# Patient Record
Sex: MALE | Race: WHITE | NOT HISPANIC OR LATINO | Employment: FULL TIME | URBAN - METROPOLITAN AREA
[De-identification: names, ages, dates, MRNs, and addresses within clinical notes are randomized per-mention and may not be internally consistent; named-entity substitution may affect disease eponyms.]

---

## 2017-04-27 ENCOUNTER — HISTORICAL (OUTPATIENT)
Dept: LAB | Facility: HOSPITAL | Age: 62
End: 2017-04-27

## 2017-04-27 LAB
ABS NEUT (OLG): 2.84 X10(3)/MCL (ref 2.1–9.2)
ALBUMIN SERPL-MCNC: 3.7 GM/DL (ref 3.4–5)
ALBUMIN/GLOB SERPL: 1.2 {RATIO}
ALP SERPL-CCNC: 59 UNIT/L (ref 50–136)
ALT SERPL-CCNC: 31 UNIT/L (ref 12–78)
AST SERPL-CCNC: 14 UNIT/L (ref 15–37)
BASOPHILS # BLD AUTO: 0.1 X10(3)/MCL (ref 0–0.2)
BASOPHILS NFR BLD AUTO: 1 %
BILIRUB SERPL-MCNC: 0.8 MG/DL (ref 0.2–1)
BILIRUBIN DIRECT+TOT PNL SERPL-MCNC: 0.2 MG/DL (ref 0–0.2)
BILIRUBIN DIRECT+TOT PNL SERPL-MCNC: 0.6 MG/DL (ref 0–0.8)
BUN SERPL-MCNC: 28 MG/DL (ref 7–18)
CALCIUM SERPL-MCNC: 9.4 MG/DL (ref 8.5–10.1)
CHLORIDE SERPL-SCNC: 107 MMOL/L (ref 98–107)
CHOLEST SERPL-MCNC: 158 MG/DL (ref 0–200)
CHOLEST/HDLC SERPL: 3.1 {RATIO} (ref 0–5)
CO2 SERPL-SCNC: 27 MMOL/L (ref 21–32)
CREAT SERPL-MCNC: 0.9 MG/DL (ref 0.7–1.3)
CREAT UR-MCNC: 137 MG/DL
EOSINOPHIL # BLD AUTO: 0.1 X10(3)/MCL (ref 0–0.9)
EOSINOPHIL NFR BLD AUTO: 2 %
ERYTHROCYTE [DISTWIDTH] IN BLOOD BY AUTOMATED COUNT: 13.2 % (ref 11.5–17)
EST. AVERAGE GLUCOSE BLD GHB EST-MCNC: 134 MG/DL
GLOBULIN SER-MCNC: 3 GM/DL (ref 2.4–3.5)
GLUCOSE SERPL-MCNC: 112 MG/DL (ref 74–106)
HBA1C MFR BLD: 6.3 % (ref 4.2–6.3)
HCT VFR BLD AUTO: 45.1 % (ref 42–52)
HDLC SERPL-MCNC: 51 MG/DL (ref 35–60)
HGB BLD-MCNC: 14.6 GM/DL (ref 14–18)
LDLC SERPL CALC-MCNC: 91 MG/DL (ref 0–129)
LYMPHOCYTES # BLD AUTO: 2.4 X10(3)/MCL (ref 0.6–4.6)
LYMPHOCYTES NFR BLD AUTO: 40 %
MCH RBC QN AUTO: 28.9 PG (ref 27–31)
MCHC RBC AUTO-ENTMCNC: 32.4 GM/DL (ref 33–36)
MCV RBC AUTO: 89.1 FL (ref 80–94)
MICROALBUMIN UR-MCNC: 0.5 MG/DL
MICROALBUMIN/CREAT RATIO PNL UR: 3.6 MG/GM CR (ref 0–30)
MONOCYTES # BLD AUTO: 0.6 X10(3)/MCL (ref 0.1–1.3)
MONOCYTES NFR BLD AUTO: 9 %
NEUTROPHILS # BLD AUTO: 2.84 X10(3)/MCL (ref 1.4–7.9)
NEUTROPHILS NFR BLD AUTO: 47 %
PLATELET # BLD AUTO: 280 X10(3)/MCL (ref 130–400)
PMV BLD AUTO: 9.4 FL (ref 9.4–12.4)
POTASSIUM SERPL-SCNC: 5 MMOL/L (ref 3.5–5.1)
PROT SERPL-MCNC: 6.7 GM/DL (ref 6.4–8.2)
RBC # BLD AUTO: 5.06 X10(6)/MCL (ref 4.7–6.1)
SODIUM SERPL-SCNC: 141 MMOL/L (ref 136–145)
TRIGL SERPL-MCNC: 79 MG/DL (ref 30–150)
VLDLC SERPL CALC-MCNC: 16 MG/DL
WBC # SPEC AUTO: 6.1 X10(3)/MCL (ref 4.5–11.5)

## 2017-05-02 ENCOUNTER — HISTORICAL (OUTPATIENT)
Dept: PREADMISSION TESTING | Facility: HOSPITAL | Age: 62
End: 2017-05-02

## 2017-05-12 ENCOUNTER — HISTORICAL (OUTPATIENT)
Dept: SURGERY | Facility: HOSPITAL | Age: 62
End: 2017-05-12

## 2017-10-26 ENCOUNTER — HISTORICAL (OUTPATIENT)
Dept: LAB | Facility: HOSPITAL | Age: 62
End: 2017-10-26

## 2017-10-26 LAB
ABS NEUT (OLG): 3.49 X10(3)/MCL (ref 2.1–9.2)
ALBUMIN SERPL-MCNC: 3.7 GM/DL (ref 3.4–5)
ALBUMIN/GLOB SERPL: 1.2 {RATIO}
ALP SERPL-CCNC: 75 UNIT/L (ref 50–136)
ALT SERPL-CCNC: 44 UNIT/L (ref 12–78)
AST SERPL-CCNC: 14 UNIT/L (ref 15–37)
BASOPHILS # BLD AUTO: 0.1 X10(3)/MCL (ref 0–0.2)
BASOPHILS NFR BLD AUTO: 1 %
BILIRUB SERPL-MCNC: 0.8 MG/DL (ref 0.2–1)
BILIRUBIN DIRECT+TOT PNL SERPL-MCNC: 0.2 MG/DL (ref 0–0.2)
BILIRUBIN DIRECT+TOT PNL SERPL-MCNC: 0.6 MG/DL (ref 0–0.8)
BUN SERPL-MCNC: 26 MG/DL (ref 7–18)
CALCIUM SERPL-MCNC: 9.5 MG/DL (ref 8.5–10.1)
CHLORIDE SERPL-SCNC: 105 MMOL/L (ref 98–107)
CHOLEST SERPL-MCNC: 181 MG/DL (ref 0–200)
CHOLEST/HDLC SERPL: 3.7 {RATIO} (ref 0–5)
CO2 SERPL-SCNC: 29 MMOL/L (ref 21–32)
CREAT SERPL-MCNC: 0.92 MG/DL (ref 0.7–1.3)
EOSINOPHIL # BLD AUTO: 0.3 X10(3)/MCL (ref 0–0.9)
EOSINOPHIL NFR BLD AUTO: 4 %
ERYTHROCYTE [DISTWIDTH] IN BLOOD BY AUTOMATED COUNT: 12.6 % (ref 11.5–17)
EST. AVERAGE GLUCOSE BLD GHB EST-MCNC: 157 MG/DL
GLOBULIN SER-MCNC: 3.2 GM/DL (ref 2.4–3.5)
GLUCOSE SERPL-MCNC: 128 MG/DL (ref 74–106)
HBA1C MFR BLD: 7.1 % (ref 4.2–6.3)
HCT VFR BLD AUTO: 43.9 % (ref 42–52)
HDLC SERPL-MCNC: 49 MG/DL (ref 35–60)
HGB BLD-MCNC: 14.4 GM/DL (ref 14–18)
LDLC SERPL CALC-MCNC: 116 MG/DL (ref 0–129)
LYMPHOCYTES # BLD AUTO: 2.9 X10(3)/MCL (ref 0.6–4.6)
LYMPHOCYTES NFR BLD AUTO: 38 %
MCH RBC QN AUTO: 29.1 PG (ref 27–31)
MCHC RBC AUTO-ENTMCNC: 32.8 GM/DL (ref 33–36)
MCV RBC AUTO: 88.9 FL (ref 80–94)
MONOCYTES # BLD AUTO: 0.8 X10(3)/MCL (ref 0.1–1.3)
MONOCYTES NFR BLD AUTO: 11 %
NEUTROPHILS # BLD AUTO: 3.49 X10(3)/MCL (ref 1.4–7.9)
NEUTROPHILS NFR BLD AUTO: 46 %
PLATELET # BLD AUTO: 315 X10(3)/MCL (ref 130–400)
PMV BLD AUTO: 9 FL (ref 9.4–12.4)
POTASSIUM SERPL-SCNC: 4.7 MMOL/L (ref 3.5–5.1)
PROT SERPL-MCNC: 6.9 GM/DL (ref 6.4–8.2)
RBC # BLD AUTO: 4.94 X10(6)/MCL (ref 4.7–6.1)
SODIUM SERPL-SCNC: 139 MMOL/L (ref 136–145)
TRIGL SERPL-MCNC: 80 MG/DL (ref 30–150)
VLDLC SERPL CALC-MCNC: 16 MG/DL
WBC # SPEC AUTO: 7.6 X10(3)/MCL (ref 4.5–11.5)

## 2018-09-08 ENCOUNTER — HISTORICAL (OUTPATIENT)
Dept: ADMINISTRATIVE | Facility: HOSPITAL | Age: 63
End: 2018-09-08

## 2018-12-17 ENCOUNTER — HISTORICAL (OUTPATIENT)
Dept: LAB | Facility: HOSPITAL | Age: 63
End: 2018-12-17

## 2018-12-17 LAB
ABS NEUT (OLG): 4.29 X10(3)/MCL (ref 2.1–9.2)
ALBUMIN SERPL-MCNC: 3.8 GM/DL (ref 3.4–5)
ALBUMIN/GLOB SERPL: 1.2 {RATIO}
ALP SERPL-CCNC: 68 UNIT/L (ref 50–136)
ALT SERPL-CCNC: 44 UNIT/L (ref 12–78)
APPEARANCE, UA: CLEAR
AST SERPL-CCNC: 17 UNIT/L (ref 15–37)
BACTERIA SPEC CULT: NORMAL /HPF
BASOPHILS # BLD AUTO: 0 X10(3)/MCL (ref 0–0.2)
BASOPHILS NFR BLD AUTO: 1 %
BILIRUB SERPL-MCNC: 0.9 MG/DL (ref 0.2–1)
BILIRUB UR QL STRIP: NEGATIVE
BILIRUBIN DIRECT+TOT PNL SERPL-MCNC: 0.2 MG/DL (ref 0–0.2)
BILIRUBIN DIRECT+TOT PNL SERPL-MCNC: 0.7 MG/DL (ref 0–0.8)
BUN SERPL-MCNC: 23 MG/DL (ref 7–18)
CALCIUM SERPL-MCNC: 9.3 MG/DL (ref 8.5–10.1)
CHLORIDE SERPL-SCNC: 107 MMOL/L (ref 98–107)
CHOLEST SERPL-MCNC: 186 MG/DL (ref 0–200)
CHOLEST/HDLC SERPL: 3.5 {RATIO} (ref 0–5)
CO2 SERPL-SCNC: 31 MMOL/L (ref 21–32)
COLOR UR: YELLOW
CREAT SERPL-MCNC: 1 MG/DL (ref 0.7–1.3)
CREAT UR-MCNC: 219 MG/DL
EOSINOPHIL # BLD AUTO: 0.3 X10(3)/MCL (ref 0–0.9)
EOSINOPHIL NFR BLD AUTO: 4 %
ERYTHROCYTE [DISTWIDTH] IN BLOOD BY AUTOMATED COUNT: 12.7 % (ref 11.5–17)
EST. AVERAGE GLUCOSE BLD GHB EST-MCNC: 171 MG/DL
GLOBULIN SER-MCNC: 3.2 GM/DL (ref 2.4–3.5)
GLUCOSE (UA): NEGATIVE
GLUCOSE SERPL-MCNC: 139 MG/DL (ref 74–106)
HBA1C MFR BLD: 7.6 % (ref 4.2–6.3)
HCT VFR BLD AUTO: 48.8 % (ref 42–52)
HDLC SERPL-MCNC: 53 MG/DL (ref 35–60)
HGB BLD-MCNC: 15.3 GM/DL (ref 14–18)
HGB UR QL STRIP: NEGATIVE
KETONES UR QL STRIP: NEGATIVE
LDLC SERPL CALC-MCNC: 108 MG/DL (ref 0–129)
LEUKOCYTE ESTERASE UR QL STRIP: NEGATIVE
LYMPHOCYTES # BLD AUTO: 2.7 X10(3)/MCL (ref 0.6–4.6)
LYMPHOCYTES NFR BLD AUTO: 34 %
MCH RBC QN AUTO: 29 PG (ref 27–31)
MCHC RBC AUTO-ENTMCNC: 31.4 GM/DL (ref 33–36)
MCV RBC AUTO: 92.4 FL (ref 80–94)
MICROALBUMIN UR-MCNC: 0.8 MG/DL
MICROALBUMIN/CREAT RATIO PNL UR: 3.7 MG/GM CR (ref 0–30)
MONOCYTES # BLD AUTO: 0.8 X10(3)/MCL (ref 0.1–1.3)
MONOCYTES NFR BLD AUTO: 9 %
NEUTROPHILS # BLD AUTO: 4.29 X10(3)/MCL (ref 2.1–9.2)
NEUTROPHILS NFR BLD AUTO: 53 %
NITRITE UR QL STRIP: NEGATIVE
PH UR STRIP: 5 [PH] (ref 5–9)
PLATELET # BLD AUTO: 287 X10(3)/MCL (ref 130–400)
PMV BLD AUTO: 9.5 FL (ref 9.4–12.4)
POTASSIUM SERPL-SCNC: 4.7 MMOL/L (ref 3.5–5.1)
PROT SERPL-MCNC: 7 GM/DL (ref 6.4–8.2)
PROT UR QL STRIP: NEGATIVE
PSA SERPL-MCNC: 5.67 NG/ML (ref 0–4)
RBC # BLD AUTO: 5.28 X10(6)/MCL (ref 4.7–6.1)
RBC #/AREA URNS HPF: NORMAL /[HPF]
SODIUM SERPL-SCNC: 146 MMOL/L (ref 136–145)
SP GR UR STRIP: 1.02 (ref 1–1.03)
SQUAMOUS EPITHELIAL, UA: NORMAL
TRIGL SERPL-MCNC: 125 MG/DL (ref 30–150)
TSH SERPL-ACNC: 0.95 MIU/L (ref 0.36–3.74)
UROBILINOGEN UR STRIP-ACNC: 0.2
VLDLC SERPL CALC-MCNC: 25 MG/DL
WBC # SPEC AUTO: 8.2 X10(3)/MCL (ref 4.5–11.5)
WBC #/AREA URNS HPF: NORMAL /HPF

## 2019-01-06 ENCOUNTER — HISTORICAL (OUTPATIENT)
Dept: ADMINISTRATIVE | Facility: HOSPITAL | Age: 64
End: 2019-01-06

## 2019-04-16 ENCOUNTER — HISTORICAL (OUTPATIENT)
Dept: LAB | Facility: HOSPITAL | Age: 64
End: 2019-04-16

## 2019-04-16 LAB
ABS NEUT (OLG): 4.25 X10(3)/MCL (ref 2.1–9.2)
ALBUMIN SERPL-MCNC: 3.8 GM/DL (ref 3.4–5)
ALBUMIN/GLOB SERPL: 1.2 {RATIO}
ALP SERPL-CCNC: 70 UNIT/L (ref 50–136)
ALT SERPL-CCNC: 32 UNIT/L (ref 12–78)
APPEARANCE, UA: CLEAR
AST SERPL-CCNC: 18 UNIT/L (ref 15–37)
BACTERIA SPEC CULT: ABNORMAL /HPF
BASOPHILS # BLD AUTO: 0 X10(3)/MCL (ref 0–0.2)
BASOPHILS NFR BLD AUTO: 0 %
BILIRUB SERPL-MCNC: 0.8 MG/DL (ref 0.2–1)
BILIRUB UR QL STRIP: ABNORMAL
BILIRUBIN DIRECT+TOT PNL SERPL-MCNC: 0.2 MG/DL (ref 0–0.2)
BILIRUBIN DIRECT+TOT PNL SERPL-MCNC: 0.6 MG/DL (ref 0–0.8)
BUN SERPL-MCNC: 24 MG/DL (ref 7–18)
CALCIUM SERPL-MCNC: 9.5 MG/DL (ref 8.5–10.1)
CHLORIDE SERPL-SCNC: 105 MMOL/L (ref 98–107)
CHOLEST SERPL-MCNC: 186 MG/DL (ref 0–200)
CHOLEST/HDLC SERPL: 3.6 {RATIO} (ref 0–5)
CO2 SERPL-SCNC: 29 MMOL/L (ref 21–32)
COLOR UR: ABNORMAL
CREAT SERPL-MCNC: 1.03 MG/DL (ref 0.7–1.3)
CREAT UR-MCNC: 388 MG/DL
EOSINOPHIL # BLD AUTO: 0.2 X10(3)/MCL (ref 0–0.9)
EOSINOPHIL NFR BLD AUTO: 2 %
ERYTHROCYTE [DISTWIDTH] IN BLOOD BY AUTOMATED COUNT: 12.9 % (ref 11.5–17)
EST. AVERAGE GLUCOSE BLD GHB EST-MCNC: 154 MG/DL
GLOBULIN SER-MCNC: 3.2 GM/DL (ref 2.4–3.5)
GLUCOSE (UA): NEGATIVE
GLUCOSE SERPL-MCNC: 129 MG/DL (ref 74–106)
HBA1C MFR BLD: 7 % (ref 4.2–6.3)
HCT VFR BLD AUTO: 45.5 % (ref 42–52)
HDLC SERPL-MCNC: 51 MG/DL (ref 35–60)
HGB BLD-MCNC: 14.6 GM/DL (ref 14–18)
HGB UR QL STRIP: NEGATIVE
KETONES UR QL STRIP: ABNORMAL
LDLC SERPL CALC-MCNC: 109 MG/DL (ref 0–129)
LEUKOCYTE ESTERASE UR QL STRIP: NEGATIVE
LYMPHOCYTES # BLD AUTO: 2.2 X10(3)/MCL (ref 0.6–4.6)
LYMPHOCYTES NFR BLD AUTO: 31 %
MCH RBC QN AUTO: 28.9 PG (ref 27–31)
MCHC RBC AUTO-ENTMCNC: 32.1 GM/DL (ref 33–36)
MCV RBC AUTO: 89.9 FL (ref 80–94)
MICROALBUMIN UR-MCNC: 1.8 MG/DL
MICROALBUMIN/CREAT RATIO PNL UR: 4.6 MG/GM CR (ref 0–30)
MONOCYTES # BLD AUTO: 0.6 X10(3)/MCL (ref 0.1–1.3)
MONOCYTES NFR BLD AUTO: 8 %
NEUTROPHILS # BLD AUTO: 4.25 X10(3)/MCL (ref 2.1–9.2)
NEUTROPHILS NFR BLD AUTO: 59 %
NITRITE UR QL STRIP: NEGATIVE
PH UR STRIP: 5 [PH] (ref 5–9)
PLATELET # BLD AUTO: 291 X10(3)/MCL (ref 130–400)
PMV BLD AUTO: 9.6 FL (ref 9.4–12.4)
POTASSIUM SERPL-SCNC: 4.5 MMOL/L (ref 3.5–5.1)
PROT SERPL-MCNC: 7 GM/DL (ref 6.4–8.2)
PROT UR QL STRIP: NEGATIVE
PSA SERPL-MCNC: 5.34 NG/ML (ref 0–4)
RBC # BLD AUTO: 5.06 X10(6)/MCL (ref 4.7–6.1)
RBC #/AREA URNS HPF: ABNORMAL /[HPF]
SODIUM SERPL-SCNC: 140 MMOL/L (ref 136–145)
SP GR UR STRIP: 1.02 (ref 1–1.03)
SQUAMOUS EPITHELIAL, UA: ABNORMAL
TRIGL SERPL-MCNC: 129 MG/DL (ref 30–150)
TSH SERPL-ACNC: 0.94 MIU/L (ref 0.36–3.74)
UROBILINOGEN UR STRIP-ACNC: 1
VLDLC SERPL CALC-MCNC: 26 MG/DL
WBC # SPEC AUTO: 7.2 X10(3)/MCL (ref 4.5–11.5)
WBC #/AREA URNS HPF: ABNORMAL /HPF

## 2019-09-03 ENCOUNTER — HISTORICAL (OUTPATIENT)
Dept: LAB | Facility: HOSPITAL | Age: 64
End: 2019-09-03

## 2019-09-03 LAB
ALBUMIN SERPL-MCNC: 3.8 GM/DL (ref 3.4–5)
ALBUMIN/GLOB SERPL: 1.3 {RATIO}
ALP SERPL-CCNC: 60 UNIT/L (ref 45–117)
ALT SERPL-CCNC: 26 UNIT/L (ref 16–61)
AST SERPL-CCNC: 16 UNIT/L (ref 15–37)
BILIRUB SERPL-MCNC: 0.9 MG/DL (ref 0.2–1)
BILIRUBIN DIRECT+TOT PNL SERPL-MCNC: 0.18 MG/DL (ref 0–0.2)
BILIRUBIN DIRECT+TOT PNL SERPL-MCNC: 0.72 MG/DL (ref 0–1)
BUN SERPL-MCNC: 27 MG/DL (ref 7–18)
CALCIUM SERPL-MCNC: 9.5 MG/DL (ref 8.5–10.1)
CHLORIDE SERPL-SCNC: 109 MMOL/L (ref 98–107)
CO2 SERPL-SCNC: 30 MMOL/L (ref 21–32)
CREAT SERPL-MCNC: 1.02 MG/DL (ref 0.7–1.3)
EST. AVERAGE GLUCOSE BLD GHB EST-MCNC: 154 MG/DL
GLOBULIN SER-MCNC: 3 GM/DL (ref 2–4)
GLUCOSE SERPL-MCNC: 141 MG/DL (ref 74–106)
HBA1C MFR BLD: 7 % (ref 4.2–6.3)
POTASSIUM SERPL-SCNC: 4.7 MMOL/L (ref 3.5–5.1)
PROT SERPL-MCNC: 6.9 GM/DL (ref 6.4–8.2)
PSA SERPL-MCNC: 6.22 NG/ML (ref 0–4)
SODIUM SERPL-SCNC: 143 MMOL/L (ref 136–145)

## 2019-12-03 ENCOUNTER — HISTORICAL (OUTPATIENT)
Dept: LAB | Facility: HOSPITAL | Age: 64
End: 2019-12-03

## 2019-12-03 ENCOUNTER — HISTORICAL (OUTPATIENT)
Dept: RADIOLOGY | Facility: HOSPITAL | Age: 64
End: 2019-12-03

## 2019-12-03 LAB
ALBUMIN SERPL-MCNC: 3.7 GM/DL (ref 3.4–5)
ALBUMIN/GLOB SERPL: 1.2 {RATIO}
ALP SERPL-CCNC: 70 UNIT/L (ref 50–136)
ALT SERPL-CCNC: 37 UNIT/L (ref 12–78)
AST SERPL-CCNC: 16 UNIT/L (ref 15–37)
BILIRUB SERPL-MCNC: 0.8 MG/DL (ref 0.2–1)
BILIRUBIN DIRECT+TOT PNL SERPL-MCNC: 0.1 MG/DL (ref 0–0.2)
BILIRUBIN DIRECT+TOT PNL SERPL-MCNC: 0.7 MG/DL (ref 0–0.8)
BUN SERPL-MCNC: 21 MG/DL (ref 7–18)
CALCIUM SERPL-MCNC: 9.3 MG/DL (ref 8.5–10.1)
CHLORIDE SERPL-SCNC: 106 MMOL/L (ref 98–107)
CO2 SERPL-SCNC: 25 MMOL/L (ref 21–32)
CREAT SERPL-MCNC: 0.94 MG/DL (ref 0.7–1.3)
EST. AVERAGE GLUCOSE BLD GHB EST-MCNC: 157 MG/DL
GLOBULIN SER-MCNC: 3.2 GM/DL (ref 2.4–3.5)
GLUCOSE SERPL-MCNC: 154 MG/DL (ref 74–106)
HBA1C MFR BLD: 7.1 % (ref 4.2–6.3)
POTASSIUM SERPL-SCNC: 4.4 MMOL/L (ref 3.5–5.1)
PROT SERPL-MCNC: 6.9 GM/DL (ref 6.4–8.2)
SODIUM SERPL-SCNC: 139 MMOL/L (ref 136–145)

## 2020-02-11 ENCOUNTER — OFFICE VISIT (OUTPATIENT)
Dept: UROLOGY | Facility: CLINIC | Age: 65
End: 2020-02-11
Payer: COMMERCIAL

## 2020-02-11 ENCOUNTER — TELEPHONE (OUTPATIENT)
Dept: UROLOGY | Facility: CLINIC | Age: 65
End: 2020-02-11

## 2020-02-11 VITALS
WEIGHT: 220.44 LBS | DIASTOLIC BLOOD PRESSURE: 85 MMHG | HEIGHT: 70 IN | BODY MASS INDEX: 31.56 KG/M2 | SYSTOLIC BLOOD PRESSURE: 136 MMHG | RESPIRATION RATE: 15 BRPM | HEART RATE: 64 BPM

## 2020-02-11 DIAGNOSIS — C61 PROSTATE CANCER: Primary | ICD-10-CM

## 2020-02-11 PROCEDURE — 99999 PR PBB SHADOW E&M-NEW PATIENT-LVL III: ICD-10-PCS | Mod: PBBFAC,,, | Performed by: UROLOGY

## 2020-02-11 PROCEDURE — 3008F PR BODY MASS INDEX (BMI) DOCUMENTED: ICD-10-PCS | Mod: CPTII,S$GLB,, | Performed by: UROLOGY

## 2020-02-11 PROCEDURE — 99204 OFFICE O/P NEW MOD 45 MIN: CPT | Mod: S$GLB,,, | Performed by: UROLOGY

## 2020-02-11 PROCEDURE — 99999 PR PBB SHADOW E&M-NEW PATIENT-LVL III: CPT | Mod: PBBFAC,,, | Performed by: UROLOGY

## 2020-02-11 PROCEDURE — 3008F BODY MASS INDEX DOCD: CPT | Mod: CPTII,S$GLB,, | Performed by: UROLOGY

## 2020-02-11 PROCEDURE — 99204 PR OFFICE/OUTPT VISIT, NEW, LEVL IV, 45-59 MIN: ICD-10-PCS | Mod: S$GLB,,, | Performed by: UROLOGY

## 2020-02-11 RX ORDER — ATORVASTATIN CALCIUM 20 MG/1
TABLET, FILM COATED ORAL
COMMUNITY
Start: 2018-07-10 | End: 2023-04-14 | Stop reason: SDUPTHER

## 2020-02-11 RX ORDER — ASPIRIN 81 MG/1
81 TABLET ORAL DAILY
COMMUNITY

## 2020-02-11 RX ORDER — CARVEDILOL 6.25 MG/1
TABLET ORAL
COMMUNITY
Start: 2018-07-23 | End: 2023-04-24 | Stop reason: SDUPTHER

## 2020-02-11 RX ORDER — LISINOPRIL 20 MG/1
TABLET ORAL
COMMUNITY
Start: 2018-07-10 | End: 2023-04-24 | Stop reason: SDUPTHER

## 2020-02-11 RX ORDER — MELOXICAM 7.5 MG/1
7.5 TABLET ORAL DAILY
COMMUNITY
Start: 2020-01-13 | End: 2020-07-16

## 2020-02-11 RX ORDER — METFORMIN HYDROCHLORIDE 500 MG/1
500 TABLET ORAL 2 TIMES DAILY
COMMUNITY
Start: 2018-07-23 | End: 2023-04-24 | Stop reason: SDUPTHER

## 2020-02-11 NOTE — LETTER
February 11, 2020        Solo Hernandez III, MD  [address]  [facility] [address]             [facility]  [address] [address]  Phone: [phone]   Patient: Zohaib Moon   MR Number: [id number]   YOB: 1955   Date of Visit: 2/11/2020       Dear Dr. Hernandez:    Thank you for referring Zohaib Moon to me for evaluation. Attached you will find relevant portions of my assessment and plan of care.    If you have questions, please do not hesitate to call me. I look forward to following Zohaib Moon along with you.    Sincerely,      Ti Maier MD            CC  No Recipients    Enclosure

## 2020-02-11 NOTE — PROGRESS NOTES
Clinic Note  2/11/2020      Subjective:         Chief Complaint:   HPI  Zohaib Moon is a 65 y.o. male recently prostate cancer on January 28th. Was told he had intermediate risk tumor.  Consult from Dr. Solo Hernandez.  Type 2 diabetes. Negative family history. Semi-retired from restaurant business, . 4 grandkids.  Continent and potent    T1C  PSA 6.2  MRI 12/9/2019- 60 ccs, PI-RADS 4 1.1 cm anterior lesion. Negative extra prostatic extension, seminal vesicle.  Biopsy-1/21/2019- Jose 6, left anterior target 4/7 28%, right apex 1/1 6%, left apex 1/1 6%, left middle 2/2 50%, left lateral apex 1/1 7%, left lateral mid 1/1 67%, left lateral base 1/1 8%. Overall 11/20 positive cores.  MARILYN score- 3 low risk  NCCN-low risk      No results found for: PSA, PSADIAG, PSATOTAL, PSAFREE, PSAFREEPCT   No past medical history on file.  No family history on file.  Social History     Socioeconomic History    Marital status:      Spouse name: Not on file    Number of children: Not on file    Years of education: Not on file    Highest education level: Not on file   Occupational History    Not on file   Social Needs    Financial resource strain: Not on file    Food insecurity:     Worry: Not on file     Inability: Not on file    Transportation needs:     Medical: Not on file     Non-medical: Not on file   Tobacco Use    Smoking status: Not on file   Substance and Sexual Activity    Alcohol use: Not on file    Drug use: Not on file    Sexual activity: Not on file   Lifestyle    Physical activity:     Days per week: Not on file     Minutes per session: Not on file    Stress: Not on file   Relationships    Social connections:     Talks on phone: Not on file     Gets together: Not on file     Attends Jainism service: Not on file     Active member of club or organization: Not on file     Attends meetings of clubs or organizations: Not on file     Relationship status: Not on file   Other Topics  Concern    Not on file   Social History Narrative    Not on file     No past surgical history on file.  There is no problem list on file for this patient.    Review of Systems   Constitutional: Negative for appetite change, chills, fatigue, fever and unexpected weight change.   HENT: Negative for nosebleeds.    Respiratory: Negative for shortness of breath and wheezing.    Cardiovascular: Negative for chest pain, palpitations and leg swelling.   Gastrointestinal: Negative for abdominal distention, abdominal pain, constipation, diarrhea, nausea and vomiting.   Genitourinary: Positive for nocturia. Negative for dysuria and hematuria.        2x/noc   Musculoskeletal: Negative for arthralgias and back pain.   Skin: Negative for pallor.   Neurological: Negative for dizziness, seizures and syncope.   Hematological: Negative for adenopathy.   Psychiatric/Behavioral: Negative for dysphoric mood.         Objective:      There were no vitals taken for this visit.  There is no height or weight on file to calculate BMI.  Physical Exam   Constitutional: He is oriented to person, place, and time. He appears well-developed and well-nourished. No distress.   HENT:   Head: Atraumatic.   Neck: No tracheal deviation present.   Cardiovascular: Normal rate.    Pulmonary/Chest: Effort normal. No respiratory distress. He has no wheezes.   Abdominal: Soft. Bowel sounds are normal. He exhibits no distension and no mass. There is no tenderness. There is no rebound and no guarding.   Neurological: He is alert and oriented to person, place, and time.   Skin: Skin is warm and dry. He is not diaphoretic.     Psychiatric: He has a normal mood and affect. His behavior is normal. Judgment and thought content normal.         Assessment and Plan:           Problem List Items Addressed This Visit     None          Follow up:   Today's visit was spent almost entirely on counseling. We reviewed his diagnosis, stage, grade, risk group, and prognosis.  We discussed [place] (NCCN) and MARILYN risk stratification  We discussed the concept of low risk, intermediate risk, and high risk disease. We also reviewed the NCCN treatment nomogram.We discussed the different treatment options including active surveillance (as well as the surveillance protocol), prostate brachytherapy, IMRT, IGRT, cryotherapy, HIFU and both open and robotic prostatectomy.We also discussed the advantages, disadvantages, risks and benefits, as well as complications of each option. Regarding radiation therapy we discussed treatment planning, the different techniques, short and long term complications. These included radiation cystitis, radiation proctitis, and impotence. We discussed success, failure, and salvage therapeutic options.Also discussed the use of SpaceOAR for brachytherapy and EBRT and fiducials for EBRT.   We discussed surgical therapy in depth including preoperative preparation, surgical technique (including bladder neck and nerve-sparing techniques), postoperative recuperation and recovery, and short and long term complications including UTI, bleeding, blood clots,catheter dislodgement, etc. We discussed the risks of reoperation, incontinence, impotence, and recurrence. We discussed preop and postop Kegels, post op penile rehab, and treatment options for incontinence and impotence. We discussed rates of cancer free survival and recurrence, as well as salvage therapeutic options. We discussed the possible  indications for adjuvant radiation therapy.   I answered questions and addressed concerns. Also discussed the Prolaris test to get genetic information about this tumors potential future behavior.   My nurse will instruct the patient on Kegel exercises today and give them the Kegel exercise instruction sheet.   The patient will meet with our  Enma today to find a date for surgery and begin preparation for surgery. Will also receive our handout on NPO guidelines and preop  hydration. Patient will also receive information and education on preoperative skin preparation and postop wound care.   I spent over 45 minutes with the patient. Over 50% of the visit was spent in counseling.   .Letter to Dr. Solo Hernandez.    Ti Maier

## 2020-02-17 ENCOUNTER — PATIENT MESSAGE (OUTPATIENT)
Dept: SURGERY | Facility: HOSPITAL | Age: 65
End: 2020-02-17

## 2020-02-23 ENCOUNTER — PATIENT MESSAGE (OUTPATIENT)
Dept: SURGERY | Facility: HOSPITAL | Age: 65
End: 2020-02-23

## 2020-03-06 NOTE — ANESTHESIA PAT ROS NOTE
03/06/2020  Zohaib Moon is a 65 y.o., male.      Pre-op Assessment         Review of Systems         Anesthesia Assessment: Preoperative EQUATION    Planned Procedure: Procedure(s) (LRB):  ROBOTIC PROSTATECTOMY (N/A)  Requested Anesthesia Type:General  Surgeon: Ti Maier MD  Service: Urology  Known or anticipated Date of Surgery:3/23/2020    Surgeon notes: reviewed    Previous anesthesia records:Not available    Last PCP note: 3-6 months ago , outside [facility] Dr Yaritza Levy 12/3/19  Subspecialty notes: Cardiology: General, Urology    Other important co-morbidities: per Epic DM2, HLD, HTN, Obesity and prostate ca, DDD, hearing loss      Tests already available:  No recent tests.             Instructions given. (See in Nurse's note)    Optimization:  Anesthesia Preop Clinic Assessment  Indicated.    Medical Opinion Indicated.           Plan:    Testing:  A1C, CMP, EKG, Hematology Profile and T&S   Pre-anesthesia  visit       Visit focus: concerns in complex and/or prolonged anesthesia, position other than supine     Consultation:IM Perioperative Hospitalist     Patient  has previously scheduled Medical Appointment: 3/18/2020 Urology pre-op    Navigation: Tests Scheduled.              Consults scheduled.             Results will be tracked by Preop Clinic.      3/10/2020  Received OS PCP records from Dr. Levy. (scanned to media)     3/16/2020  Received OS card records (scanned to media)

## 2020-03-09 ENCOUNTER — TELEPHONE (OUTPATIENT)
Dept: PREADMISSION TESTING | Facility: HOSPITAL | Age: 65
End: 2020-03-09

## 2020-03-09 NOTE — TELEPHONE ENCOUNTER
----- Message from Eufemia Alonso RN sent at 3/6/2020 12:34 PM CST -----  Surgery date: 3/23/2020  PreOp appt date: 3/18/2020 (please note patient coming in from [place])    Please call patient to schedule the following appointments:    Labs  EKG  POC  Ming/NP      Thanks!    Eufemia Alonso

## 2020-03-09 NOTE — TELEPHONE ENCOUNTER
Hello This patient is coming in from out of town. Needs an OPOC there is none before the patient Surgery.

## 2020-03-10 ENCOUNTER — PATIENT MESSAGE (OUTPATIENT)
Dept: SURGERY | Facility: HOSPITAL | Age: 65
End: 2020-03-10

## 2020-03-16 ENCOUNTER — PATIENT MESSAGE (OUTPATIENT)
Dept: SURGERY | Facility: HOSPITAL | Age: 65
End: 2020-03-16

## 2020-03-17 NOTE — PRE-PROCEDURE INSTRUCTIONS
Preop medication instructions given and reviewed; instructed to hold vitamins, herbal supplements and NSAIDS one week prior to surgery;  Patient verbalized understanding.

## 2020-03-18 ENCOUNTER — INITIAL CONSULT (OUTPATIENT)
Dept: INTERNAL MEDICINE | Facility: CLINIC | Age: 65
End: 2020-03-18
Payer: COMMERCIAL

## 2020-03-18 ENCOUNTER — OFFICE VISIT (OUTPATIENT)
Dept: UROLOGY | Facility: CLINIC | Age: 65
End: 2020-03-18
Payer: COMMERCIAL

## 2020-03-18 ENCOUNTER — ANESTHESIA EVENT (OUTPATIENT)
Dept: SURGERY | Facility: HOSPITAL | Age: 65
End: 2020-03-18
Payer: COMMERCIAL

## 2020-03-18 ENCOUNTER — HOSPITAL ENCOUNTER (OUTPATIENT)
Dept: CARDIOLOGY | Facility: CLINIC | Age: 65
Discharge: HOME | End: 2020-03-18
Attending: ANESTHESIOLOGY
Payer: COMMERCIAL

## 2020-03-18 VITALS
OXYGEN SATURATION: 100 % | HEART RATE: 58 BPM | HEIGHT: 70 IN | WEIGHT: 218.5 LBS | BODY MASS INDEX: 31.28 KG/M2 | TEMPERATURE: 99 F | SYSTOLIC BLOOD PRESSURE: 148 MMHG | DIASTOLIC BLOOD PRESSURE: 74 MMHG

## 2020-03-18 DIAGNOSIS — Z01.818 PREOP EXAMINATION: Primary | ICD-10-CM

## 2020-03-18 DIAGNOSIS — E78.00 HYPERCHOLESTEROLEMIA: ICD-10-CM

## 2020-03-18 DIAGNOSIS — R06.83 SNORING: ICD-10-CM

## 2020-03-18 DIAGNOSIS — E66.9 CLASS 1 OBESITY WITH BODY MASS INDEX (BMI) OF 31.0 TO 31.9 IN ADULT, UNSPECIFIED OBESITY TYPE, UNSPECIFIED WHETHER SERIOUS COMORBIDITY PRESENT: ICD-10-CM

## 2020-03-18 DIAGNOSIS — Z98.890 HISTORY OF CORONARY ANGIOGRAM: ICD-10-CM

## 2020-03-18 DIAGNOSIS — C61 PROSTATE CANCER: ICD-10-CM

## 2020-03-18 DIAGNOSIS — H91.91 HEARING LOSS OF RIGHT EAR, UNSPECIFIED HEARING LOSS TYPE: ICD-10-CM

## 2020-03-18 DIAGNOSIS — E11.9 TYPE 2 DIABETES MELLITUS WITHOUT COMPLICATION, WITHOUT LONG-TERM CURRENT USE OF INSULIN: ICD-10-CM

## 2020-03-18 DIAGNOSIS — R17 TOTAL BILIRUBIN, ELEVATED: ICD-10-CM

## 2020-03-18 DIAGNOSIS — R00.1 SINUS BRADYCARDIA: ICD-10-CM

## 2020-03-18 DIAGNOSIS — C61 PROSTATE CANCER: Primary | ICD-10-CM

## 2020-03-18 DIAGNOSIS — Z01.818 PREOP TESTING: ICD-10-CM

## 2020-03-18 DIAGNOSIS — I10 ESSENTIAL HYPERTENSION: ICD-10-CM

## 2020-03-18 DIAGNOSIS — Z82.49 FAMILY HISTORY OF HEART DISEASE: ICD-10-CM

## 2020-03-18 PROBLEM — H91.90 HEARING LOSS: Status: ACTIVE | Noted: 2020-03-18

## 2020-03-18 PROBLEM — E66.811 CLASS 1 OBESITY WITH BODY MASS INDEX (BMI) OF 31.0 TO 31.9 IN ADULT: Status: ACTIVE | Noted: 2020-03-18

## 2020-03-18 PROCEDURE — 99999 PR PBB SHADOW E&M-EST. PATIENT-LVL I: CPT | Mod: PBBFAC,,,

## 2020-03-18 PROCEDURE — 93010 EKG 12-LEAD: ICD-10-PCS | Mod: S$GLB,,, | Performed by: INTERNAL MEDICINE

## 2020-03-18 PROCEDURE — 99244 OFF/OP CNSLTJ NEW/EST MOD 40: CPT | Mod: S$GLB,,, | Performed by: HOSPITALIST

## 2020-03-18 PROCEDURE — 93010 ELECTROCARDIOGRAM REPORT: CPT | Mod: S$GLB,,, | Performed by: INTERNAL MEDICINE

## 2020-03-18 PROCEDURE — 87086 URINE CULTURE/COLONY COUNT: CPT

## 2020-03-18 PROCEDURE — 99244 PR OFFICE CONSULTATION,LEVEL IV: ICD-10-PCS | Mod: S$GLB,,, | Performed by: HOSPITALIST

## 2020-03-18 PROCEDURE — 99999 PR PBB SHADOW E&M-EST. PATIENT-LVL III: CPT | Mod: PBBFAC,,, | Performed by: HOSPITALIST

## 2020-03-18 PROCEDURE — 99999 PR PBB SHADOW E&M-EST. PATIENT-LVL I: ICD-10-PCS | Mod: PBBFAC,,,

## 2020-03-18 PROCEDURE — 99499 NO LOS: ICD-10-PCS | Mod: S$GLB,,, | Performed by: UROLOGY

## 2020-03-18 PROCEDURE — 93005 ELECTROCARDIOGRAM TRACING: CPT | Mod: S$GLB,,, | Performed by: ANESTHESIOLOGY

## 2020-03-18 PROCEDURE — 99499 UNLISTED E&M SERVICE: CPT | Mod: S$GLB,,, | Performed by: UROLOGY

## 2020-03-18 PROCEDURE — 99999 PR PBB SHADOW E&M-EST. PATIENT-LVL III: ICD-10-PCS | Mod: PBBFAC,,, | Performed by: HOSPITALIST

## 2020-03-18 PROCEDURE — 93005 EKG 12-LEAD: ICD-10-PCS | Mod: S$GLB,,, | Performed by: ANESTHESIOLOGY

## 2020-03-18 RX ORDER — MULTIVITAMIN
1 TABLET ORAL NIGHTLY
COMMUNITY

## 2020-03-18 RX ORDER — PREGABALIN 25 MG/1
150 CAPSULE ORAL
Status: CANCELLED | OUTPATIENT
Start: 2020-03-18 | End: 2020-03-19

## 2020-03-18 RX ORDER — HEPARIN SODIUM 5000 [USP'U]/ML
5000 INJECTION, SOLUTION INTRAVENOUS; SUBCUTANEOUS
Status: CANCELLED | OUTPATIENT
Start: 2020-03-18 | End: 2020-03-19

## 2020-03-18 RX ORDER — ACETAMINOPHEN 500 MG
1000 TABLET ORAL
Status: CANCELLED | OUTPATIENT
Start: 2020-03-18

## 2020-03-18 RX ORDER — SODIUM CHLORIDE 9 MG/ML
INJECTION, SOLUTION INTRAVENOUS CONTINUOUS
Status: CANCELLED | OUTPATIENT
Start: 2020-03-18

## 2020-03-18 RX ORDER — KETOROLAC TROMETHAMINE 30 MG/ML
15 INJECTION, SOLUTION INTRAMUSCULAR; INTRAVENOUS
Status: CANCELLED | OUTPATIENT
Start: 2020-03-18 | End: 2020-03-19

## 2020-03-18 RX ORDER — LIDOCAINE HYDROCHLORIDE 10 MG/ML
1 INJECTION, SOLUTION EPIDURAL; INFILTRATION; INTRACAUDAL; PERINEURAL ONCE
Status: CANCELLED | OUTPATIENT
Start: 2020-03-18 | End: 2020-03-18

## 2020-03-18 NOTE — ASSESSMENT & PLAN NOTE
Carvedilol   Lisinopril    Recent BP readings in the record-140/80  Hypertension-  Blood pressure is acceptable . I suggest continuation of  Carvedilol  - during the entire perioperative period. I suggest holding  Lisinopril- on the morning of the surgery and can continue that  post operatively under blood pressure, electrolyte and renal function monitoring as long as they are acceptable.I suggest addressing pain control as uncontrolled pain can increased blood pressure     Suggested tighter BP control , given Diabetes   Suggested Home BP monitoring     Suggested checking the accuracy of  home BP machine with that of the PCP    Suggested salt reduction    BP- Goals discusesd     Goal BP     Automated-120 to 125/<80  Manual -125 to 130/<80

## 2020-03-18 NOTE — ASSESSMENT & PLAN NOTE
Metformin     Type 2 Diabetes Mellitus  On treatment with oral agents  Not on  Insulin    Hemoglobin A1c- 6.7 0 per him- Dec 2019   Capillary glucose check-none  Suggested checking Glucose     No known DM complications   No lower extremity claudication

## 2020-03-18 NOTE — ANESTHESIA PREPROCEDURE EVALUATION
03/18/2020  Zohaib Moon is a 65 y.o., male.    Anesthesia Evaluation         Review of Systems  Anesthesia Hx:  No problems with previous Anesthesia   Social:  Non-Smoker    Cardiovascular:   Exercise tolerance: good Hypertension Denies CAD.     Denies Angina.  Functional Capacity Can you climb two flights of stairs? ==> Yes    Pulmonary:   Denies Asthma.  Denies Recent URI.  Denies Sleep Apnea.    Renal/:  Renal/ Normal     Hepatic/GI:   Denies PUD. Denies Hiatal Hernia.  Denies GERD. Denies Liver Disease.  Denies Hepatitis.    Neurological:   Denies CVA. Denies Seizures.    Endocrine:   Denies Diabetes. Denies Hypothyroidism.        Physical Exam  General:  Well nourished    Airway/Jaw/Neck:  Airway Findings: Mouth Opening: Normal Tongue: Normal  General Airway Assessment: Adult  Mallampati: I  TM Distance: Normal, at least 6 cm  Jaw/Neck Findings:  Neck ROM: Normal ROM  Neck Findings:     Eyes/Ears/Nose:  EYES/EARS/NOSE FINDINGS: Normal   Dental:  Dental Findings: In tact        Mental Status:  Mental Status Findings:  Alert and Oriented         Anesthesia Plan  Type of Anesthesia, risks & benefits discussed:  Anesthesia Type:  general  Patient's Preference: Proceed with anesthesia understanding that the risks are very small but could be serious or life threatening.  Intra-op Monitoring Plan: standard ASA monitors  Intra-op Monitoring Plan Comments:   Post Op Pain Control Plan:   Post Op Pain Control Plan Comments:   Induction:   IV  Beta Blocker:  Patient is not currently on a Beta-Blocker (No further documentation required).       Informed Consent: Patient understands risks and agrees with Anesthesia plan.  Questions answered. Anesthesia consent signed with patient.  ASA Score: 2     Day of Surgery Review of History & Physical: I have interviewed and examined the patient. I have reviewed the  patient's H&P dated:            Ready For Surgery From Anesthesia Perspective.

## 2020-03-18 NOTE — ASSESSMENT & PLAN NOTE
Elevated total bilirubin   No suggestion of  hepatic decompensation - suggested follow up   No easy bruising , bleeding

## 2020-03-18 NOTE — LETTER
March 18, 2020      Ti Maier MD  [address]  [place] [address]           [facility]  [address] [facility] [facility] [address]  Phone: [phone]          Patient: Zohaib Moon   MR Number: [id number]   YOB: 1955   Date of Visit: 3/18/2020       Dear Dr. Magnus Anna:    Thank you for referring Zohaib Moon to me for evaluation. Attached you will find relevant portions of my assessment and plan of care.    If you have questions, please do not hesitate to call me. I look forward to following Zohaib Moon along with you.    Sincerely,    Lucila Becerra MD    Enclosure  CC:  MD Yaritza Sanon MD    If you would like to receive this communication electronically, please contact [email or web address] or [phone] to request more information on [employer] access.    For providers and/or their staff who would like to refer a patient to [facility], please contact us through our one-stop-shop provider referral line, [facility], at [phone].    If you feel you have received this communication in error or would no longer like to receive these types of communications, please e-mail [email or web address]

## 2020-03-18 NOTE — PROGRESS NOTES
[facility] - Pre Op Consult  Progress Note    Patient Name: Zohaib Moon  MRN: [id number]  Date of Evaluation- 03/19/2020  PCP- Yaritza Levy MD    Future cases for Zohaib Moon [[id number]]     Case ID Status Date Time Nakul Procedure Provider Location    [id number] [facility] 3/23/2020 11:30  XI ROBOTIC PROSTATECTOMY Ti Maier MD [[id number]] [facility] OR 2ND FLR          HPI:  History of present illness- I had the pleasure of meeting this pleasant 65 y.o. gentleman in the pre op clinic prior to his elective Urological surgery. The patient is new to me .     I have obtained the history by speaking to the patient and by reviewing the electronic health records.    Events leading up to surgery / History of presenting illness -    Prostate cancer    On PSA monitoring   No pain from this .No problem urinating      Relevant health conditions of significance for the perioperative period/ History of presenting illness -    Subjectively describes health as good      He stays active   Semi retired   Works as a  ( Insurance work )    Lives with wife Shilpa , son Dc who is 31 Years who has autism    Health conditions of significance for the perioperative period     HTN     DM      Not known to have heart disease ,  Lung disease       Subjective/ Objective:       Chief complaint-Preoperative evaluation, Perioperative Medical management, complication reduction plan     Active cardiac conditions- none    Revised cardiac risk index predictors- none    Functional capacity -Examples of physical activity, does walking , rides a stationary bike for 25 minutes, 3 times a week,weights,    house work, can take 1 flight of stairs and cutting the grass with a mower\----- He can undertake all the above activities without  chest pain,chest tightness, Shortness of breath ,dizziness,lightheadedness making his exercise tolerance more,   than 4 Mets.       Review of Systems   Constitutional: Negative for chills and fever.         "  Weight gain from reduced activity   HENT:        RACHELG score 4 / 8        HTN  Age over 50 years  Neck size over 40 CM  Male gender       Eyes:        No unusual vision changes   Respiratory:        No cough , phlegm    No Hemoptysis   Cardiovascular:        As noted   Gastrointestinal:        Bowels- Regular   No overt GI blood losses  Had urinary bleeding after prostate biopsy ( in Feb 2020 ) lasted for 1 week - Was mild   Endocrine:        Prednisone use > 20 mg daily for 3 weeks- none    Genitourinary: Negative for dysuria.        No urinary hesitancy    Musculoskeletal:        No longer has knee pains   Was taking Meloxicam for about 6 weeks- holding 1 week pre op  Not known to have Rheumatoid arthritis   Skin: Negative for rash.   Neurological: Negative for syncope.        No unilateral weakness   Hematological:        Current use of Anticoagulants  None   Aspirin use for preventive reasons    Psychiatric/Behavioral:        No Depression  Supportive family        No vascular stenting     No past medical history pertinent negatives.        No anesthesia, bleeding, cardiac problems,PONV with previous surgeries/procedures.  Medications and Allergies reviewed in epic.   FH- No anesthesia,bleeding / venous thrombosis , problem in family     Physical Exam  Blood pressure (!) 148/74, pulse (!) 58, temperature 98.5 °F (36.9 °C), height 5' 10" (1.778 m), weight 99.1 kg (218 lb 8 oz), SpO2 100 %.      Physical Exam  Constitutional- Vitals - Body mass index is 31.35 kg/m².,   Vitals:    03/18/20 1122   BP: (!) 148/74   Pulse: (!) 58   Temp: 98.5 °F (36.9 °C)     General appearance-Conscious,Coherent  Eyes- No conjunctival icterus,pupils  round  and reactive to light   ENT-Oral cavity- moist  , Hearing grossly normal   Neck- No thyromegaly ,Trachea -central, No jugular venous distension,   No Carotid Bruit   Cardiovascular -Heart Sounds- Normal  and  no murmur   , No gallop rhythm   Respiratory - Normal Respiratory " Effort, Normal breath sounds,  no wheeze  and  no forced expiratory wheeze    Peripheral pitting pedal edema-- none , no calf pain   Gastrointestinal -Soft abdomen, No palpable masses, Non Tender,Liver,Spleen not palpable. No-- free fluid and shifting dullness  Musculoskeletal- No finger Clubbing. Strength grossly normal   Lymphatic-No Palpable cervical, axillary,Inguinal lymphadenopathy   Psychiatric - normal effect,Orientation  Rt Dorsalis pedis pulses-palpable    Lt Dorsalis pedis pulses- palpable   Rt Posterior tibial pulses -palpable   Left posterior tibial pulses -palpable   Miscellaneous -  no breast lumps and  no renal bruit   Pupils equal , reactive to light   Tried to examine optic fundus     Investigations  Lab and Imaging have been reviewed in epic.      Review of old records- Was done and information gathered regards to events leading to surgery and health conditions of significance in the perioperative period.        Preoperative cardiac risk assessment-  The patient does not have any active cardiac conditions . Revised cardiac risk index predictors-0 ---.Functional capacity is more than 4 Mets. He will be undergoing a Urological procedure that carries a intermediate risk     Risk of a major Cardiac event ( Defined as death, myocardial infarction, or cardiac arrest at 30 days after noncardiac surgery), based on RCRI score     -3.9%       No further cardiac work up is indicated prior to proceeding with the surgery          American Society of Anesthesiologists Physical status classification ( ASA ) class: 3     Postoperative pulmonary complication risk assessment:      ARISCAT ( Canet) risk index- risk class -  Low, if duration of surgery is under 3 hours, intermediate, if duration of surgery is over 3 hours      Asa Respiratory failure index- percentage risk of respiratory failure: 0.5 %     Assessment/Plan:     Hypertension  Carvedilol   Lisinopril    Recent BP readings in the  record-140/80  Hypertension-  Blood pressure is acceptable . I suggest continuation of  Carvedilol  - during the entire perioperative period. I suggest holding  Lisinopril- on the morning of the surgery and can continue that  post operatively under blood pressure, electrolyte and renal function monitoring as long as they are acceptable.I suggest addressing pain control as uncontrolled pain can increased blood pressure     Suggested tighter BP control , given Diabetes   Suggested Home BP monitoring     Suggested checking the accuracy of  home BP machine with that of the PCP    Suggested salt reduction    BP- Goals discusesd     Goal BP     Automated-120 to 125/<80  Manual -125 to 130/<80      Type 2 diabetes mellitus  Metformin     Type 2 Diabetes Mellitus  On treatment with oral agents  Not on  Insulin    Hemoglobin A1c- 6.7 0 per him- Dec 2019   Capillary glucose check-none  Suggested checking Glucose     No known DM complications   No lower extremity claudication       Hearing loss  Not wearing aids   Able to hold a conversation  As per him, very mild        Hypercholesterolemia  Atorvastatin    HLD-I  suggest continuation of statin during the entire perioperative period.    History of coronary angiogram  Late 90's/ early 2000's   Had chest pain while working as  ( stress ful job ) - Upper chest, un sure of relation to activity, no radiation   Had Cardiology evaluation   To his knowledge angiogram showed , no blockages   No chest pain since     Taking ASA 81 mh po daily since    Stress test 2015   Normal perfusion     Class 1 obesity with body mass index (BMI) of 31.0 to 31.9 in adult  Has HTN,, DM, HLD   Not known to have acid reflux, fatty liver   Reported snoring , but no apnea     Weight in pounds for a BMI of 25 is low 170's    Prostate cancer  For surgery     Snoring    Possible sleep apnea- I suggest a sleep study and suggest caution with usage of medication that can cause respiratory  suppression in the perioperative period  potential ramifications of untreated sleep apnea, which could include daytime sleepiness, hypertension, heart disease and stroke were discussed    Avoidance of  supine sleep, weight gain , alcoholic beverages , care with , sedative , CNS depressant use indicated  since all of these can worsen ELADIO     Suggested sleep evaluation     Wife has sleep apnea and is on CPAP    Family history of heart disease  No personal history of heart disease   No suggestions of symptomatic Coronary artery disease    Sinus bradycardia  Asymptomatic   Likely from Beta blocker use   Suggest markus pp Cardiac monitoring   Not known to have thyroid problems   No overt hypothyroid symptoms     Total bilirubin, elevated  Elevated total bilirubin   No suggestion of  hepatic decompensation - suggested follow up   No easy bruising , bleeding         Preventive perioperative care    Thromboembolic prophylaxis:  His risk factors for thrombosis include cancer  surgical procedure and age.I suggest  thromboembolic prophylaxis ( mechanical/pharmacological, weighing the risk benefits of pharmacological agent use considering [facility] procedural bleeding )  during the perioperative period.I suggested being active in the post operative period.      Postoperative pulmonary complication prophylaxis-Risk factors for post operative pulmonary complications include ASA class >2 and proximity of the surgical site to the lungs- I suggest incentive spirometry use, early ambulation, end tidal carbon dioxide monitoring and pain control so as to avoid diaphragmatic splinting  , oral care , head side of bed elevation      Renal complication prophylaxis-Risk factors for renal complications include diabetes mellitus and hypertension . I suggest keeping him well hydrated  in the perioperative period.     Surgical site Infection Prophylaxis-I  suggest appropriate antibiotic for Prophylaxis against Surgical site infections       In view of  urological procedure the patient  is at risk of postoperative urinary retention.  I suggest avoidance / minimizing the of  Benzodiazepines,Anticholinergic medication,antihistamines ( Benadryl) , if possible in the perioperative period. I suggest using the minimum possible use of opioids for the minimum period of time in the perioperative period. Benadryl avoidance suggested      This visit was focused on Preoperative evaluation, Perioperative Medical management, complication reduction plans. I suggest that the patient follows up with primary care or relevant sub specialists for ongoing health care.    I appreciate the opportunity to be involved in this patients care. Please feel free to contact me if there were any questions about this consultation.    Patient is optimized     Patient  was instructed to call and update me about any changes to health,  medication, office visits ,testing out side of the [facility] , hospitalizations between now and surgery     Lucila Becerra MD  Perioperative Medicine  [facility]   Pager [phone]    He is due for an eye exam   -  3/18- 14 06     EKG from 3/18 personally reviewed showed anterior T wave changes   Compared EKG with July 2013 - changes are not new   Called  And spoke to him   --  3/18- 14 46     Hb, HCT,PLT- N  Elevated BUN- suggested hydration   Elevated Bicarbonate - points towards sleep apnea     Elevated total bilirubin   No suggestion of  hepatic decompensation - suggested follow up     EKG report     Sinus bradycardia with 1st degree A-V block  Nonspecific ST-t wave abnormality  Abnormal ECG  No previous ECGs available    I had educated that uncontrolled DM can cause post op complications,risk of infection, wound healing problem,increased length of stay in hospital and its associated complications.I suggest exercise as much as possible and follow diabetic diet    Re examined pupils   Equal / mildly Bigger Rt pupil   Re  examined discs- Left disc no papilledema  Unable to see Rt disc   Suggested follow up   -  3/19- 13 05     EKG report   Sinus bradycardia with 1st degree A-V block  Nonspecific ST-t wave abnormality  Abnormal ECG  No previous ECGs available    Chart review suggests that surgery may have been postponed , due to the Corona virus situation     Will obtain Cardiology records from Dr Omar Rodriguez - Requested office to obtain them  ( Office note, EKG)  -  5/1- 19 35     Surgery scheduled for 5/6  Called to follow up   Unable to speak   Left a message   -  5/5- 18 23     Bilirubin 1.2 on 1/17/2020   Cardiology records   Stress test from 2013 -     Called to follow up , spoke to him to address any concerns with the up coming surgery or any questions on Medication instructions -  Doing well ,No changes to Medication, Health -  Not monitoring glucose   Gained 5 pounds due to reduced activity during the pandemic     COVID screening     No fever -  No cough -  No SOB-  No sore throat -  No loss of taste or smell -  No muscle aches -  No nausea, vomiting , diarrhea-    No itching , pale stool , dark urine   Suggested reducing alcohol use    Off Meloxicam for 4 week- feels fine

## 2020-03-18 NOTE — ASSESSMENT & PLAN NOTE
Late 90's/ early 2000's   Had chest pain while working as  ( stress ful job ) - Upper chest, un sure of relation to activity, no radiation   Had Cardiology evaluation   To his knowledge angiogram showed , no blockages   No chest pain since     Taking ASA 81 mh po daily since    Stress test 2015   Normal perfusion

## 2020-03-18 NOTE — OUTPATIENT SUBJECTIVE & OBJECTIVE
Outpatient Subjective & Objective     Chief complaint-Preoperative evaluation, Perioperative Medical management, complication reduction plan     Active cardiac conditions- none    Revised cardiac risk index predictors- none    Functional capacity -Examples of physical activity, does walking , rides a stationary bike for 25 minutes, 3 times a week,weights,    house work, can take 1 flight of stairs and cutting the grass with a mower\----- He can undertake all the above activities without  chest pain,chest tightness, Shortness of breath ,dizziness,lightheadedness making his exercise tolerance more,   than 4 Mets.       Review of Systems   Constitutional: Negative for chills and fever.          Weight gain from reduced activity   HENT:        STOPBANG score 4 / 8        HTN  Age over 50 years  Neck size over 40 CM  Male gender       Eyes:        No unusual vision changes   Respiratory:        No cough , phlegm    No Hemoptysis   Cardiovascular:        As noted   Gastrointestinal:        Bowels- Regular   No overt GI blood losses  Had urinary bleeding after prostate biopsy ( in Feb 2020 ) lasted for 1 week - Was mild   Endocrine:        Prednisone use > 20 mg daily for 3 weeks- none    Genitourinary: Negative for dysuria.        No urinary hesitancy    Musculoskeletal:        No longer has knee pains   Was taking Meloxicam for about 6 weeks- holding 1 week pre op  Not known to have Rheumatoid arthritis   Skin: Negative for rash.   Neurological: Negative for syncope.        No unilateral weakness   Hematological:        Current use of Anticoagulants  None   Aspirin use for preventive reasons    Psychiatric/Behavioral:        No Depression  Supportive family        No vascular stenting     No past medical history pertinent negatives.        No anesthesia, bleeding, cardiac problems,PONV with previous surgeries/procedures.  Medications and Allergies reviewed in epic.   FH- No anesthesia,bleeding / venous thrombosis ,  "problem in family     Physical Exam  Blood pressure (!) 148/74, pulse (!) 58, temperature 98.5 °F (36.9 °C), height 5' 10" (1.778 m), weight 99.1 kg (218 lb 8 oz), SpO2 100 %.      Physical Exam  Constitutional- Vitals - Body mass index is 31.35 kg/m².,   Vitals:    03/18/20 1122   BP: (!) 148/74   Pulse: (!) 58   Temp: 98.5 °F (36.9 °C)     General appearance-Conscious,Coherent  Eyes- No conjunctival icterus,pupils  round  and reactive to light   ENT-Oral cavity- moist  , Hearing grossly normal   Neck- No thyromegaly ,Trachea -central, No jugular venous distension,   No Carotid Bruit   Cardiovascular -Heart Sounds- Normal  and  no murmur   , No gallop rhythm   Respiratory - Normal Respiratory Effort, Normal breath sounds,  no wheeze  and  no forced expiratory wheeze    Peripheral pitting pedal edema-- none , no calf pain   Gastrointestinal -Soft abdomen, No palpable masses, Non Tender,Liver,Spleen not palpable. No-- free fluid and shifting dullness  Musculoskeletal- No finger Clubbing. Strength grossly normal   Lymphatic-No Palpable cervical, axillary,Inguinal lymphadenopathy   Psychiatric - normal effect,Orientation  Rt Dorsalis pedis pulses-palpable    Lt Dorsalis pedis pulses- palpable   Rt Posterior tibial pulses -palpable   Left posterior tibial pulses -palpable   Miscellaneous -  no breast lumps and  no renal bruit   Pupils equal , reactive to light   Tried to examine optic fundus     Investigations  Lab and Imaging have been reviewed in epic.      Review of old records- Was done and information gathered regards to events leading to surgery and health conditions of significance in the perioperative period.    Outpatient Subjective & Objective   "

## 2020-03-18 NOTE — ASSESSMENT & PLAN NOTE
Has HTN,, DM, HLD   Not known to have acid reflux, fatty liver   Reported snoring , but no apnea     Weight in pounds for a BMI of 25 is low 170's

## 2020-03-18 NOTE — HPI
History of present illness- I had the pleasure of meeting this pleasant 65 y.o. gentleman in the pre op clinic prior to his elective Urological surgery. The patient is new to me .     I have obtained the history by speaking to the patient and by reviewing the electronic health records.    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

## 2020-03-18 NOTE — PROGRESS NOTES
Urology ([facility]) H&P for upcoming procedure  Staff:  Jakob Maier MD    Is this patient in a research study?  No    CC: prostate adencarcinoma    HPI:  Zohaib Moon is a 65 y.o. male with [id number] [place] 3+3=6 prostate adenocarcinoma.      The patient initially presented with no symptoms.   He is continent and potent.     MRI 12/9/2019- 60 ccs, PI-RADS 4 1.1 cm anterior lesion. Negative extra prostatic extension, seminal vesicle.    Target fusion biopsy 1/21/2019 revealed the following:  Jose 6, left anterior target 4/7 28%  right apex 1/1 6%, left apex 1/1 6%,   left middle 2/2 50%,   left lateral apex 1/1 7%, left lateral mid 1/1 67%, left lateral base 1/1 8%.   Overall 11/20 positive cores.    PSA: 6.2  Volume: 60grams  Voiding complaints: absent  ED: absent  Incontinence: absent    MARILYN  3 - low risk    ROS:  Neg except per HPI, specifically no bone pain, no unintentional weight loss, no anorexia, no night sweats    Past Medical History:   Diagnosis Date    Diabetes mellitus     Diabetes mellitus, type 2     Elevated PSA     Hyperlipidemia     Hypertension        Past Surgical History:   Procedure Laterality Date    APPENDECTOMY      LIPOMA RESECTION  2018    TRUS Bx  01/15/2020    VASECTOMY         Social History     Socioeconomic History    Marital status:      Spouse name: Shilpa    Number of children: 2    Years of education: 16    Highest education level: Bachelor's degree (e.g., BA, AB, BS)   Occupational History    Not on file   Social Needs    Financial resource strain: Patient refused    Food insecurity:     Worry: Patient refused     Inability: Patient refused    Transportation needs:     Medical: Patient refused     Non-medical: Patient refused   Tobacco Use    Smoking status: Never Smoker    Smokeless tobacco: Never Used   Substance and Sexual Activity    Alcohol use: Yes     Alcohol/week: 8.0 standard drinks     Types: 8 Shots of liquor per week     Comment: 2-6  "drinks a week    Drug use: Never    Sexual activity: Yes     Partners: Female   Lifestyle    Physical activity:     Days per week: 2 days     Minutes per session: 20 min    Stress: Only a little   Relationships    Social connections:     Talks on phone: Patient refused     Gets together: Patient refused     Attends Baptism service: Patient refused     Active member of club or organization: Patient refused     Attends meetings of clubs or organizations: Patient refused     Relationship status: Patient refused   Other Topics Concern    Not on file   Social History Narrative    Not on file       Family History   Problem Relation Age of Onset    Heart disease Father     Kidney disease Father     Diabetes Father     Heart disease Mother        Review of patient's allergies indicates:  No Known Allergies    Current Outpatient Medications on File Prior to Visit   Medication Sig Dispense Refill    aspirin (ECOTRIN) 81 MG EC tablet Take 81 mg by mouth once daily.      atorvastatin (LIPITOR) 20 MG tablet   See Instructions, TAKE 1 TABLET BY MOUTH EVERY DAY, # 90 tab(s), 3 Refill(s), Pharmacy: [facility]/pharmacy #[id number]      carvediloL (COREG) 6.25 MG tablet   See Instructions, TAKE 1 TABLET BY MOUTH TWICE A DAY, # 180 tab(s), 3 Refill(s), Pharmacy: [facility]/pharmacy #[id number]      lisinopril (PRINIVIL,ZESTRIL) 20 MG tablet   See Instructions, TAKE 1 TABLET BY MOUTH EVERY DAY, # 90 tab(s), 3 Refill(s), Pharmacy: [facility]/pharmacy #[id number]      meloxicam (MOBIC) 7.5 MG tablet Take 7.5 mg by mouth once daily.       metFORMIN (GLUCOPHAGE) 500 MG tablet   See Instructions, TAKE 1 TABLET BY MOUTH TWICE A DAY, # 180 tab(s), 3 Refill(s), Pharmacy: [facility]/pharmacy #[id number]       No current facility-administered medications on file prior to visit.        Anticoagulation:  Yes ASA 81mg    Physical Exam:  weight 218 lb/99 kg  Height 5'10"    There is no height or weight on file to calculate BMI.    AAOx4, NAD, WDWN  NC/AT, EOMI, PER, sclerae " anicteric, speech normal, tongue midline  Nl effort, CTAB  RRR  Soft, non-tender, non-distended   Scars: None.   Prostate Deferred       Labs:    Urine dipstick today shows trace leukocytes and trace of blood.      No results found for: WBC, HGB, HCT, MCV, PLT    Pending results for A1c, CMP and CBC. Drawn today 3/18/20    BMP  No results found for: NA, K, CL, CO2, BUN, CREATININE, CALCIUM, ANIONGAP, ESTGFRAFRICA, EGFRNONAA    No results found for: PSA, PSADIAG, PSATOTAL, PSAFREE, PSAFREEPCT    Imaging:    mpMRI prostate (3/12/2020) 60 ccs, PI-RADS 4 1.1 cm anterior lesion. Negative extra prostatic extension, seminal vesicle.      Assessment: Zohaib Moon is a 65 y.o. male with [id number] [place] 3+3=6 prostate adenocarcinoma.      Plan:   1. To OR on 3/23/837862 for RALP with BPLND  2. Consents signed   3. I have explained the risk, benefits, and alternatives of the procedure in detail. The patient voices understanding and all questions have been answered. The patient agrees to proceed as planned.   4. Cleared by Dr. Becerra for surgery without any additional work-up 3/18/2020. Patient is optimized.   5. Will follow up pre-op labs, namely: A1c, CMP, CBC.   6. Will send urine for culture since he is having trace blood and leukocytes,      Millie Mercado MD

## 2020-03-18 NOTE — ASSESSMENT & PLAN NOTE
Possible sleep apnea- I suggest a sleep study and suggest caution with usage of medication that can cause respiratory suppression in the perioperative period  potential ramifications of untreated sleep apnea, which could include daytime sleepiness, hypertension, heart disease and stroke were discussed    Avoidance of  supine sleep, weight gain , alcoholic beverages , care with , sedative , CNS depressant use indicated  since all of these can worsen ELADIO     Suggested sleep evaluation     Wife has sleep apnea and is on CPAP

## 2020-03-18 NOTE — ASSESSMENT & PLAN NOTE
Asymptomatic   Likely from Beta blocker use   Suggest markus pp Cardiac monitoring   Not known to have thyroid problems   No overt hypothyroid symptoms

## 2020-03-19 LAB — BACTERIA UR CULT: NO GROWTH

## 2020-04-21 ENCOUNTER — PATIENT MESSAGE (OUTPATIENT)
Dept: UROLOGY | Facility: CLINIC | Age: 65
End: 2020-04-21

## 2020-05-05 ENCOUNTER — TELEPHONE (OUTPATIENT)
Dept: UROLOGY | Facility: CLINIC | Age: 65
End: 2020-05-05

## 2020-05-05 DIAGNOSIS — C61 PROSTATE CANCER: Primary | ICD-10-CM

## 2020-05-06 ENCOUNTER — ANESTHESIA (OUTPATIENT)
Dept: SURGERY | Facility: HOSPITAL | Age: 65
End: 2020-05-06
Payer: COMMERCIAL

## 2020-05-06 ENCOUNTER — LAB VISIT (OUTPATIENT)
Dept: INTERNAL MEDICINE | Facility: CLINIC | Age: 65
End: 2020-05-06
Payer: COMMERCIAL

## 2020-05-06 ENCOUNTER — HOSPITAL ENCOUNTER (OUTPATIENT)
Facility: HOSPITAL | Age: 65
Discharge: HOME | End: 2020-05-07
Attending: UROLOGY | Admitting: UROLOGY
Payer: COMMERCIAL

## 2020-05-06 DIAGNOSIS — Z01.818 PREOP TESTING: ICD-10-CM

## 2020-05-06 DIAGNOSIS — C61 PROSTATE CANCER: ICD-10-CM

## 2020-05-06 DIAGNOSIS — C61 PROSTATE CANCER: Primary | ICD-10-CM

## 2020-05-06 LAB
ABO + RH BLD: NORMAL
BLD GP AB SCN CELLS X3 SERPL QL: NORMAL
POCT GLUCOSE: 125 MG/DL (ref 70–110)
POCT GLUCOSE: 188 MG/DL (ref 70–110)
POCT GLUCOSE: 205 MG/DL (ref 70–110)
SARS-COV-2 RNA RESP QL NAA+PROBE: NOT DETECTED

## 2020-05-06 PROCEDURE — U0002 COVID-19 LAB TEST NON-CDC: HCPCS

## 2020-05-06 PROCEDURE — 86901 BLOOD TYPING SEROLOGIC RH(D): CPT

## 2020-05-06 PROCEDURE — 37000008 HC ANESTHESIA 1ST 15 MINUTES: Performed by: UROLOGY

## 2020-05-06 PROCEDURE — D9220A PRA ANESTHESIA: ICD-10-PCS | Mod: CRNA,,, | Performed by: NURSE ANESTHETIST, CERTIFIED REGISTERED

## 2020-05-06 PROCEDURE — 25000003 PHARM REV CODE 250: Performed by: NURSE ANESTHETIST, CERTIFIED REGISTERED

## 2020-05-06 PROCEDURE — 71000033 HC RECOVERY, INTIAL HOUR: Performed by: UROLOGY

## 2020-05-06 PROCEDURE — 88305 TISSUE EXAM BY PATHOLOGIST: ICD-10-PCS | Mod: 26,,, | Performed by: PATHOLOGY

## 2020-05-06 PROCEDURE — 64461 PVB THORACIC SINGLE INJ SITE: CPT | Performed by: STUDENT IN AN ORGANIZED HEALTH CARE EDUCATION/TRAINING PROGRAM

## 2020-05-06 PROCEDURE — 88309 TISSUE EXAM BY PATHOLOGIST: CPT | Performed by: PATHOLOGY

## 2020-05-06 PROCEDURE — C1729 CATH, DRAINAGE: HCPCS | Performed by: UROLOGY

## 2020-05-06 PROCEDURE — 88305 TISSUE EXAM BY PATHOLOGIST: CPT | Mod: 26,,, | Performed by: PATHOLOGY

## 2020-05-06 PROCEDURE — 63600175 PHARM REV CODE 636 W HCPCS: Performed by: NURSE ANESTHETIST, CERTIFIED REGISTERED

## 2020-05-06 PROCEDURE — 38571 PR LAP,PELVIC LYMPHADENECTOMY: ICD-10-PCS | Mod: 51,,, | Performed by: UROLOGY

## 2020-05-06 PROCEDURE — 86355 B CELLS TOTAL COUNT: CPT | Performed by: PATHOLOGY

## 2020-05-06 PROCEDURE — 88307 TISSUE EXAM BY PATHOLOGIST: CPT | Performed by: PATHOLOGY

## 2020-05-06 PROCEDURE — 76942 ECHO GUIDE FOR BIOPSY: CPT | Performed by: STUDENT IN AN ORGANIZED HEALTH CARE EDUCATION/TRAINING PROGRAM

## 2020-05-06 PROCEDURE — 27200750 HC INSULATED NEEDLE/ STIMUPLEX: Performed by: STUDENT IN AN ORGANIZED HEALTH CARE EDUCATION/TRAINING PROGRAM

## 2020-05-06 PROCEDURE — D9220A PRA ANESTHESIA: Mod: CRNA,,, | Performed by: NURSE ANESTHETIST, CERTIFIED REGISTERED

## 2020-05-06 PROCEDURE — 94799 UNLISTED PULMONARY SVC/PX: CPT

## 2020-05-06 PROCEDURE — 63600175 PHARM REV CODE 636 W HCPCS: Performed by: STUDENT IN AN ORGANIZED HEALTH CARE EDUCATION/TRAINING PROGRAM

## 2020-05-06 PROCEDURE — 63600175 PHARM REV CODE 636 W HCPCS: Performed by: ANESTHESIOLOGY

## 2020-05-06 PROCEDURE — 82962 GLUCOSE BLOOD TEST: CPT | Performed by: UROLOGY

## 2020-05-06 PROCEDURE — 37000009 HC ANESTHESIA EA ADD 15 MINS: Performed by: UROLOGY

## 2020-05-06 PROCEDURE — 38571 LAPAROSCOPY LYMPHADENECTOMY: CPT | Mod: 51,,, | Performed by: UROLOGY

## 2020-05-06 PROCEDURE — D9220A PRA ANESTHESIA: ICD-10-PCS | Mod: ANES,,, | Performed by: ANESTHESIOLOGY

## 2020-05-06 PROCEDURE — 88309 PR  SURG PATH,LEVEL VI: ICD-10-PCS | Mod: 26,,, | Performed by: PATHOLOGY

## 2020-05-06 PROCEDURE — 71000015 HC POSTOP RECOV 1ST HR: Performed by: UROLOGY

## 2020-05-06 PROCEDURE — 55866 LAPS SURG PRST8ECT RPBIC RAD: CPT | Mod: ,,, | Performed by: UROLOGY

## 2020-05-06 PROCEDURE — D9220A PRA ANESTHESIA: Mod: ANES,,, | Performed by: ANESTHESIOLOGY

## 2020-05-06 PROCEDURE — S0020 INJECTION, BUPIVICAINE HYDRO: HCPCS | Performed by: STUDENT IN AN ORGANIZED HEALTH CARE EDUCATION/TRAINING PROGRAM

## 2020-05-06 PROCEDURE — 64461 PVB THORACIC SINGLE INJ SITE: CPT | Mod: 50,59,, | Performed by: ANESTHESIOLOGY

## 2020-05-06 PROCEDURE — 94761 N-INVAS EAR/PLS OXIMETRY MLT: CPT

## 2020-05-06 PROCEDURE — 25000003 PHARM REV CODE 250: Performed by: STUDENT IN AN ORGANIZED HEALTH CARE EDUCATION/TRAINING PROGRAM

## 2020-05-06 PROCEDURE — 71000016 HC POSTOP RECOV ADDL HR: Performed by: UROLOGY

## 2020-05-06 PROCEDURE — 99900035 HC TECH TIME PER 15 MIN (STAT)

## 2020-05-06 PROCEDURE — 11000001 HC ACUTE MED/SURG PRIVATE ROOM

## 2020-05-06 PROCEDURE — 88309 TISSUE EXAM BY PATHOLOGIST: CPT | Mod: 26,,, | Performed by: PATHOLOGY

## 2020-05-06 PROCEDURE — 36000713 HC OR TIME LEV V EA ADD 15 MIN: Performed by: UROLOGY

## 2020-05-06 PROCEDURE — 27201423 OPTIME MED/SURG SUP & DEVICES STERILE SUPPLY: Performed by: UROLOGY

## 2020-05-06 PROCEDURE — 55866 PR LAP,PROSTATECTOMY,RADICAL,W/NERVE SPARE,INCL ROBOTIC: ICD-10-PCS | Mod: ,,, | Performed by: UROLOGY

## 2020-05-06 PROCEDURE — S0028 INJECTION, FAMOTIDINE, 20 MG: HCPCS | Performed by: NURSE ANESTHETIST, CERTIFIED REGISTERED

## 2020-05-06 PROCEDURE — 64461: ICD-10-PCS | Mod: 50,59,, | Performed by: ANESTHESIOLOGY

## 2020-05-06 PROCEDURE — 36000712 HC OR TIME LEV V 1ST 15 MIN: Performed by: UROLOGY

## 2020-05-06 RX ORDER — METHOCARBAMOL 500 MG/1
1000 TABLET, FILM COATED ORAL EVERY 6 HOURS PRN
Status: DISCONTINUED | OUTPATIENT
Start: 2020-05-06 | End: 2020-05-07 | Stop reason: HOSPADM

## 2020-05-06 RX ORDER — POLYETHYLENE GLYCOL 3350 17 G/17G
17 POWDER, FOR SOLUTION ORAL DAILY
Status: DISCONTINUED | OUTPATIENT
Start: 2020-05-07 | End: 2020-05-07 | Stop reason: HOSPADM

## 2020-05-06 RX ORDER — SODIUM CHLORIDE 0.9 % (FLUSH) 0.9 %
10 SYRINGE (ML) INJECTION
Status: DISCONTINUED | OUTPATIENT
Start: 2020-05-06 | End: 2020-05-07 | Stop reason: HOSPADM

## 2020-05-06 RX ORDER — ONDANSETRON 2 MG/ML
INJECTION INTRAMUSCULAR; INTRAVENOUS
Status: DISCONTINUED | OUTPATIENT
Start: 2020-05-06 | End: 2020-05-06

## 2020-05-06 RX ORDER — EPHEDRINE SULFATE 50 MG/ML
INJECTION, SOLUTION INTRAVENOUS
Status: DISCONTINUED | OUTPATIENT
Start: 2020-05-06 | End: 2020-05-06

## 2020-05-06 RX ORDER — CEFAZOLIN SODIUM 1 G/3ML
2 INJECTION, POWDER, FOR SOLUTION INTRAMUSCULAR; INTRAVENOUS
Status: COMPLETED | OUTPATIENT
Start: 2020-05-06 | End: 2020-05-06

## 2020-05-06 RX ORDER — PREGABALIN 150 MG/1
150 CAPSULE ORAL
Status: COMPLETED | OUTPATIENT
Start: 2020-05-06 | End: 2020-05-06

## 2020-05-06 RX ORDER — ACETAMINOPHEN 500 MG
1000 TABLET ORAL EVERY 6 HOURS
Status: DISCONTINUED | OUTPATIENT
Start: 2020-05-06 | End: 2020-05-07 | Stop reason: HOSPADM

## 2020-05-06 RX ORDER — LIDOCAINE HCL/PF 100 MG/5ML
SYRINGE (ML) INTRAVENOUS
Status: DISCONTINUED | OUTPATIENT
Start: 2020-05-06 | End: 2020-05-06

## 2020-05-06 RX ORDER — PHENYLEPHRINE HYDROCHLORIDE 10 MG/ML
INJECTION INTRAVENOUS
Status: DISCONTINUED | OUTPATIENT
Start: 2020-05-06 | End: 2020-05-06

## 2020-05-06 RX ORDER — SUCCINYLCHOLINE CHLORIDE 20 MG/ML
INJECTION INTRAMUSCULAR; INTRAVENOUS
Status: DISCONTINUED | OUTPATIENT
Start: 2020-05-06 | End: 2020-05-06

## 2020-05-06 RX ORDER — SODIUM CHLORIDE 9 MG/ML
INJECTION, SOLUTION INTRAVENOUS CONTINUOUS
Status: DISCONTINUED | OUTPATIENT
Start: 2020-05-06 | End: 2020-05-06

## 2020-05-06 RX ORDER — ONDANSETRON 2 MG/ML
4 INJECTION INTRAMUSCULAR; INTRAVENOUS ONCE AS NEEDED
Status: DISCONTINUED | OUTPATIENT
Start: 2020-05-06 | End: 2020-05-06 | Stop reason: HOSPADM

## 2020-05-06 RX ORDER — ONDANSETRON 8 MG/1
8 TABLET, ORALLY DISINTEGRATING ORAL EVERY 6 HOURS PRN
Status: DISCONTINUED | OUTPATIENT
Start: 2020-05-06 | End: 2020-05-07 | Stop reason: HOSPADM

## 2020-05-06 RX ORDER — GLUCAGON 1 MG
1 KIT INJECTION
Status: DISCONTINUED | OUTPATIENT
Start: 2020-05-06 | End: 2020-05-07 | Stop reason: HOSPADM

## 2020-05-06 RX ORDER — PROPOFOL 10 MG/ML
INJECTION, EMULSION INTRAVENOUS
Status: DISCONTINUED | OUTPATIENT
Start: 2020-05-06 | End: 2020-05-06

## 2020-05-06 RX ORDER — IBUPROFEN 200 MG
24 TABLET ORAL
Status: DISCONTINUED | OUTPATIENT
Start: 2020-05-06 | End: 2020-05-07 | Stop reason: HOSPADM

## 2020-05-06 RX ORDER — BUPIVACAINE HYDROCHLORIDE 7.5 MG/ML
INJECTION, SOLUTION EPIDURAL; RETROBULBAR
Status: DISCONTINUED | OUTPATIENT
Start: 2020-05-06 | End: 2020-05-06

## 2020-05-06 RX ORDER — CARVEDILOL 6.25 MG/1
6.25 TABLET ORAL 2 TIMES DAILY
Status: DISCONTINUED | OUTPATIENT
Start: 2020-05-07 | End: 2020-05-07 | Stop reason: HOSPADM

## 2020-05-06 RX ORDER — FENTANYL CITRATE 50 UG/ML
25 INJECTION, SOLUTION INTRAMUSCULAR; INTRAVENOUS EVERY 5 MIN PRN
Status: DISCONTINUED | OUTPATIENT
Start: 2020-05-06 | End: 2020-05-06

## 2020-05-06 RX ORDER — ROCURONIUM BROMIDE 10 MG/ML
INJECTION, SOLUTION INTRAVENOUS
Status: DISCONTINUED | OUTPATIENT
Start: 2020-05-06 | End: 2020-05-06

## 2020-05-06 RX ORDER — FENTANYL CITRATE 50 UG/ML
25 INJECTION, SOLUTION INTRAMUSCULAR; INTRAVENOUS EVERY 5 MIN PRN
Status: COMPLETED | OUTPATIENT
Start: 2020-05-06 | End: 2020-05-06

## 2020-05-06 RX ORDER — KETOROLAC TROMETHAMINE 15 MG/ML
15 INJECTION, SOLUTION INTRAMUSCULAR; INTRAVENOUS EVERY 8 HOURS
Status: DISCONTINUED | OUTPATIENT
Start: 2020-05-06 | End: 2020-05-07 | Stop reason: HOSPADM

## 2020-05-06 RX ORDER — KETAMINE HCL IN 0.9 % NACL 50 MG/5 ML
SYRINGE (ML) INTRAVENOUS
Status: DISCONTINUED | OUTPATIENT
Start: 2020-05-06 | End: 2020-05-06

## 2020-05-06 RX ORDER — KETOROLAC TROMETHAMINE 30 MG/ML
15 INJECTION, SOLUTION INTRAMUSCULAR; INTRAVENOUS
Status: COMPLETED | OUTPATIENT
Start: 2020-05-06 | End: 2020-05-06

## 2020-05-06 RX ORDER — INSULIN ASPART 100 [IU]/ML
1-10 INJECTION, SOLUTION INTRAVENOUS; SUBCUTANEOUS
Status: DISCONTINUED | OUTPATIENT
Start: 2020-05-06 | End: 2020-05-07 | Stop reason: HOSPADM

## 2020-05-06 RX ORDER — LIDOCAINE HYDROCHLORIDE 10 MG/ML
1 INJECTION, SOLUTION EPIDURAL; INFILTRATION; INTRACAUDAL; PERINEURAL ONCE
Status: COMPLETED | OUTPATIENT
Start: 2020-05-06 | End: 2020-05-06

## 2020-05-06 RX ORDER — OXYCODONE HYDROCHLORIDE 5 MG/1
5 TABLET ORAL EVERY 4 HOURS PRN
Status: DISCONTINUED | OUTPATIENT
Start: 2020-05-06 | End: 2020-05-07 | Stop reason: HOSPADM

## 2020-05-06 RX ORDER — PREGABALIN 150 MG/1
150 CAPSULE ORAL NIGHTLY
Status: DISCONTINUED | OUTPATIENT
Start: 2020-05-06 | End: 2020-05-07 | Stop reason: HOSPADM

## 2020-05-06 RX ORDER — ATORVASTATIN CALCIUM 20 MG/1
20 TABLET, FILM COATED ORAL DAILY
Status: DISCONTINUED | OUTPATIENT
Start: 2020-05-07 | End: 2020-05-07 | Stop reason: HOSPADM

## 2020-05-06 RX ORDER — FENTANYL CITRATE 50 UG/ML
INJECTION, SOLUTION INTRAMUSCULAR; INTRAVENOUS
Status: DISCONTINUED | OUTPATIENT
Start: 2020-05-06 | End: 2020-05-06

## 2020-05-06 RX ORDER — HYDROMORPHONE HYDROCHLORIDE 1 MG/ML
0.2 INJECTION, SOLUTION INTRAMUSCULAR; INTRAVENOUS; SUBCUTANEOUS EVERY 5 MIN PRN
Status: DISCONTINUED | OUTPATIENT
Start: 2020-05-06 | End: 2020-05-06 | Stop reason: HOSPADM

## 2020-05-06 RX ORDER — SODIUM CHLORIDE 9 MG/ML
INJECTION, SOLUTION INTRAVENOUS CONTINUOUS
Status: DISCONTINUED | OUTPATIENT
Start: 2020-05-06 | End: 2020-05-07 | Stop reason: HOSPADM

## 2020-05-06 RX ORDER — LISINOPRIL 20 MG/1
20 TABLET ORAL DAILY
Status: DISCONTINUED | OUTPATIENT
Start: 2020-05-07 | End: 2020-05-07 | Stop reason: HOSPADM

## 2020-05-06 RX ORDER — IBUPROFEN 200 MG
16 TABLET ORAL
Status: DISCONTINUED | OUTPATIENT
Start: 2020-05-06 | End: 2020-05-07 | Stop reason: HOSPADM

## 2020-05-06 RX ORDER — MIDAZOLAM HYDROCHLORIDE 1 MG/ML
0.5 INJECTION INTRAMUSCULAR; INTRAVENOUS
Status: DISCONTINUED | OUTPATIENT
Start: 2020-05-06 | End: 2020-05-06

## 2020-05-06 RX ORDER — ASPIRIN 81 MG/1
81 TABLET ORAL DAILY
Status: DISCONTINUED | OUTPATIENT
Start: 2020-05-07 | End: 2020-05-07 | Stop reason: HOSPADM

## 2020-05-06 RX ORDER — HEPARIN SODIUM 5000 [USP'U]/ML
5000 INJECTION, SOLUTION INTRAVENOUS; SUBCUTANEOUS EVERY 8 HOURS
Status: DISCONTINUED | OUTPATIENT
Start: 2020-05-06 | End: 2020-05-07 | Stop reason: HOSPADM

## 2020-05-06 RX ORDER — DEXAMETHASONE SODIUM PHOSPHATE 4 MG/ML
INJECTION, SOLUTION INTRA-ARTICULAR; INTRALESIONAL; INTRAMUSCULAR; INTRAVENOUS; SOFT TISSUE
Status: DISCONTINUED | OUTPATIENT
Start: 2020-05-06 | End: 2020-05-06

## 2020-05-06 RX ORDER — ACETAMINOPHEN 500 MG
1000 TABLET ORAL
Status: COMPLETED | OUTPATIENT
Start: 2020-05-06 | End: 2020-05-06

## 2020-05-06 RX ORDER — PANTOPRAZOLE SODIUM 40 MG/1
40 TABLET, DELAYED RELEASE ORAL DAILY
Status: DISCONTINUED | OUTPATIENT
Start: 2020-05-07 | End: 2020-05-07 | Stop reason: HOSPADM

## 2020-05-06 RX ORDER — HEPARIN SODIUM 5000 [USP'U]/ML
5000 INJECTION, SOLUTION INTRAVENOUS; SUBCUTANEOUS
Status: COMPLETED | OUTPATIENT
Start: 2020-05-06 | End: 2020-05-06

## 2020-05-06 RX ORDER — GLYCOPYRROLATE 0.2 MG/ML
INJECTION INTRAMUSCULAR; INTRAVENOUS
Status: DISCONTINUED | OUTPATIENT
Start: 2020-05-06 | End: 2020-05-06

## 2020-05-06 RX ORDER — FAMOTIDINE 10 MG/ML
INJECTION INTRAVENOUS
Status: DISCONTINUED | OUTPATIENT
Start: 2020-05-06 | End: 2020-05-06

## 2020-05-06 RX ADMIN — PHENYLEPHRINE HYDROCHLORIDE 100 MCG: 10 INJECTION INTRAVENOUS at 02:05

## 2020-05-06 RX ADMIN — FENTANYL CITRATE 25 MCG: 50 INJECTION INTRAMUSCULAR; INTRAVENOUS at 05:05

## 2020-05-06 RX ADMIN — ACETAMINOPHEN 1000 MG: 500 TABLET ORAL at 11:05

## 2020-05-06 RX ADMIN — ONDANSETRON 4 MG: 2 INJECTION, SOLUTION INTRAMUSCULAR; INTRAVENOUS at 04:05

## 2020-05-06 RX ADMIN — LIDOCAINE HYDROCHLORIDE 2 MG: 10 INJECTION, SOLUTION EPIDURAL; INFILTRATION; INTRACAUDAL; PERINEURAL at 12:05

## 2020-05-06 RX ADMIN — GLYCOPYRROLATE 0.2 MG: 0.2 INJECTION, SOLUTION INTRAMUSCULAR; INTRAVENOUS at 01:05

## 2020-05-06 RX ADMIN — BUPIVACAINE HYDROCHLORIDE 30 ML: 7.5 INJECTION, SOLUTION EPIDURAL; RETROBULBAR at 12:05

## 2020-05-06 RX ADMIN — PHENYLEPHRINE HYDROCHLORIDE 100 MCG: 10 INJECTION INTRAVENOUS at 01:05

## 2020-05-06 RX ADMIN — LIDOCAINE HYDROCHLORIDE 100 MG: 20 INJECTION, SOLUTION INTRAVENOUS at 01:05

## 2020-05-06 RX ADMIN — FENTANYL CITRATE 50 MCG: 50 INJECTION INTRAMUSCULAR; INTRAVENOUS at 12:05

## 2020-05-06 RX ADMIN — OXYCODONE HYDROCHLORIDE 5 MG: 5 TABLET ORAL at 05:05

## 2020-05-06 RX ADMIN — HEPARIN SODIUM 5000 UNITS: 5000 INJECTION, SOLUTION INTRAVENOUS; SUBCUTANEOUS at 12:05

## 2020-05-06 RX ADMIN — ROCURONIUM BROMIDE 5 MG: 10 INJECTION, SOLUTION INTRAVENOUS at 03:05

## 2020-05-06 RX ADMIN — KETOROLAC TROMETHAMINE 15 MG: 15 INJECTION, SOLUTION INTRAMUSCULAR; INTRAVENOUS at 10:05

## 2020-05-06 RX ADMIN — DEXAMETHASONE SODIUM PHOSPHATE 4 MG: 4 INJECTION, SOLUTION INTRAMUSCULAR; INTRAVENOUS at 01:05

## 2020-05-06 RX ADMIN — EPHEDRINE SULFATE 5 MG: 50 INJECTION INTRAVENOUS at 02:05

## 2020-05-06 RX ADMIN — PHENYLEPHRINE HYDROCHLORIDE 100 MCG: 10 INJECTION INTRAVENOUS at 04:05

## 2020-05-06 RX ADMIN — PREGABALIN 150 MG: 150 CAPSULE ORAL at 08:05

## 2020-05-06 RX ADMIN — HEPARIN SODIUM 5000 UNITS: 5000 INJECTION INTRAVENOUS; SUBCUTANEOUS at 09:05

## 2020-05-06 RX ADMIN — ROCURONIUM BROMIDE 10 MG: 10 INJECTION, SOLUTION INTRAVENOUS at 02:05

## 2020-05-06 RX ADMIN — SODIUM CHLORIDE, SODIUM GLUCONATE, SODIUM ACETATE, POTASSIUM CHLORIDE, MAGNESIUM CHLORIDE, SODIUM PHOSPHATE, DIBASIC, AND POTASSIUM PHOSPHATE: .53; .5; .37; .037; .03; .012; .00082 INJECTION, SOLUTION INTRAVENOUS at 03:05

## 2020-05-06 RX ADMIN — Medication 20 MG: at 01:05

## 2020-05-06 RX ADMIN — METHOCARBAMOL TABLETS 1000 MG: 500 TABLET, COATED ORAL at 05:05

## 2020-05-06 RX ADMIN — PREGABALIN 150 MG: 150 CAPSULE ORAL at 11:05

## 2020-05-06 RX ADMIN — INSULIN ASPART 2 UNITS: 100 INJECTION, SOLUTION INTRAVENOUS; SUBCUTANEOUS at 11:05

## 2020-05-06 RX ADMIN — FENTANYL CITRATE 25 MCG: 50 INJECTION, SOLUTION INTRAMUSCULAR; INTRAVENOUS at 03:05

## 2020-05-06 RX ADMIN — SUGAMMADEX 200 MG: 100 INJECTION, SOLUTION INTRAVENOUS at 04:05

## 2020-05-06 RX ADMIN — SUCCINYLCHOLINE CHLORIDE 100 MG: 20 INJECTION, SOLUTION INTRAMUSCULAR; INTRAVENOUS at 01:05

## 2020-05-06 RX ADMIN — SODIUM CHLORIDE: 0.9 INJECTION, SOLUTION INTRAVENOUS at 08:05

## 2020-05-06 RX ADMIN — KETOROLAC TROMETHAMINE 15 MG: 30 INJECTION, SOLUTION INTRAMUSCULAR at 12:05

## 2020-05-06 RX ADMIN — SODIUM CHLORIDE, SODIUM GLUCONATE, SODIUM ACETATE, POTASSIUM CHLORIDE, MAGNESIUM CHLORIDE, SODIUM PHOSPHATE, DIBASIC, AND POTASSIUM PHOSPHATE: .53; .5; .37; .037; .03; .012; .00082 INJECTION, SOLUTION INTRAVENOUS at 01:05

## 2020-05-06 RX ADMIN — ACETAMINOPHEN 1000 MG: 500 TABLET ORAL at 05:05

## 2020-05-06 RX ADMIN — FAMOTIDINE 20 MG: 10 INJECTION, SOLUTION INTRAVENOUS at 01:05

## 2020-05-06 RX ADMIN — SODIUM CHLORIDE: 0.9 INJECTION, SOLUTION INTRAVENOUS at 12:05

## 2020-05-06 RX ADMIN — MIDAZOLAM HYDROCHLORIDE 2 MG: 1 INJECTION, SOLUTION INTRAMUSCULAR; INTRAVENOUS at 12:05

## 2020-05-06 RX ADMIN — CEFAZOLIN 2 G: 330 INJECTION, POWDER, FOR SOLUTION INTRAMUSCULAR; INTRAVENOUS at 01:05

## 2020-05-06 RX ADMIN — ROCURONIUM BROMIDE 20 MG: 10 INJECTION, SOLUTION INTRAVENOUS at 01:05

## 2020-05-06 RX ADMIN — PROPOFOL 150 MG: 10 INJECTION, EMULSION INTRAVENOUS at 01:05

## 2020-05-06 RX ADMIN — Medication 10 MG: at 03:05

## 2020-05-06 NOTE — ANESTHESIA PROCEDURE NOTES
Erector Spinae Plane Single Injection Block bilateral    Patient location during procedure: pre-op   Block not for primary anesthetic.  Reason for block: at surgeon's request and post-op pain management   Post-op Pain Location: pelvic pain  Start time: 5/6/2020 12:25 PM  Timeout: 5/6/2020 12:25 PM   End time: 5/6/2020 12:40 PM    Staffing  Authorizing Provider: Dami Pettit MD  Performing Provider: Adeola Armando MD    Staffing  Other anesthesia staff: Naomy Blanco MD  Preanesthetic Checklist  Completed: patient identified, site marked, surgical consent, pre-op evaluation, timeout performed, IV checked, risks and benefits discussed and monitors and equipment checked  Peripheral Block  Patient position: sitting  Prep: ChloraPrep  Patient monitoring: heart rate, cardiac monitor, continuous pulse ox, continuous capnometry and frequent blood pressure checks  Block type: erector spinae plane (Erector Spinae Plane Block)  Laterality: bilateral  Injection technique: single shot  Location: T9-10  Needle  Needle type: Stimuplex   Needle gauge: 21 G  Needle length: 4 in  Needle localization: anatomical landmarks and ultrasound guidance   -ultrasound image captured on disc.  Assessment  Injection assessment: negative aspiration, negative parasthesia and local visualized surrounding nerve  Paresthesia pain: none  Heart rate change: no  Slow fractionated injection: yes  Additional Notes  Patient tolerated well.  [facility]

## 2020-05-06 NOTE — TRANSFER OF CARE
"Anesthesia Transfer of Care Note    Patient: Zohaib Moon    Procedure(s) Performed: Procedure(s) (LRB):  XI ROBOTIC PROSTATECTOMY (N/A)  XI ROBOTIC LYMPHADENECTOMY, PELVIC    Patient location: PACU    Anesthesia Type: general    Transport from OR: Transported from OR on 6-10 L/min O2 by face mask with adequate spontaneous ventilation    Post pain: adequate analgesia    Post assessment: no apparent anesthetic complications and tolerated procedure well    Post vital signs: stable    Level of consciousness: responds to stimulation and sedated    Nausea/Vomiting: no nausea/vomiting    Complications: none    Transfer of care protocol was followed      Last vitals:   Visit Vitals  /74 (BP Location: Left arm, Patient Position: Lying)   Pulse 69   Temp 36.7 °C (98 °F) (Oral)   Resp 14   Ht 5' 10" (1.778 m)   Wt 98.9 kg (218 lb)   SpO2 100%   BMI 31.28 kg/m²     "

## 2020-05-06 NOTE — OP NOTE
5/6/2020      Procedure:   1) laparoscopic radical prostatectomy with robotic assistance  2) laparoscopic bilateral pelvic lymph node dissection    Preop diagnosis: Prostate Cancer  Postop diagnosis: Prostate Cancer, Stage T1C    Surgeon: Sammy Maier MD (present for the entire procedure)  Assistant: PAULETTE Walter MD   EBL: 300 ccs    Wound Class: 2    Specimens removed: prostate, seminal vesicles, lymph nodes, bladder neck biopsy    Drain: Medrano      PROCEDURE NOTE:  Preoperatively the H&P were updated. Also confirmed that patient had had their hibiclens shower and preop hydration. During this COVID pandemic it was felt further delay could compromise his cancer care. After general endotracheal anesthesia, the patient was carefully positioned, padded, prepped and draped.  Positioning and padding was checked by the surgeon and the circulating nurse.  IV antibiotics were administered within 1 hour prior to making initial skin incision.  An OG tube was placed.  A Medrano catheter was placed.  A timeout was called. The patient's identity was confirmed with 2 identifiers.  The correct procedure, allergies, blood products were verified by the entire operative team.                                                                      A Veress needle was placed at the supraumbilical position and the abdomen insufflated to 15 mmHg.  A 12 mm trocar was placed at this site and a 0 degree camera was introduced. The abdominal cavity was carefully inspected. There was no evidence of trauma to the peritoneal contents, nor any evidence of injury to the retroperitoneum.  All accessory trocars were then placed under direct vision.                                                                             The peritoneum posterior to the bladder was incised, the right vas deferens identified and divided.  The right seminal vesicle was gently dissected away from surrounding tissue with care being taken not to cause stretch  or thermal  injury to the lateral pedicle. A similar procedure was performed  on  the left side.  Both seminal vesicles were retracted anteriorly. Denonvilliers fascia was incised and this plane of dissection carried down to the level of the apex of the prostate.  A posterior/lateral release was performed bilaterally.                                                                                                                                            An anterior bladder drop was performed.  After dropping the bladder, a right sided pelvic lymph node dissection was completed and this was sent as permanent pathologic specimen #1.  The endopelvic fascia on the right  was gently dissected posteriorly, thus beginning the lateral release.  A left sided pelvic lymph node dissection was performed and this was sent  as permanent pathologic specimen #2.  Again, the endopelvic fascia was   gently dissected posteriorly, completing the lateral release.  A V stitch was used in a figure of eight fashion to control the dorsal venous complex. Excellent hemostasis was noted at this juncture. The plane between the bladder neck and prostate base was developed and the urethra was divided, a bladder neck sparing approach was utilized.  Excellent preservation of the internal sphincter was achieved circumferentially.     The bladder neck biopsy was sent as permanent section specimen #3.      After dividing the posterior bladder neck, the seminal vesicles and vas ampulla were revisualized and retracted anteriorly. The lateral pedicle on the right was controlled with bipolar cautery.      Cold scissors were used to athermically dissect the neurovascular bundle away  from the capsule of the prostate down to the level of the urethra, thus preserving the right neurovascular bundle.      A similar procedure was performed on the left side.    The urethra at the apex was divided preserving maximal urethral length.The rectourethralis was divided. The  specimen was placed in the right colic gutter. The pelvis was  irrigated and carefully inspected.  There was no evidence of rectal trauma and there was excellent hemostasis.  A vesicourethral anastomosis was then performed with a running suture of 3-0 Monocryl.  After completing the anastomosis, a fresh Medrano catheter was advanced into the bladder and the balloon  inflated.  The bladder was irrigated with 120  ccs twice. Once prior to tying the anastomotic suture and once after, there was noted to be no extravasation at the anastomosis. The specimen was placed in an endocatch bag.     Throughout the procedure the first assistant assisted with positioning, trocar placement, docking. She also provided assistance with exposure, visualization, traction/countertraction, suction and irrigation.     A drain .was not placed, specimen was removed from the field and port sites were closed.  Needle and sponge counts were correctThe patient was transferred to the Recovery Room in stable condition.

## 2020-05-06 NOTE — H&P
Urology ([facility]) H&P for upcoming procedure  Staff:  Jakob Maier MD    Is this patient in a research study?  No    CC: prostate adencarcinoma    HPI:  Zohaib Moon is a 65 y.o. male with [id number] [place] 3+3=6 prostate adenocarcinoma.      The patient initially presented with no symptoms.   He is continent and potent.     MRI 12/9/2019- 60 ccs, PI-RADS 4 1.1 cm anterior lesion. Negative extra prostatic extension, seminal vesicle.    Target fusion biopsy 1/21/2019 revealed the following:  Jose 6, left anterior target 4/7 28%  right apex 1/1 6%, left apex 1/1 6%,   left middle 2/2 50%,   left lateral apex 1/1 7%, left lateral mid 1/1 67%, left lateral base 1/1 8%.   Overall 11/20 positive cores.    PSA: 6.2  Volume: 60grams  Voiding complaints: absent  ED: absent  Incontinence: absent    MARILYN  3 - low risk    ROS:  Neg except per HPI, specifically no bone pain, no unintentional weight loss, no anorexia, no night sweats    Past Medical History:   Diagnosis Date    Diabetes mellitus     Diabetes mellitus, type 2     Elevated PSA     Hyperlipidemia     Hypertension        Past Surgical History:   Procedure Laterality Date    APPENDECTOMY      LIPOMA RESECTION  2018    TRUS Bx  01/15/2020    VASECTOMY         Social History     Socioeconomic History    Marital status:      Spouse name: Shilpa    Number of children: 2    Years of education: 16    Highest education level: Bachelor's degree (e.g., BA, AB, BS)   Occupational History    Not on file   Social Needs    Financial resource strain: Patient refused    Food insecurity:     Worry: Patient refused     Inability: Patient refused    Transportation needs:     Medical: Patient refused     Non-medical: Patient refused   Tobacco Use    Smoking status: Never Smoker    Smokeless tobacco: Never Used   Substance and Sexual Activity    Alcohol use: Yes     Alcohol/week: 8.0 standard drinks     Types: 8 Shots of liquor per week     Comment: 3-4  "drinks a week    Drug use: Never    Sexual activity: Yes     Partners: Female   Lifestyle    Physical activity:     Days per week: 2 days     Minutes per session: 20 min    Stress: Only a little   Relationships    Social connections:     Talks on phone: Patient refused     Gets together: Patient refused     Attends Mosque service: Patient refused     Active member of club or organization: Patient refused     Attends meetings of clubs or organizations: Patient refused     Relationship status: Patient refused   Other Topics Concern    Not on file   Social History Narrative    Not on file       Family History   Problem Relation Age of Onset    Heart disease Father     Kidney disease Father     Diabetes Father     Heart disease Mother        Review of patient's allergies indicates:  No Known Allergies    No current facility-administered medications on file prior to encounter.      Current Outpatient Medications on File Prior to Encounter   Medication Sig Dispense Refill    aspirin (ECOTRIN) 81 MG EC tablet Take 81 mg by mouth once daily.      atorvastatin (LIPITOR) 20 MG tablet   See Instructions, TAKE 1 TABLET BY MOUTH EVERY DAY, # 90 tab(s), 3 Refill(s), Pharmacy: [facility]acy #[id number]      carvediloL (COREG) 6.25 MG tablet   See Instructions, TAKE 1 TABLET BY MOUTH TWICE A DAY, # 180 tab(s), 3 Refill(s), Pharmacy: [facility]/pharmacy #[id number]      lisinopril (PRINIVIL,ZESTRIL) 20 MG tablet   See Instructions, TAKE 1 TABLET BY MOUTH EVERY DAY, # 90 tab(s), 3 Refill(s), Pharmacy: [facility]/pharmacy #[id number]      meloxicam (MOBIC) 7.5 MG tablet Take 7.5 mg by mouth once daily.       metFORMIN (GLUCOPHAGE) 500 MG tablet   See Instructions, TAKE 1 TABLET BY MOUTH TWICE A DAY, # 180 tab(s), 3 Refill(s), Pharmacy: [facility]/pharmacy #[id number]         Anticoagulation:  Yes ASA 81mg    Physical Exam:  weight 218 lb/99 kg  Height 5'10"    There is no height or weight on file to calculate BMI.    AAOx4, NAD, WDWN  NC/AT, EOMI, PER, sclerae " anicteric, speech normal, tongue midline  Nl effort, CTAB  RRR  Soft, non-tender, non-distended   Scars: None.   Prostate Deferred       Labs:    Urine dipstick today shows trace leukocytes and trace of blood.      No results found for: WBC, HGB, HCT, MCV, PLT    Pending results for A1c, CMP and CBC. Drawn today 3/18/20    BMP  Lab Results   Component Value Date     03/18/2020    K 5.1 03/18/2020     03/18/2020    CO2 30 (H) 03/18/2020    BUN 27 (H) 03/18/2020    CREATININE 1.0 03/18/2020    CALCIUM 10.2 03/18/2020    ANIONGAP 7 (L) 03/18/2020    ESTGFRAFRICA >60.0 03/18/2020    EGFRNONAA >60.0 03/18/2020       No results found for: PSA, PSADIAG, PSATOTAL, PSAFREE, PSAFREEPCT    Imaging:    mpMRI prostate (3/12/2020) 60 ccs, PI-RADS 4 1.1 cm anterior lesion. Negative extra prostatic extension, seminal vesicle.      Assessment: Zohaib Moon is a 65 y.o. male with [id number] [place] 3+3=6 prostate adenocarcinoma.      Plan:   1. To OR today for RALP with BPLND  2. Consents signed   3. I have explained the risk, benefits, and alternatives of the procedure in detail. The patient voices understanding and all questions have been answered. The patient agrees to proceed as planned.   4. Cleared by Dr. Becerra for surgery without any additional work-up 3/18/2020. Patient is optimized.       Hunter Salvador MD

## 2020-05-07 VITALS
RESPIRATION RATE: 18 BRPM | HEART RATE: 80 BPM | TEMPERATURE: 98 F | WEIGHT: 218 LBS | BODY MASS INDEX: 31.21 KG/M2 | HEIGHT: 70 IN | OXYGEN SATURATION: 95 % | SYSTOLIC BLOOD PRESSURE: 121 MMHG | DIASTOLIC BLOOD PRESSURE: 79 MMHG

## 2020-05-07 PROCEDURE — 94761 N-INVAS EAR/PLS OXIMETRY MLT: CPT

## 2020-05-07 PROCEDURE — 63600175 PHARM REV CODE 636 W HCPCS: Performed by: STUDENT IN AN ORGANIZED HEALTH CARE EDUCATION/TRAINING PROGRAM

## 2020-05-07 PROCEDURE — 25000003 PHARM REV CODE 250: Performed by: STUDENT IN AN ORGANIZED HEALTH CARE EDUCATION/TRAINING PROGRAM

## 2020-05-07 RX ORDER — OXYCODONE HYDROCHLORIDE 5 MG/1
5 TABLET ORAL EVERY 4 HOURS PRN
Qty: 10 TABLET | Refills: 0 | Status: SHIPPED | OUTPATIENT
Start: 2020-05-07 | End: 2020-05-13 | Stop reason: ALTCHOICE

## 2020-05-07 RX ORDER — SULFAMETHOXAZOLE AND TRIMETHOPRIM 400; 80 MG/1; MG/1
1 TABLET ORAL DAILY
Qty: 7 TABLET | Refills: 0 | Status: SHIPPED | OUTPATIENT
Start: 2020-05-07 | End: 2020-05-14

## 2020-05-07 RX ORDER — POLYETHYLENE GLYCOL 3350 17 G/17G
17 POWDER, FOR SOLUTION ORAL DAILY
Qty: 30 PACKET | Refills: 0 | Status: SHIPPED | OUTPATIENT
Start: 2020-05-07 | End: 2020-07-16

## 2020-05-07 RX ORDER — OXYBUTYNIN CHLORIDE 5 MG/1
5 TABLET ORAL 3 TIMES DAILY PRN
Qty: 21 TABLET | Refills: 0 | Status: SHIPPED | OUTPATIENT
Start: 2020-05-07 | End: 2020-05-13 | Stop reason: ALTCHOICE

## 2020-05-07 RX ADMIN — ASPIRIN 81 MG: 81 TABLET, COATED ORAL at 09:05

## 2020-05-07 RX ADMIN — METHOCARBAMOL TABLETS 1000 MG: 500 TABLET, COATED ORAL at 11:05

## 2020-05-07 RX ADMIN — METHOCARBAMOL TABLETS 1000 MG: 500 TABLET, COATED ORAL at 04:05

## 2020-05-07 RX ADMIN — LISINOPRIL 20 MG: 20 TABLET ORAL at 09:05

## 2020-05-07 RX ADMIN — PANTOPRAZOLE SODIUM 40 MG: 40 TABLET, DELAYED RELEASE ORAL at 09:05

## 2020-05-07 RX ADMIN — OXYCODONE HYDROCHLORIDE 5 MG: 5 TABLET ORAL at 01:05

## 2020-05-07 RX ADMIN — ATORVASTATIN CALCIUM 20 MG: 20 TABLET, FILM COATED ORAL at 09:05

## 2020-05-07 RX ADMIN — ACETAMINOPHEN 1000 MG: 500 TABLET ORAL at 05:05

## 2020-05-07 RX ADMIN — HEPARIN SODIUM 5000 UNITS: 5000 INJECTION INTRAVENOUS; SUBCUTANEOUS at 05:05

## 2020-05-07 RX ADMIN — ACETAMINOPHEN 1000 MG: 500 TABLET ORAL at 12:05

## 2020-05-07 RX ADMIN — POLYETHYLENE GLYCOL 3350 17 G: 17 POWDER, FOR SOLUTION ORAL at 09:05

## 2020-05-07 RX ADMIN — SODIUM CHLORIDE: 0.9 INJECTION, SOLUTION INTRAVENOUS at 12:05

## 2020-05-07 RX ADMIN — CARVEDILOL 6.25 MG: 6.25 TABLET, FILM COATED ORAL at 09:05

## 2020-05-07 NOTE — ASSESSMENT & PLAN NOTE
-IVF Hep lock  -PO pain meds  -Regular diet  -Home with Medrano today  -DVT ppx: SCDs, TEDs, subQ heparin q8  -Possible ANGELITO Cr  Home after drain dispo

## 2020-05-07 NOTE — ANESTHESIA POSTPROCEDURE EVALUATION
Anesthesia Post Evaluation    Patient: Zohaib Moon    Procedure(s) Performed: Procedure(s) (LRB):  XI ROBOTIC PROSTATECTOMY (N/A)  XI ROBOTIC LYMPHADENECTOMY, PELVIC    Final Anesthesia Type: general    Patient location during evaluation: PACU  Patient participation: Yes- Able to Participate  Level of consciousness: awake and alert  Post-procedure vital signs: reviewed and stable  Pain management: adequate  Airway patency: patent    PONV status at discharge: No PONV  Anesthetic complications: no      Cardiovascular status: blood pressure returned to baseline  Respiratory status: unassisted  Hydration status: euvolemic  Follow-up not needed.          Vitals Value Taken Time   /54 5/7/2020  6:55 AM   Temp 36.7 °C (98 °F) 5/7/2020  5:49 AM   Pulse 82 5/7/2020  6:55 AM   Resp 14 5/7/2020  6:55 AM   SpO2 96 % 5/7/2020  6:55 AM   Vitals shown include unvalidated device data.      Event Time     Out of Recovery 17:00:00          Pain/Sosa Score: Pain Rating Prior to Med Admin: 4 (5/7/2020  5:12 AM)  Sosa Score: 10 (5/6/2020  6:00 PM)

## 2020-05-07 NOTE — NURSING
Pt d/c home per MD order. Patient teaching done on ANGELITO drain.Patient discharged home with ANGELITO drain and rubin catheter in place. Written instructions and supplies given to patient to take home. VSS. Pain and discomfort well controlled. Pt left via wheelchair escorted by staff.

## 2020-05-07 NOTE — NURSING
Pt was able to teach back to me rubin catheter care. Pt given written instructions and supplies to take home.

## 2020-05-07 NOTE — PROGRESS NOTES
[facility]  Urology  Progress Note    Patient Name: Zohaib Moon  MRN: [id number]  Admission Date: 5/6/2020  Hospital Length of Stay: 1 days  Code Status: Full Code   Attending Provider: Ti Maier MD   Primary Care Physician: Yaritza Levy MD    Subjective:     HPI:  No notes on file    Interval History:   No acute events overnight  Tolerated diet  Ambulated  Pain well controlled  ANGELITO output slowing    Review of Systems  Objective:     Temp:  [96.3 °F (35.7 °C)-98.2 °F (36.8 °C)] 98 °F (36.7 °C)  Pulse:  [61-90] 80  Resp:  [11-20] 18  SpO2:  [94 %-100 %] 95 %  BP: (111-144)/(54-93) 130/80     Body mass index is 31.28 kg/m².           Drains     Drain                 Closed/Suction Drain 05/06/20 1636 Left Abdomen less than 1 day         Urethral Catheter 05/06/20 1327 Latex 18 Fr. less than 1 day                Physical Exam   Nursing note and vitals reviewed.  Constitutional: He is oriented to person, place, and time. He appears well-developed and well-nourished. No distress.   Cardiovascular: Normal rate.    Pulmonary/Chest: Effort normal. No accessory muscle usage. No respiratory distress.   Abdominal: Soft. He exhibits no distension. There is no tenderness.   Port site incisions CDI  ANGELITO with SS output   Genitourinary:   Genitourinary Comments: Medrano with clear yellow urine   Musculoskeletal: He exhibits no edema.   Neurological: He is alert and oriented to person, place, and time.       Significant Labs:    BMP:  No results for input(s): NA, K, CL, CO2, BUN, CREATININE, LABGLOM, GLUCOSE, CALCIUM in the last 168 hours.    CBC:   No results for input(s): WBC, HGB, HCT, PLT in the last 168 hours.    Urine Culture: No results for input(s): LABURIN in the last 168 hours.  Urine Studies: No results for input(s): COLORU, APPEARANCEUA, PHUR, SPECGRAV, PROTEINUA, GLUCUA, KETONESU, BILIRUBINUA, OCCULTUA, NITRITE, UROBILINOGEN, LEUKOCYTESUR, RBCUA, WBCUA, BACTERIA, SQUAMEPITHEL, HYALINECASTS  in the last 168 hours.    Invalid input(s): VIJAYA  All pertinent labs results from the past 24 hours have been reviewed.    Significant Imaging:  All pertinent imaging results/findings from the past 24 hours have been reviewed.                  Assessment/Plan:     Prostate cancer  -IVF Hep lock  -PO pain meds  -Regular diet  -Home with Medrano today  -DVT ppx: SCDs, TEDs, subQ heparin q8  -Possible ANGELITO Cr  Home after drain dispo          VTE Risk Mitigation (From admission, onward)         Ordered     heparin (porcine) injection 5,000 Units  Every 8 hours      05/06/20 1609     IP VTE HIGH RISK PATIENT  Once      05/06/20 1609     Place sequential compression device  Until discontinued      05/06/20 1609     Place LASHELL hose  Until discontinued      05/06/20 1609                Hunter Salvador MD  Urology  [facility]

## 2020-05-07 NOTE — PROGRESS NOTES
Report called to Bebe NUNES,pt made ready for transport to [facility] via stretcher per transporter, resting quietly,VSS,shelby po water and meds, laps x4/incision x1 with Dermabond dry and intact, rubin draining cloudy pink urine to gravity, wife updated via telephone, sent with black duffel bag and pt belongings bag, stable at present

## 2020-05-07 NOTE — PLAN OF CARE
05/07/20 0921   Post-Acute Status   Post-Acute Authorization Other   Other Status No Post-Acute Service Needs       No SW needs noted upon D/C.  SW in contact with CM and Medical staff. Will continue to follow and offer support as needed.     Nilson Tavarez, JESSEE  [facility]   Ext. [phone]

## 2020-05-07 NOTE — NURSING
Pt arrived to the 9th floor via stretcher @ 1930 and placed into room . Pt orientated to the room, white board updated, call light within reach and instructed on how to use it, bed in lowest position, side rails up x2. IS and hibiclens @ bedside. SCD applied and turned on. Adair hose applied and on. VS taken and stable, IVF continued.  Medrano and ANGELITO drain intact and draining appropriate.

## 2020-05-07 NOTE — SUBJECTIVE & OBJECTIVE
Interval History:   No acute events overnight  Tolerated diet  Ambulated  Pain well controlled  ANGELITO output slowing    Review of Systems  Objective:     Temp:  [96.3 °F (35.7 °C)-98.2 °F (36.8 °C)] 98 °F (36.7 °C)  Pulse:  [61-90] 80  Resp:  [11-20] 18  SpO2:  [94 %-100 %] 95 %  BP: (111-144)/(54-93) 130/80     Body mass index is 31.28 kg/m².           Drains     Drain                 Closed/Suction Drain 05/06/20 1636 Left Abdomen less than 1 day         Urethral Catheter 05/06/20 1327 Latex 18 Fr. less than 1 day                Physical Exam   Nursing note and vitals reviewed.  Constitutional: He is oriented to person, place, and time. He appears well-developed and well-nourished. No distress.   Cardiovascular: Normal rate.    Pulmonary/Chest: Effort normal. No accessory muscle usage. No respiratory distress.   Abdominal: Soft. He exhibits no distension. There is no tenderness.   Port site incisions CDI  ANGELITO with SS output   Genitourinary:   Genitourinary Comments: Medrano with clear yellow urine   Musculoskeletal: He exhibits no edema.   Neurological: He is alert and oriented to person, place, and time.       Significant Labs:    BMP:  No results for input(s): NA, K, CL, CO2, BUN, CREATININE, LABGLOM, GLUCOSE, CALCIUM in the last 168 hours.    CBC:   No results for input(s): WBC, HGB, HCT, PLT in the last 168 hours.    Urine Culture: No results for input(s): LABURIN in the last 168 hours.  Urine Studies: No results for input(s): COLORU, APPEARANCEUA, PHUR, SPECGRAV, PROTEINUA, GLUCUA, KETONESU, BILIRUBINUA, OCCULTUA, NITRITE, UROBILINOGEN, LEUKOCYTESUR, RBCUA, WBCUA, BACTERIA, SQUAMEPITHEL, HYALINECASTS in the last 168 hours.    Invalid input(s): WRIGHTSUR  All pertinent labs results from the past 24 hours have been reviewed.    Significant Imaging:  All pertinent imaging results/findings from the past 24 hours have been reviewed.

## 2020-05-08 ENCOUNTER — TELEPHONE (OUTPATIENT)
Dept: UROLOGY | Facility: HOSPITAL | Age: 65
End: 2020-05-08

## 2020-05-08 ENCOUNTER — NURSE TRIAGE (OUTPATIENT)
Dept: ADMINISTRATIVE | Facility: CLINIC | Age: 65
End: 2020-05-08

## 2020-05-08 RX ORDER — ONDANSETRON 4 MG/1
4 TABLET, FILM COATED ORAL EVERY 6 HOURS PRN
Qty: 30 TABLET | Refills: 1 | Status: SHIPPED | OUTPATIENT
Start: 2020-05-08 | End: 2020-05-13 | Stop reason: ALTCHOICE

## 2020-05-08 NOTE — DISCHARGE SUMMARY
[facility]  Urology  Discharge Summary      Patient Name: Zohaib Moon  MRN: [id number]  Admission Date: 5/6/2020  Hospital Length of Stay: 1 days  Discharge Date and Time: 5/7/2020  1:53 PM  Attending Physician: Ti Maier MD  Discharging Provider: Hunter Salvador MD  Primary Care Physician: Yaritza Levy MD    HPI:      Zohaib Moon is a 65 y.o. male with [id number] Jose 3+3=6 prostate adenocarcinoma.       The patient initially presented with no symptoms.   He is continent and potent.      MRI 12/9/2019- 60 ccs, PI-RADS 4 1.1 cm anterior lesion. Negative extra prostatic extension, seminal vesicle.     Target fusion biopsy 1/21/2019 revealed the following:  Venetie 6, left anterior target 4/7 28%  right apex 1/1 6%, left apex 1/1 6%,   left middle 2/2 50%,   left lateral apex 1/1 7%, left lateral mid 1/1 67%, left lateral base 1/1 8%.   Overall 11/20 positive cores.     PSA: 6.2  Volume: 60grams  Voiding complaints: absent  ED: absent  Incontinence: absent     MARILYN  3 - low risk    Procedure(s) (LRB):  XI ROBOTIC PROSTATECTOMY (N/A)  XI ROBOTIC LYMPHADENECTOMY, PELVIC     Indwelling Lines/Drains at time of discharge:   Lines/Drains/Airways     Drain                 Closed/Suction Drain 05/06/20 1636 Left Abdomen 1 day         Urethral Catheter 05/06/20 1327 Latex 18 Fr. 1 day                Hospital Course     Patient underwent the above described procedures (robotic assisted laparoscopic prostatectomy and pelvic lymphadenectomy) and tolerated the surgery well. He ambulated on POD 0 and tolerated a regular diet. He was stable for discharge on POD 1 ambulating in the halls, tolerating regular diet, with good urine output and pain well controlled on oral pain medications. He was discharged home with oral antibiotics, oral pain medications, and stool softeners. He will follow up with Dr. Maier in 1 week for rubin catheter removal and possibly ANGELITO drain removal.    Consults:  none    Significant Diagnostic Studies: none    Pending Diagnostic Studies:     Procedure Component Value Units Date/Time    Specimen to Pathology, Surgery Urology [[id number]] Collected:  05/06/20 1604    Order Status:  Sent Lab Status:  In process Updated:  05/07/20 1037          Final Active Diagnoses:    Diagnosis Date Noted POA    PRINCIPAL PROBLEM:  Prostate cancer [C61] 02/11/2020 Yes      Problems Resolved During this Admission:       Discharged Condition: good    Disposition: Home or Self Care    Follow Up:  Follow-up Information     Ti Maier MD In 1 week.    Specialty:  Urology  Why:  For Medrano catheter removal  Contact information:  [address]  [facility] [address]  [phone]                 Patient Instructions:      CBC Without Differential   Standing Status: Future Number of Occurrences: 1 Standing Exp. Date: 05/05/21     Comprehensive metabolic panel   Standing Status: Future Number of Occurrences: 1 Standing Exp. Date: 05/05/21     Hemoglobin A1c   Standing Status: Future Number of Occurrences: 1 Standing Exp. Date: 05/05/21     Lifting restrictions     Notify your health care provider if you experience any of the following:  temperature >100.4     Notify your health care provider if you experience any of the following:  persistent nausea and vomiting or diarrhea     Notify your health care provider if you experience any of the following:  severe uncontrolled pain     Notify your health care provider if you experience any of the following:  redness, tenderness, or signs of infection (pain, swelling, redness, odor or green/yellow discharge around incision site)     Notify your health care provider if you experience any of the following:  severe persistent headache     Notify your health care provider if you experience any of the following:  difficulty breathing or increased cough     Notify your health care provider if you experience any of the following:  worsening rash     Notify  your health care provider if you experience any of the following:  persistent dizziness, light-headedness, or visual disturbances     Notify your health care provider if you experience any of the following:  increased confusion or weakness     EKG 12-lead   Standing Status: Future Number of Occurrences: 1 Standing Exp. Date: 03/06/21     Order Specific Question Answer Comments   Diagnosis Hypertension [964876]      Type And Screen Preop   Standing Status: Future Number of Occurrences: 1 Standing Exp. Date: 05/05/21     Activity as tolerated     Weight bearing restrictions (specify):     Medications:  Reconciled Home Medications:      Medication List      START taking these medications    oxybutynin 5 MG Tab  Commonly known as:  DITROPAN  Take 1 tablet (5 mg total) by mouth 3 (three) times daily as needed (bladder spasms).     oxyCODONE 5 MG immediate release tablet  Commonly known as:  ROXICODONE  Take 1 tablet (5 mg total) by mouth every 4 (four) hours as needed for Pain.     polyethylene glycol 17 gram Pwpk  Commonly known as:  GLYCOLAX  Take 17 g by mouth once daily.     sulfamethoxazole-trimethoprim 400-80mg 400-80 mg per tablet  Commonly known as:  BACTRIM,SEPTRA  Take 1 tablet by mouth once daily. for 7 days        CONTINUE taking these medications    aspirin 81 MG EC tablet  Commonly known as:  ECOTRIN  Take 81 mg by mouth once daily.     atorvastatin 20 MG tablet  Commonly known as:  LIPITOR  See Instructions, TAKE 1 TABLET BY MOUTH EVERY DAY, # 90 tab(s), 3 Refill(s), Pharmacy: [facility]/pharmacy #[id number]     carvediloL 6.25 MG tablet  Commonly known as:  COREG  See Instructions, TAKE 1 TABLET BY MOUTH TWICE A DAY, # 180 tab(s), 3 Refill(s), Pharmacy: [facility]/pharmacy #[id number]     lisinopriL 20 MG tablet  Commonly known as:  PRINIVIL,ZESTRIL  See Instructions, TAKE 1 TABLET BY MOUTH EVERY DAY, # 90 tab(s), 3 Refill(s), Pharmacy: [facility]/pharmacy #[id number]     meloxicam 7.5 MG tablet  Commonly known as:  MOBIC  Take 7.5 mg by mouth  once daily.     metFORMIN 500 MG tablet  Commonly known as:  GLUCOPHAGE  See Instructions, TAKE 1 TABLET BY MOUTH TWICE A DAY, # 180 tab(s), 3 Refill(s), Pharmacy: [facility]/pharmacy #[id number]     ONE DAILY MULTIVITAMIN per tablet  Generic drug:  multivitamin  Take 1 tablet by mouth every evening.            Time spent on the discharge of patient: 25 minutes    Hunter Salvador MD  Urology  [facility]

## 2020-05-09 NOTE — TELEPHONE ENCOUNTER
Called by wife that patient has been nauseated today and not eating much. No vomiting. Not passing flatus or having BM's. Afebrile and no chills. Rx for Zofran sent to pharmacy.    LINDY Corbett MD  Urology, PGY-2  Pager: [phone]

## 2020-05-09 NOTE — TELEPHONE ENCOUNTER
Came home yesterday from hospital he had prostatomy   Now has nausea and low grade temp  Not eating and taking oxycodone and advil  Has 99.2    Reason for Disposition   Caller has NON-URGENT question and triager unable to answer question    Additional Information   Negative: Sounds like a life-threatening emergency to the triager   Negative: Chest pain   Negative: Difficulty breathing   Negative: Surgical incision symptoms and questions   Negative: [1] Discomfort (pain, burning or stinging) when passing urine AND [2] male   Negative: [1] Discomfort (pain, burning or stinging) when passing urine AND [2] female   Negative: Constipation   Negative: Calf pain   Negative: Symptoms arising from use of a urinary catheter (Medrano or Coude)   Negative: Dizziness is severe, or persists > 24 hours after surgery   Negative: Pain, redness, swelling, or pus at IV Site   Negative: Cast problems or questions   Negative: Medication question   Negative: [1] Widespread rash AND [2] bright red, sunburn-like   Negative: [1] Drinking very little AND [2] dehydration suspected (e.g., no urine > 12 hours, very dry mouth, very lightheaded)   Negative: [1] Vomiting AND [2] abdomen looks much more swollen than usual   Negative: [1] Vomiting AND [2] persists > 4 hours   Negative: [1] SEVERE headache AND [2] after spinal (epidural) anesthesia   Negative: Patient sounds very sick or weak to the triager   Negative: Sounds like a serious complication to the triager   Negative: Fever > 100.5 F (38.1 C)   Negative: [1] SEVERE post-op pain (e.g., excruciating, pain scale 8-10) AND [2] not controlled with pain medications   Negative: Caller has URGENT question and triager unable to answer question   Negative: [1] Headache AND [2] after spinal (epidural) anesthesia AND [3] not severe   Negative: [1] Daily fever > 99.5 F (37.5 C) AND [2] persists > 3 days   Negative: [1] MILD-MODERATE post-op pain (e.g., pain scale 1-7) AND [2]  "not controlled with pain medications    Answer Assessment - Initial Assessment Questions  1. SYMPTOM: "What's the main symptom you're concerned about?" (e.g., pain, fever, vomiting)   fever  2. ONSET: "When did _____fever___  Start?"  This mateo  3. SURGERY: "What surgery was performed?" prostactectomy      4. DATE of SURGERY: "When was surgery performed?" 5/6/2020       5. ANESTHESIA: " What type of anesthesia did you have?" (e.g., general, spinal, epidural, local)    general  6. PAIN: "Is there any pain?" If so, ask: "How bad is it?"  (Scale 1-10; or mild, moderate, severe)     5  7. FEVER: "Do you have a fever?" If so, ask: "What is your temperature, how was it measured, and when did it start?"    this evening  8. VOMITING: none  9. BLEEDING: "Is there any bleeding?" If so, ask: "How much?" and "Where?"       hiral valenzuela drain and had 2 ounces at 4:35 bloody drainage   10. OTHER SYMPTOMS: "Do you have any other symptoms?" (e.g., drainage from wound, painful urination, constipation)        some nausea    Protocols used: ST POST-OP SYMPTOMS AND [id number]      "

## 2020-05-12 ENCOUNTER — NURSE TRIAGE (OUTPATIENT)
Dept: ADMINISTRATIVE | Facility: CLINIC | Age: 65
End: 2020-05-12

## 2020-05-12 NOTE — TELEPHONE ENCOUNTER
"Patient had prostate removed approximately 1 week ago and is suppose to have an appointment in office on tmw. (nothing noted in chart)Patient C/O Upper right thigh occasional "nerve paint" per patient. Occurring once or twice every couple of hours throughout the day with activity. Slight tingling sensation to thigh, does not extend to leg or feet. Feels more localized near surgery site, ongoing approx 3 days,  Tylenol and rest improves.     Urology on call contacted carol -  Gómez Corbett tylenol and or ibuprofen as needed for pain per MD. MD plans to message clinic to follow up, and patient should hear back in the am. Will notify patient and message provider.       Reason for Disposition   Major surgery in the past two months    Additional Information   Negative: Looks like a broken bone or dislocated joint (e.g., crooked or deformed)   Negative: Sounds like a life-threatening emergency to the triager   Negative: Followed a hip injury   Negative: Followed a knee injury   Negative: Followed an ankle or foot injury   Negative: Back pain radiating (shooting) into leg(s)   Negative: Foot pain is the main symptom   Negative: Ankle pain is the main symptom   Negative: Knee pain is the main symptom   Negative: Leg swelling is the main symptom   Negative: Chest pain   Negative: Difficulty breathing   Negative: Entire foot is cool or blue in comparison to other side   Negative: Unable to walk   Negative: Fever and red area (or area very tender to touch)   Negative: Fever and swollen joint   Negative: Thigh or calf pain in only one leg and present > 1 hour   Negative: Thigh, calf, or ankle swelling in only one leg   Negative: Thigh, calf, or ankle swelling in both legs, but one side is definitely more swollen   Negative: History of prior 'blood clot' in leg or lungs (i.e., deep vein thrombosis, pulmonary embolism)   Negative: History of inherited increased risk of blood clots (e.g., factor 5 Leiden, " antithrombin 3, protein C or protein S deficiency, prothrombin mutation)   Negative: Recent illness requiring prolonged bed rest (immobilization)   Negative: Hip or leg fracture in past 2 months (e.g., or had cast on leg or ankle)    Protocols used: LEG PAIN-A-OH

## 2020-05-13 ENCOUNTER — OFFICE VISIT (OUTPATIENT)
Dept: UROLOGY | Facility: CLINIC | Age: 65
End: 2020-05-13
Payer: COMMERCIAL

## 2020-05-13 DIAGNOSIS — Z90.79 HISTORY OF ROBOT-ASSISTED LAPAROSCOPIC RADICAL PROSTATECTOMY: ICD-10-CM

## 2020-05-13 DIAGNOSIS — C61 PROSTATE CANCER: ICD-10-CM

## 2020-05-13 DIAGNOSIS — Z98.890 POST-OPERATIVE STATE: Primary | ICD-10-CM

## 2020-05-13 DIAGNOSIS — N52.31 ERECTILE DYSFUNCTION FOLLOWING RADICAL PROSTATECTOMY: ICD-10-CM

## 2020-05-13 DIAGNOSIS — Z46.6 ENCOUNTER FOR FOLEY CATHETER REMOVAL: ICD-10-CM

## 2020-05-13 PROCEDURE — 99999 PR PBB SHADOW E&M-EST. PATIENT-LVL III: CPT | Mod: PBBFAC,,, | Performed by: NURSE PRACTITIONER

## 2020-05-13 PROCEDURE — 99024 PR POST-OP FOLLOW-UP VISIT: ICD-10-PCS | Mod: S$GLB,,, | Performed by: NURSE PRACTITIONER

## 2020-05-13 PROCEDURE — 99999 PR PBB SHADOW E&M-EST. PATIENT-LVL III: ICD-10-PCS | Mod: PBBFAC,,, | Performed by: NURSE PRACTITIONER

## 2020-05-13 PROCEDURE — 99024 POSTOP FOLLOW-UP VISIT: CPT | Mod: S$GLB,,, | Performed by: NURSE PRACTITIONER

## 2020-05-13 NOTE — PROGRESS NOTES
Subjective:       Patient ID: Zohaib Moon is a 65 y.o. male.    Chief Complaint: Post-op Evaluation    Zohaib Moon is a 65 y.o. Male with Prostate Cancer; s/p RALP on 05/06/2020 with Dr. Maier.  Switzer 7 (3+4); (-)EPE, SV with (+) margins at right apical and left anterior; pT2, pN0, pMx.    He is here today for one week follow up visit.  -rubin removal  -ANGELITO drain removal.    He is reports minimal drainage from ANGELITO.  Some discomfort from rubin.  Tenderness to incisions but they closed.  Reports some discomfort/occasional numbness to upper right thigh.      No issues with n/v  No constipation.      Abdominal incisions closed and healing well                      Past Medical History:  No date: Cancer  No date: Diabetes mellitus  No date: Diabetes mellitus, type 2  No date: Elevated PSA  No date: Hyperlipidemia  No date: Hypertension    Past Surgical History:  No date: APPENDECTOMY  2018: LIPOMA RESECTION  5/6/2020: ROBOT-ASSISTED LAPAROSCOPIC PELVIC LYMPHADENECTOMY USING DA   AISHA XI      Comment:  Procedure: XI ROBOTIC LYMPHADENECTOMY, PELVIC;  Surgeon:               Ti Maier MD;  Location: [facility] OR [address] [place] [address];                 Service: Urology;;  5/6/2020: ROBOT-ASSISTED LAPAROSCOPIC PROSTATECTOMY USING DA AISHA   XI; N/A      Comment:  Procedure: XI ROBOTIC PROSTATECTOMY;  Surgeon: Ti Maier MD;  Location: [facility] OR [address] [place] [address];  Service:                Urology;  Laterality: N/A;  3hrs gen with regional  01/15/2020: TRUS Bx  No date: VASECTOMY    Review of patient's family history indicates:  Problem: Heart disease      Relation: Father          Age of Onset: (Not Specified)  Problem: Kidney disease      Relation: Father          Age of Onset: (Not Specified)  Problem: Diabetes      Relation: Father          Age of Onset: (Not Specified)  Problem: Heart disease      Relation: Mother          Age of Onset: (Not Specified)      Social History    Socioeconomic History      Marital status:        Spouse name: Shilpa      Number of children: 2      Years of education: 16      Highest education level: Bachelor's degree (e.g., BA, AB, BS)    Occupational History      Not on file    Social Needs      Financial resource strain: Patient refused      Food insecurity:        Worry: Patient refused        Inability: Patient refused      Transportation needs:        Medical: Patient refused        Non-medical: Patient refused    Tobacco Use      Smoking status: Never Smoker      Smokeless tobacco: Never Used    Substance and Sexual Activity      Alcohol use: Yes        Alcohol/week: 8.0 standard drinks        Types: 8 Shots of liquor per week        Comment: 3-4 drinks a week      Drug use: Never      Sexual activity: Yes        Partners: Female    Lifestyle      Physical activity:        Days per week: 2 days        Minutes per session: 20 min      Stress: Only a little    Relationships      Social connections:        Talks on phone: Patient refused        Gets together: Patient refused        Attends Sabianism service: Patient refused        Active member of club or organization: Patient refused        Attends meetings of clubs or organizations: Patient refused        Relationship status: Patient refused    Other Topics      Concerns:        Not on file    Social History Narrative      Not on file      Allergies:  Patient has no known allergies.    Medications:  Current Outpatient Medications:   aspirin (ECOTRIN) 81 MG EC tablet, Take 81 mg by mouth once daily., Disp: , Rfl:   atorvastatin (LIPITOR) 20 MG tablet,  See Instructions, TAKE 1 TABLET BY MOUTH EVERY DAY, # 90 tab(s), 3 Refill(s), Pharmacy: [facility]/pharmacy #[id number], Disp: , Rfl:   carvediloL (COREG) 6.25 MG tablet,  See Instructions, TAKE 1 TABLET BY MOUTH TWICE A DAY, # 180 tab(s), 3 Refill(s), Pharmacy: [facility]/pharmacy #[id number], Disp: , Rfl:   lisinopril (PRINIVIL,ZESTRIL) 20 MG tablet,  See Instructions, TAKE 1 TABLET BY MOUTH EVERY DAY, # 90 tab(s),  3 Refill(s), Pharmacy: [facility]/pharmacy #[id number], Disp: , Rfl:   meloxicam (MOBIC) 7.5 MG tablet, Take 7.5 mg by mouth once daily. , Disp: , Rfl:   metFORMIN (GLUCOPHAGE) 500 MG tablet,  See Instructions, TAKE 1 TABLET BY MOUTH TWICE A DAY, # 180 tab(s), 3 Refill(s), [id number], Disp: , Rfl:   multivitamin (ONE DAILY MULTIVITAMIN) per tablet, Take 1 tablet by mouth every evening., Disp: , Rfl:   ondansetron (ZOFRAN) 4 MG tablet, Take 1 tablet (4 mg total) by mouth every 6 (six) hours as needed for Nausea., Disp: 30 tablet, Rfl: 1  oxybutynin (DITROPAN) 5 MG Tab, Take 1 tablet (5 mg total) by mouth 3 (three) times daily as needed (bladder spasms)., Disp: 21 tablet, Rfl: 0  oxyCODONE (ROXICODONE) 5 MG immediate release tablet, Take 1 tablet (5 mg total) by mouth every 4 (four) hours as needed for Pain., Disp: 10 tablet, Rfl: 0  polyethylene glycol (GLYCOLAX) 17 gram PwPk, Take 17 g by mouth once daily., Disp: 30 packet, Rfl: 0  sulfamethoxazole-trimethoprim 400-80mg (BACTRIM,SEPTRA) 400-80 mg per tablet, Take 1 tablet by mouth once daily. for 7 days, Disp: 7 tablet, Rfl: 0      Review of Systems    Objective:      Physical Exam    Assessment:       1. Post-operative state    2. Prostate cancer    3. History of robot-assisted laparoscopic radical prostatectomy    4. Erectile dysfunction following radical prostatectomy    5. Encounter for Medrano catheter removal        Plan:         RTC one month with PSA and Consult with Dr. Gallo for penile rehab    Patient was informed of the post operative pathology report. Answered all his questions. We discussed his post op expectations. This month his focus is on Kegel exercises as instructed by the nurse. Informed only 4 times at day at 10 reps. No more, no less. This is a muscle that has to exercised but also rest to get stronger.    Also emphasized the importance of his activity restrictions. No heavy lifting. Nothing greater than 15 lbs or greater than a  gallon of milk. He has had a major surgery and his body needs to heal on inside as well as the outside. Daily walks as well as heart healthy low fat diets were encouraged.     We discussed his daily care of his surgical  Wound sites. Discussed avoiding certain movements and stretches that put strain on the sites. He voiced understanding.    We will reevaluate his bladder control at his next visit. We will also discuss penile rehabilitation to help with his recover and return of his erections. Both of these issues vary on response with each patient. But not to worry he is in healing process, and we will handle each issue as they come along. He voices understanding.    This next visit we will get his first PSA. Informed that we will set up a schedule of regular visits and PSA checks. These visits are at one month, then at 4 months, then every 6 months until 5 years after initial surgery.     After 5 years, the visits and PSA checks will be yearly up to 10 years post operatively. Informed these exams are important because we are monitoring for the possibility of reoccurance. The prostate and cancer was removed, but there is a chance that a microscopic cell was left behind. The surgeon can only remove what he can see. Monitoring the PSA as well as ILAN's helps to discover this early if it happens.  Voices understanding. Call us with any questions. We will see him back in 1 month or PRN with any concerns.

## 2020-05-14 ENCOUNTER — PATIENT MESSAGE (OUTPATIENT)
Dept: ADMINISTRATIVE | Facility: OTHER | Age: 65
End: 2020-05-14

## 2020-05-18 ENCOUNTER — PATIENT MESSAGE (OUTPATIENT)
Dept: UROLOGY | Facility: CLINIC | Age: 65
End: 2020-05-18

## 2020-06-23 ENCOUNTER — LAB VISIT (OUTPATIENT)
Dept: LAB | Facility: HOSPITAL | Age: 65
End: 2020-06-23
Attending: NURSE PRACTITIONER
Payer: COMMERCIAL

## 2020-06-23 ENCOUNTER — OFFICE VISIT (OUTPATIENT)
Dept: UROLOGY | Facility: CLINIC | Age: 65
End: 2020-06-23
Payer: COMMERCIAL

## 2020-06-23 VITALS — WEIGHT: 220 LBS | BODY MASS INDEX: 31.57 KG/M2

## 2020-06-23 DIAGNOSIS — Z90.79 HISTORY OF ROBOT-ASSISTED LAPAROSCOPIC RADICAL PROSTATECTOMY: ICD-10-CM

## 2020-06-23 DIAGNOSIS — N52.31 ERECTILE DYSFUNCTION FOLLOWING RADICAL PROSTATECTOMY: ICD-10-CM

## 2020-06-23 DIAGNOSIS — Z46.6 ENCOUNTER FOR FOLEY CATHETER REMOVAL: ICD-10-CM

## 2020-06-23 DIAGNOSIS — Z98.890 POST-OPERATIVE STATE: ICD-10-CM

## 2020-06-23 DIAGNOSIS — C61 PROSTATE CANCER: Primary | ICD-10-CM

## 2020-06-23 DIAGNOSIS — C61 PROSTATE CANCER: ICD-10-CM

## 2020-06-23 LAB — COMPLEXED PSA SERPL-MCNC: 0.03 NG/ML (ref 0–4)

## 2020-06-23 PROCEDURE — 99499 NO LOS: ICD-10-PCS | Mod: S$GLB,,, | Performed by: UROLOGY

## 2020-06-23 PROCEDURE — 99499 UNLISTED E&M SERVICE: CPT | Mod: S$GLB,,, | Performed by: UROLOGY

## 2020-06-23 PROCEDURE — 84153 ASSAY OF PSA TOTAL: CPT

## 2020-06-23 PROCEDURE — 36415 COLL VENOUS BLD VENIPUNCTURE: CPT

## 2020-06-23 PROCEDURE — 99999 PR PBB SHADOW E&M-EST. PATIENT-LVL II: ICD-10-PCS | Mod: PBBFAC,,, | Performed by: UROLOGY

## 2020-06-23 PROCEDURE — 99999 PR PBB SHADOW E&M-EST. PATIENT-LVL II: CPT | Mod: PBBFAC,,, | Performed by: UROLOGY

## 2020-06-23 NOTE — LETTER
June 23, 2020        Solo Hernandez III, MD  [address]  [place] [address]             [facility]  [address] [address]  Phone: [phone]   Patient: Zohaib Moon   MR Number: [id number]   YOB: 1955   Date of Visit: 6/23/2020       Dear Dr. Hernandez:    Thank you for referring Zohaib Moon to me for evaluation. Attached you will find relevant portions of my assessment and plan of care.    If you have questions, please do not hesitate to call me. I look forward to following Zohaib Moon along with you.    Sincerely,      Ti Maier MD            CC  No Recipients    Enclosure

## 2020-06-23 NOTE — PROGRESS NOTES
Clinic Note  6/23/2020      Subjective:         Chief Complaint:   HPI  Zohaib Moon is a 65 y.o. male recently prostate cancer on January 28th. Was told he had intermediate risk tumor.  Consult from Dr. Solo Hernandez.  Type 2 diabetes. Negative family history. Semi-retired from restaurant business, . 4 grandkids.  Continent and potent preop.  Still leaks, 5-7 ppd. Not doing Kegels.  Diabetic but does not check glucose.     T1C  PSA 6.2  MRI 12/9/2019- 60 ccs, PI-RADS 4 1.1 cm anterior lesion. Negative extra prostatic extension, seminal vesicle.  Biopsy-1/21/2019- Jose 6, left anterior target 4/7 28%, right apex 1/1 6%, left apex 1/1 6%, left middle 2/2 50%, left lateral apex 1/1 7%, left lateral mid 1/1 67%, left lateral base 1/1 8%. Overall 11/20 positive cores.  MARILYN score- 3 low risk  NCCN-low risk  robotic assisted laparoscopic prostatectomy 5/6/2020- [place] 3+4 [id number].  PSA pending.    No results found for: PSA, PSADIAG, PSATOTAL, PSAFREE, PSAFREEPCT   Past Medical History:   Diagnosis Date    Cancer     Diabetes mellitus     Diabetes mellitus, type 2     Elevated PSA     Hyperlipidemia     Hypertension      Family History   Problem Relation Age of Onset    Heart disease Father     Kidney disease Father     Diabetes Father     Heart disease Mother      Social History     Socioeconomic History    Marital status:      Spouse name: Shilpa    Number of children: 2    Years of education: 16    Highest education level: Bachelor's degree (e.g., BA, AB, BS)   Occupational History    Not on file   Social Needs    Financial resource strain: Patient refused    Food insecurity     Worry: Patient refused     Inability: Patient refused    Transportation needs     Medical: Patient refused     Non-medical: Patient refused   Tobacco Use    Smoking status: Never Smoker    Smokeless tobacco: Never Used   Substance and Sexual Activity    Alcohol use: Yes     Alcohol/week: 8.0  "standard drinks     Types: 8 Shots of liquor per week     Comment: 3-4 drinks a week    Drug use: Never    Sexual activity: Yes     Partners: Female   Lifestyle    Physical activity     Days per week: 2 days     Minutes per session: 20 min    Stress: Only a little   Relationships    Social connections     Talks on phone: Patient refused     Gets together: Patient refused     Attends [employer]ism service: Patient refused     Active member of club or organization: Patient refused     Attends meetings of clubs or organizations: Patient refused     Relationship status: Patient refused   Other Topics Concern    Not on file   Social History Narrative    Not on file     Past Surgical History:   Procedure Laterality Date    APPENDECTOMY      LIPOMA RESECTION  2018    ROBOT-ASSISTED LAPAROSCOPIC PELVIC LYMPHADENECTOMY USING DA AISHA XI  5/6/2020    Procedure: XI ROBOTIC LYMPHADENECTOMY, PELVIC;  Surgeon: Ti Maier MD;  Location: [facility] OR [address] [place] [address];  Service: Urology;;    ROBOT-ASSISTED LAPAROSCOPIC PROSTATECTOMY USING DA AISHA XI N/A 5/6/2020    Procedure: XI ROBOTIC PROSTATECTOMY;  Surgeon: Ti Maier MD;  Location: [facility] OR [address] [place] [address];  Service: Urology;  Laterality: N/A;  3hrs gen with regional      TRUS Bx  01/15/2020    VASECTOMY       Patient Active Problem List   Diagnosis    Prostate cancer    Hypertension    Type 2 diabetes mellitus    Hypercholesterolemia    Hearing loss    History of coronary angiogram    Class 1 obesity with body mass index (BMI) of 31.0 to 31.9 in adult    Snoring    Family history of heart disease    Sinus bradycardia    Total bilirubin, elevated     Review of Systems      Objective:      Wt 99.8 kg (220 lb)   BMI 31.57 kg/m²   Estimated body mass index is 31.57 kg/m² as calculated from the following:    Height as of 5/6/20: 5' 10" (1.778 m).    Weight as of this encounter: 99.8 kg (220 lb).  Physical Exam      Assessment and Plan:           Problem List Items " Addressed This Visit     None          Follow up:   Mateo Baez appointment  6 months with PSA with Dr. Solo Hernandez  Recommend he restart glucose checks.    Ti Maier

## 2020-07-02 ENCOUNTER — CLINICAL SUPPORT (OUTPATIENT)
Dept: REHABILITATION | Facility: HOSPITAL | Age: 65
End: 2020-07-02
Attending: UROLOGY
Payer: COMMERCIAL

## 2020-07-02 DIAGNOSIS — C61 PROSTATE CANCER: ICD-10-CM

## 2020-07-02 PROCEDURE — 97162 PT EVAL MOD COMPLEX 30 MIN: CPT

## 2020-07-02 PROCEDURE — 97110 THERAPEUTIC EXERCISES: CPT

## 2020-07-02 NOTE — PATIENT INSTRUCTIONS
Home Program: 2020    Kegels in lying or sittin. Sit or lie down comfortably with legs and buttocks relaxed.  2. Squeeze and LIFT the muscles that stop the flow of urine and passage of gas.  Keep legs and buttock muscles quiet.  3. Hold lift for 10 seconds without holding breath.  4. Release and DROP for 10 seconds.  5. Repeat 10 times, 2 sets, 2 times per day.      No Kegels while urinating!      Abdominal sets:   1. In same position, draw in your lower belly as if zipping tight pants.  2. Hold for 5 sec without holding breath.  3. Release for 10 sec.  4. Repeat 10 times, 1 set, twice per day.

## 2020-07-02 NOTE — PROGRESS NOTES
[facility]  Pelvic Health Physical Therapy Initial Evaluation    Date: 7/2/2020   Name: Zohaib Moon  Clinic Number: [id number]  Therapy Diagnosis:   Encounter Diagnosis   Name Primary?    Prostate cancer      Physician: Ti Maier MD    Physician Orders: PT Eval and Treat    Medical Diagnosis from Referral: prostate CA  Evaluation Date: 7/2/2020  Authorization Period Expiration: 12/31/2020  Plan of Care Expiration: 10/2/2020  Visit # / Visits authorized: 1/ 20    Time In: 12:00  Time Out: 12:55  Total Appointment Time (timed & untimed codes): 55 minutes    Precautions: universal    Subjective     Date of onset: 5/6/2020    History of current condition - Zohaib reports: that he is using 3-4 pads per day.  He drips quite often.  He notes nocturia x every hour at night, now every 2 hours.  Wakes to urge.  Having some pain with sitting but this is improving. He is walking for exercise.  Some Kegels.  He is not quite dry at night yet.      Bladder/Bowel History: nocturia x 2 prior to surgery   Frequency of urination:   Daytime: hourly; some JIC'ing           Nighttime: every 2 hours   Difficulty initiating urine stream: No   Urine stream: strong   Complete emptying: Yes   Bladder leakage: Yes   Frequency of incidents: daily   Amount leaked (urine): teaspoon(s)   Urinary Urgency: No   Frequency of bowel movements: 1-2 times a day   Difficulty initiating BM: No   Quality/Shape of BM: formed stools   Does Patient Feel Empty after BM? Yes   Fiber Supplements or Laxative Use? No   Colon leakage: No   Form of protection: brief; pad on the inside  Number of pads required in 24 hours: 3-4 pad changes    Pain:  Location: perineum   Current 0/10, worst 5/10, best 0/10   Description: Aching  Aggravating Factors/Activities that cause symptoms: driving, hard chairs   Easing Factors: rest     Medical History: Zohaib  has a past medical history of Cancer, Diabetes mellitus, Diabetes mellitus,  type 2, Elevated PSA, Hyperlipidemia, and Hypertension.     Surgical History: Zohaib Moon  has a past surgical history that includes TRUS Bx (01/15/2020); Appendectomy; Vasectomy; Lipoma resection (2018); Robot-assisted laparoscopic prostatectomy using da Merlyn Xi (N/A, 5/6/2020); and Robot-assisted laparoscopic pelvic lymphadenectomy using da Merlyn Xi (5/6/2020).    Medications: Zohaib has a current medication list which includes the following prescription(s): aspirin, atorvastatin, carvedilol, lisinopril, meloxicam, metformin, multivitamin, and polyethylene glycol.    Allergies: Review of patient's allergies indicates:  No Known Allergies     Prior Therapy/Previous treatment included: none  Social History:  lives with their spouse    Current exercise: walking   Occupation: Pt works as a  semi-retired  Prior Level of Function: continent  Current Level of Function: leakage during sleep and ADL's.      Types of fluid intake: water and coffee (2 cups); no carbonation; occ OJ; social EtOH  Diet: diabetic   Habitus: well developed, well nourished  Abuse/Neglect: No     Pts goals: to be able to perform ADL's without leakage; to sleep better without leakage    OBJECTIVE     See EMR under MEDIA for written consent provided 7/2/2020  Chaperone: declined    ORTHO SCREEN  Posture in sitting: slouched   Posture in standing: WNL  Pelvic alignment: no sign of deviations noted in supine     ABDOMINAL WALL ASSESSMENT    Abdominal strength: Rectus abdominus: 3-/5     Transverse abdominus: poorly isolated but palpable contraction noted  Scarring: appy scar adhering to tissues but not painful to mobilize; portal scars healing with no signs of infection  Diastasis: 2 finger bulge noted with abdominal contraction       RECTAL PELVIC FLOOR EXAM    EXTERNAL ASSESSMENT  Anus: WNL  Skin condition: WNL   Scarring: none  Sensation: WNL   Pain: none  Voluntary contraction: visible lift  Voluntary relaxation: visible  drop  Involuntary contraction: visible lift  Bearing down: bulge  Anal [place]: intact  Discharge: none       INTERNAL ASSESSMENT  EAS tone: WNL   Impaction: none   Pain: none  Sensation: able to localize pressure appropriately   Muscle Bulk: WFL   Muscle Power: 2+/5  Muscle Endurance: 10 sec      Quality of contraction: slow relaxation   Specificity: WNL  Coordination: tends to hold breath during PFM contraction     TREATMENT     Treatment Time In: 12:45  Treatment Time Out: 12:55  Total Treatment time (time-based codes) separate from Evaluation: 10 minutes      Therapeutic Exercise to develop strength and endurance for 10 minutes including:  Kegels Endurance Holds and abdominal sets    Patient Education provided:   general anatomy/physiology of urinary/ bowel  system, benefits of treatment, risks of treatment and alternative methods of treatment were discussed with the pt. Additionally, bladder irritants and posture/body mechanices were reviewed.     Home Exercises provided:  Written Home Exercises provided: yes.  Exercises were reviewed and Zohaib was able to demonstrate them prior to the end of the session.    Zohaib demonstrated good  understanding of the education provided.     See EMR under Patient Instructions for exercises provided 7/2/2020.    Assessment     Zohaib is a 65 y.o. male referred to outpatient Physical Therapy with a medical diagnosis of prostate CA. Pt presents with poor trunk stability, decreased pelvic muscle strength, decreased endurance of the pelvic muscles and poor coordination of pelvic floor muscles during ADL's leading to urinary or fecal leakage.       Pt prognosis is Excellent.   Pt will benefit from skilled outpatient Physical Therapy to address the deficits stated above and in the chart below, provide pt/family education, and to maximize pt's level of independence.     Plan of care discussed with patient: Yes  Pt's spiritual, cultural and educational needs considered and patient is  agreeable to the plan of care and goals as stated below:     Anticipated Barriers for therapy: none    Medical Necessity is demonstrated by the following:    History  Co-morbidities and personal factors that may impact the plan of care Co-morbidities   Prostate CA s/p RALP    Personal Factors  no deficits     moderate   Examination  Body structures and functions, activity limitations and participation restrictions that may impact the plan of care Body Regions/Systems/Functions:  decreased pelvic muscle strength, decreased endurance of the pelvic muscles and poor coordination of pelvic floor muscles during ADL's leading to urinary or fecal leakage     Activity Limitations:  delaying urge to urinate, sleep uninterrupted by excessive nocturia and incontinence with ADLs    Participation Restrictions:  all ADLs/iADLs uninterrupted by urinary incontinence/urgency/frequency and Sleep restrictions    Activity limitations:   Learning and applying knowledge  no deficits    General Tasks and Commands  no deficits    Communication  no deficits    Mobility  no deficits    Self care  no deficits    Domestic Life  no deficits    Interactions/Relationships  no deficits    Life Areas  no deficits    Community and Social Life  no deficits       moderate   Clinical Presentation evolving clinical presentation with changing clinical characteristics moderate   Decision Making/ Complexity Score: moderate       Goals:  Long Term Goals: 12 weeks   Pt will report improved ability to perform ADLs (ie. dressing, bathing, functional transfers) with little (drops) to no urinary leakage 7/7 days per week.  Pt will report improved ability to perform iADLs (ie. driving, home chores, grocery shopping) with little (drops) to no urinary leakage 7/7 days per week.   Pt will report improved ability to perform advanced iADLs (ie. gardening, yard work, heavy lifting, moving furniture) with little (drops) to no urinary leakage 7/7 days per week.   Pt will  report improved ability to cough/sneeze/laugh/yell with little (drops) to no urinary leakage 7/7 days per week.   Pt will report improved ability to sleep uninterrupted by excessive nocturia 5/7 days per week.   Pt will report a decrease in pad use to 1 pads per day.  Pt/family will be independent with HEP for continued self-management of symptoms.    Plan     Plan of care Certification: 7/2/2020 to 10/2/2020.    Outpatient Physical Therapy 1 times per 2 week(s)  for 12 weeks to include the following interventions: therapeutic exercises, neuromuscular re-education, manual therapy, patient/family education and self care/home management    Sasha Vicente, PT, BCB-PMD

## 2020-07-13 ENCOUNTER — TELEPHONE (OUTPATIENT)
Dept: UROLOGY | Facility: CLINIC | Age: 65
End: 2020-07-13

## 2020-07-16 ENCOUNTER — CLINICAL SUPPORT (OUTPATIENT)
Dept: REHABILITATION | Facility: HOSPITAL | Age: 65
End: 2020-07-16
Attending: FAMILY MEDICINE
Payer: COMMERCIAL

## 2020-07-16 ENCOUNTER — OFFICE VISIT (OUTPATIENT)
Dept: UROLOGY | Facility: CLINIC | Age: 65
End: 2020-07-16
Payer: COMMERCIAL

## 2020-07-16 VITALS
BODY MASS INDEX: 30.96 KG/M2 | SYSTOLIC BLOOD PRESSURE: 147 MMHG | WEIGHT: 216.25 LBS | HEIGHT: 70 IN | HEART RATE: 65 BPM | DIASTOLIC BLOOD PRESSURE: 95 MMHG

## 2020-07-16 DIAGNOSIS — Z98.890 POST-OPERATIVE STATE: ICD-10-CM

## 2020-07-16 DIAGNOSIS — N52.31 ERECTILE DYSFUNCTION FOLLOWING RADICAL PROSTATECTOMY: ICD-10-CM

## 2020-07-16 DIAGNOSIS — Z90.79 HISTORY OF ROBOT-ASSISTED LAPAROSCOPIC RADICAL PROSTATECTOMY: ICD-10-CM

## 2020-07-16 DIAGNOSIS — C61 PROSTATE CANCER: Primary | ICD-10-CM

## 2020-07-16 DIAGNOSIS — C61 PROSTATE CANCER: ICD-10-CM

## 2020-07-16 DIAGNOSIS — Z46.6 ENCOUNTER FOR FOLEY CATHETER REMOVAL: ICD-10-CM

## 2020-07-16 PROCEDURE — 99024 PR POST-OP FOLLOW-UP VISIT: ICD-10-PCS | Mod: S$GLB,,, | Performed by: UROLOGY

## 2020-07-16 PROCEDURE — 99999 PR PBB SHADOW E&M-EST. PATIENT-LVL IV: ICD-10-PCS | Mod: PBBFAC,,, | Performed by: UROLOGY

## 2020-07-16 PROCEDURE — 99024 POSTOP FOLLOW-UP VISIT: CPT | Mod: S$GLB,,, | Performed by: UROLOGY

## 2020-07-16 PROCEDURE — 99999 PR PBB SHADOW E&M-EST. PATIENT-LVL IV: CPT | Mod: PBBFAC,,, | Performed by: UROLOGY

## 2020-07-16 PROCEDURE — 97110 THERAPEUTIC EXERCISES: CPT

## 2020-07-16 RX ORDER — FLASH GLUCOSE SENSOR
KIT MISCELLANEOUS
COMMUNITY
Start: 2020-06-25 | End: 2022-10-05

## 2020-07-16 RX ORDER — TADALAFIL 5 MG/1
TABLET ORAL
Qty: 30 TABLET | Refills: 11 | Status: SHIPPED | OUTPATIENT
Start: 2020-07-16 | End: 2020-10-19

## 2020-07-16 NOTE — PROGRESS NOTES
Pelvic Health Physical Therapy   Treatment Note     Name: Zohaib Moon  Clinic Number: [id number]    Therapy Diagnosis:   Encounter Diagnosis   Name Primary?    Prostate cancer Yes     Physician: Ti Maier MD    Visit Date: 7/16/2020    Physician Orders: PT eval and treat  Medical Diagnosis: prostate CA  Evaluation Date: 7/2/2020  Authorization Period Expiration: 12/31/2020  Plan of Care Certification Period: 10/2/2020  Visit #/Visits authorized: 2/ 30   Cancelled Visits: 0  No Show Visits: 0    Time In: 11:52  Time Out: 12:39  Total Billable Time: 40 minutes    Precautions: Standard    Subjective     Pt reports: Nocturia x 2-3 which is an improvement.  .  He was compliant with home exercise program.  Response to previous treatment: 2 pads per day, decreased nocturia  Functional change: see above    Pain: 0/10    Objective     Zohaib received therapeutic exercises to develop  strength and endurance for 40 minutes including: Kegels Endurance Holds, Kegels: Quick flicks  and abdominal sets  We worked in supine and in standing with external lead wires.  He demonstrated normal baseline resting, low WR rise, and fair holding in 10 sec Kegels.  His derecruitment was complete and timely.  In TA sets his holding ability was good with fair pelvic floor overflow.  In standing Kegels he demonstrated low WR rise and fair holding, good derecruitment.  He required no cues to refrain from breath holding at any point.  Quick flicks were introduced today as well.      Home Exercises Provided and Patient Education Provided     Education provided:   - posture/body mechanices  Discussed progression of plan of care with patient; educated pt in activity modification; reviewed HEP with pt. Pt demonstrated and verbalized understanding of all instruction and was provided with a handout of HEP (see Patient Instructions).    Written Home Exercises Provided: yes.  Exercises were reviewed and Zohaib was able to demonstrate them  prior to the end of the session.  Zohaib demonstrated good  understanding of the education provided.     See EMR under Patient Instructions for exercises provided 7/16/2020.    Assessment     Pt performing well and is slowly demonstrating improved urinary control and less nocturia.      Zohaib is progressing well towards his goals.   Pt prognosis is Excellent.     Pt will continue to benefit from skilled outpatient physical therapy to address the deficits listed in the problem list box on initial evaluation, provide pt/family education and to maximize pt's level of independence in the home and community environment.     Pt's spiritual, cultural and educational needs considered and pt agreeable to plan of care and goals.     Anticipated barriers to physical therapy: none    Goals: Pt will report improved ability to perform ADLs (ie. dressing, bathing, functional transfers) with little (drops) to no urinary leakage 7/7 days per week.  Pt will report improved ability to perform iADLs (ie. driving, home chores, grocery shopping) with little (drops) to no urinary leakage 7/7 days per week.   Pt will report improved ability to perform advanced iADLs (ie. gardening, yard work, heavy lifting, moving furniture) with little (drops) to no urinary leakage 7/7 days per week.   Pt will report improved ability to cough/sneeze/laugh/yell with little (drops) to no urinary leakage 7/7 days per week.   Pt will report improved ability to sleep uninterrupted by excessive nocturia 5/7 days per week.   Pt will report a decrease in pad use to 1 pads per day.  Pt/family will be independent with HEP for continued self-management of symptoms.   ALL PROGRESSING    Plan     Outpatient Physical Therapy 1 times per 2 week(s)  for 12 weeks to include the following interventions: therapeutic exercises, neuromuscular re-education, manual therapy, patient/family education and self care/home management    Sasha Vicente, PT, BCB-PMD

## 2020-07-16 NOTE — PATIENT INSTRUCTIONS
"Home Program: 2020    Kegels in standin. Stand comfortably with legs and buttocks relaxed.  2. Squeeze and LIFT the muscles that stop the flow of urine and passage of gas.  Keep legs and buttock muscles quiet.  3. Hold lift for 10 seconds without holding breath.  4. Release and DROP for 10 seconds.  5. Repeat 10 times, 2 sets, 2 times per day.      No Kegels while urinating!      Abdominal sets:   1. In same position, draw in your lower belly as if zipping tight pants.  2. Hold for 5 sec without holding breath.  3. Release for 10 sec.  4. Repeat 10 times, 1 set, twice per day.      ADD:  "Quick Flicks"- 10 one second contractions in a row with full release in between.  2-3 sets of 10 per day.      "

## 2020-07-16 NOTE — PROGRESS NOTES
Mr. Moon is a 65 y.o. male presenting for a consultation at the request of Dr. Maier. Patient presents with ED.    PRESENTING ILLNESS:    Zohaib Moon is a 65 y.o. male with prostate cancer s/p RALP on 5/6/20 by Dr. Maier.  Since then, he c/o ED.  Prior to surgery, he did not have ED.    He does have RUIZ.  He is wearing 2 pads/day.  No hematuria.  No dysuria.  Good FOS.    No bone pain or weight loss.    REVIEW OF SYSTEMS:    Zohaib Moon denies headache, blurred vision, fever, nausea, vomiting, chills, abdominal pain, chest pain, sore throat, bleeding per rectum, cough, SOB, recent loss of consciousness, recent mental status changes, seizures, dizziness, or upper or lower extremity weakness.    ELSA  1. 1  2. 0  3. 0  4. 0  5. 0      PATIENT HISTORY:    Past Medical History:   Diagnosis Date    Cancer     Diabetes mellitus     Diabetes mellitus, type 2     Elevated PSA     Hyperlipidemia     Hypertension        Family History   Problem Relation Age of Onset    Heart disease Father     Kidney disease Father     Diabetes Father     Heart disease Mother        Past Surgical History:   Procedure Laterality Date    APPENDECTOMY      LIPOMA RESECTION  2018    ROBOT-ASSISTED LAPAROSCOPIC PELVIC LYMPHADENECTOMY USING DA AISHA XI  5/6/2020    Procedure: XI ROBOTIC LYMPHADENECTOMY, PELVIC;  Surgeon: Ti Maier MD;  Location: [facility] OR [address] [place] [address];  Service: Urology;;    ROBOT-ASSISTED LAPAROSCOPIC PROSTATECTOMY USING DA AISHA XI N/A 5/6/2020    Procedure: XI ROBOTIC PROSTATECTOMY;  Surgeon: Ti Maier MD;  Location: [facility] OR [address] [place] [address];  Service: Urology;  Laterality: N/A;  3hrs gen with regional      TRUS Bx  01/15/2020    VASECTOMY         Social History     Socioeconomic History    Marital status:      Spouse name: Shilpa    Number of children: 2    Years of education: 16    Highest education level: Bachelor's degree (e.g., BA, AB, BS)   Occupational History    Not on file   Social Needs     Financial resource strain: Patient refused    Food insecurity     Worry: Patient refused     Inability: Patient refused    Transportation needs     Medical: Patient refused     Non-medical: Patient refused   Tobacco Use    Smoking status: Never Smoker    Smokeless tobacco: Never Used   Substance and Sexual Activity    Alcohol use: Yes     Alcohol/week: 8.0 standard drinks     Types: 8 Shots of liquor per week     Comment: 3-4 drinks a week    Drug use: Never    Sexual activity: Yes     Partners: Female   Lifestyle    Physical activity     Days per week: 2 days     Minutes per session: 20 min    Stress: Only a little   Relationships    Social connections     Talks on phone: Patient refused     Gets together: Patient refused     Attends [employer] service: Patient refused     Active member of club or organization: Patient refused     Attends meetings of clubs or organizations: Patient refused     Relationship status: Patient refused   Other Topics Concern    Not on file   Social History Narrative    Not on file       Review of patient's allergies indicates:  No Known Allergies      Current Outpatient Medications:     aspirin (ECOTRIN) 81 MG EC tablet, Take 81 mg by mouth once daily., Disp: , Rfl:     atorvastatin (LIPITOR) 20 MG tablet,  See Instructions, TAKE 1 TABLET BY MOUTH EVERY DAY, # 90 tab(s), 3 Refill(s), Pharmacy: [facility]/pharmacy #[id number], Disp: , Rfl:     carvediloL (COREG) 6.25 MG tablet,  See Instructions, TAKE 1 TABLET BY MOUTH TWICE A DAY, # 180 tab(s), 3 Refill(s), Pharmacy: [facility]/pharmacy #[id number], Disp: , Rfl:     [employer] COLBY 14 DAY SENSOR Kit, USE AS DIRECTED, Disp: , Rfl:     lisinopril (PRINIVIL,ZESTRIL) 20 MG tablet,  See Instructions, TAKE 1 TABLET BY MOUTH EVERY DAY, # 90 tab(s), 3 Refill(s), Pharmacy: [facility]/pharmacy #[id number], Disp: , Rfl:     metFORMIN (GLUCOPHAGE) 500 MG tablet,  See Instructions, TAKE 1 TABLET BY MOUTH TWICE A DAY, # 180 tab(s), 3 Refill(s), Pharmacy: [facility]/pharmacy  #[id number], Disp: , Rfl:     multivitamin (ONE DAILY MULTIVITAMIN) per tablet, Take 1 tablet by mouth every evening., Disp: , Rfl:       PHYSICAL EXAMINATION:    The patient generally appears in good health, is appropriately interactive, and is in no apparent distress.     Eyes: anicteric sclerae, moist conjunctivae; no lid-lag; PERRLA     HENT: Atraumatic; oropharynx clear with moist mucous membranes and no mucosal ulcerations;normal hard and soft palate. No evidence of lymphadenopathy.    Neck: Trachea midline.  No thyromegaly.    Musculoskeletal: No abnormal gait.    Skin: No lesions.    Mental: Cooperative with normal affect.  Is oriented to time, place, and person.    Neuro: Grossly intact.    Chest: Normal inspiratory effort.   No accessory muscles.  No audible wheezes.  Respirations symmetric on inspiration and expiration.    Heart: Regular rhythm.      Abdomen:  Soft, non-tender. No masses or organomegaly. Bladder is not palpable. No evidence of flank discomfort. No evidence of inguinal hernia.    Genitourinary: The penis is circumcised with no evidence of plaques or induration. The urethral meatus is normal. The testes, epididymides, and cord structures are normal in size and contour bilaterally. The scrotum is normal in size and contour.    Extremities: No clubbing, cyanosis, or edema        LABS:      Lab Results   Component Value Date    PSADIAG 0.03 06/23/2020        IMPRESSION:    Encounter Diagnoses   Name Primary?    Prostate cancer     History of robot-assisted laparoscopic radical prostatectomy     Erectile dysfunction following radical prostatectomy     Encounter for Med[place] catheter removal     Post-operative state        PLAN:    1. Discussed penile rehab today.  The risks, benefits, and the evidence was discussed with him today.  We discussed different options.  He would like to try Cialis.  Side effects discussed.  A new Rx was given.  2. RTC 3 months to re-evaluate.    Copy to: Livier

## 2020-07-16 NOTE — LETTER
July 16, 2020        Ti Maier MD  [address]  [place] [address]             [facility]  [address]  [place] [address]  Phone: [phone]   Patient: Zohaib Moon   MR Number: [id number]   YOB: 1955   Date of Visit: 7/16/2020       Dear Dr. Maier:    Thank you for referring Zohaib Moon to me for evaluation. Attached you will find relevant portions of my assessment and plan of care.    If you have questions, please do not hesitate to call me. I look forward to following Zohaib Moon along with you.    Sincerely,      Larry Gallo MD            CC  No Recipients    Enclosure

## 2020-07-20 ENCOUNTER — HISTORICAL (OUTPATIENT)
Dept: LAB | Facility: HOSPITAL | Age: 65
End: 2020-07-20

## 2020-07-20 LAB
ABS NEUT (OLG): 2.94 X10(3)/MCL (ref 2.1–9.2)
ALBUMIN SERPL-MCNC: 3.7 GM/DL (ref 3.4–4.8)
ALBUMIN/GLOB SERPL: 1.2 RATIO (ref 1.1–2)
ALP SERPL-CCNC: 58 UNIT/L (ref 40–150)
ALT SERPL-CCNC: 25 UNIT/L (ref 0–55)
APPEARANCE, UA: CLEAR
AST SERPL-CCNC: 16 UNIT/L (ref 5–34)
BASOPHILS # BLD AUTO: 0.06 X10(3)/MCL (ref 0–0.2)
BASOPHILS NFR BLD AUTO: 0.9 % (ref 0–0.9)
BILIRUB SERPL-MCNC: 0.8 MG/DL (ref 0.2–1.2)
BILIRUB UR QL STRIP: NEGATIVE
BILIRUBIN DIRECT+TOT PNL SERPL-MCNC: 0.3 MG/DL (ref 0–0.5)
BILIRUBIN DIRECT+TOT PNL SERPL-MCNC: 0.5 MG/DL (ref 0–0.8)
BUN SERPL-MCNC: 23.5 MG/DL (ref 8.4–25.7)
CALCIUM SERPL-MCNC: 10 MG/DL (ref 8.8–10)
CHLORIDE SERPL-SCNC: 105 MMOL/L (ref 98–107)
CHOLEST SERPL-MCNC: 166 MG/DL
CHOLEST/HDLC SERPL: 4 {RATIO} (ref 0–5)
CO2 SERPL-SCNC: 30 MMOL/L (ref 23–31)
COLOR UR: YELLOW
CREAT SERPL-MCNC: 1.07 MG/DL (ref 0.72–1.25)
CREAT UR-MCNC: 378.5 MG/DL (ref 58–161)
EOSINOPHIL # BLD AUTO: 0.22 X10(3)/MCL (ref 0–0.9)
EOSINOPHIL NFR BLD AUTO: 3.4 % (ref 0–6.5)
ERYTHROCYTE [DISTWIDTH] IN BLOOD BY AUTOMATED COUNT: 12.7 % (ref 11.5–17)
EST. AVERAGE GLUCOSE BLD GHB EST-MCNC: 159.9 MG/DL
GLOBULIN SER-MCNC: 3 GM/DL (ref 2.4–3.5)
GLUCOSE (UA): NEGATIVE
GLUCOSE SERPL-MCNC: 169 MG/DL (ref 82–115)
HBA1C MFR BLD: 7.2 %
HCT VFR BLD AUTO: 44.3 % (ref 42–52)
HDLC SERPL-MCNC: 39 MG/DL (ref 40–60)
HGB BLD-MCNC: 14.5 GM/DL (ref 14–18)
HGB UR QL STRIP: NEGATIVE
IMM GRANULOCYTES # BLD AUTO: 0.02 10*3/UL (ref 0–0.02)
IMM GRANULOCYTES NFR BLD AUTO: 0.3 % (ref 0–0.43)
KETONES UR QL STRIP: NEGATIVE
LDLC SERPL CALC-MCNC: 102 MG/DL (ref 50–140)
LEUKOCYTE ESTERASE UR QL STRIP: NEGATIVE
LYMPHOCYTES # BLD AUTO: 2.49 X10(3)/MCL (ref 0.6–4.6)
LYMPHOCYTES NFR BLD AUTO: 39 % (ref 16.2–38.3)
MCH RBC QN AUTO: 28.8 PG (ref 27–31)
MCHC RBC AUTO-ENTMCNC: 32.7 GM/DL (ref 33–36)
MCV RBC AUTO: 88.1 FL (ref 80–94)
MICROALBUMIN UR-MCNC: 12.6 UG/ML
MICROALBUMIN/CREAT RATIO PNL UR: 3.3 MG/GM CR (ref 0–30)
MONOCYTES # BLD AUTO: 0.66 X10(3)/MCL (ref 0.1–1.3)
MONOCYTES NFR BLD AUTO: 10.3 % (ref 4.7–11.3)
NEUTROPHILS # BLD AUTO: 2.94 X10(3)/MCL (ref 2.1–9.2)
NEUTROPHILS NFR BLD AUTO: 46.1 % (ref 49.1–73.4)
NITRITE UR QL STRIP: NEGATIVE
NRBC BLD AUTO-RTO: 0 % (ref 0–0.2)
PH UR STRIP: 5.5 [PH] (ref 5–7)
PLATELET # BLD AUTO: 284 X10(3)/MCL (ref 130–400)
PMV BLD AUTO: 9.1 FL (ref 7.4–10.4)
POTASSIUM SERPL-SCNC: 5 MMOL/L (ref 3.5–5.1)
PROT SERPL-MCNC: 6.7 GM/DL (ref 5.8–7.6)
PROT UR QL STRIP: NEGATIVE
PSA SERPL-MCNC: 0.1 NG/ML
RBC # BLD AUTO: 5.03 X10(6)/MCL (ref 4.7–6.1)
SODIUM SERPL-SCNC: 144 MMOL/L (ref 136–145)
SP GR UR STRIP: >=1.03 (ref 1–1.03)
TRIGL SERPL-MCNC: 125 MG/DL (ref 0–150)
TSH SERPL-ACNC: 0.75 UIU/ML (ref 0.35–4.94)
UROBILINOGEN UR STRIP-ACNC: NEGATIVE
VLDLC SERPL CALC-MCNC: 25 MG/DL
WBC # SPEC AUTO: 6.4 X10(3)/MCL (ref 4.5–11.5)

## 2020-08-13 ENCOUNTER — CLINICAL SUPPORT (OUTPATIENT)
Dept: REHABILITATION | Facility: HOSPITAL | Age: 65
End: 2020-08-13
Attending: UROLOGY
Payer: COMMERCIAL

## 2020-08-13 DIAGNOSIS — C61 PROSTATE CANCER: Primary | ICD-10-CM

## 2020-08-13 PROCEDURE — 97110 THERAPEUTIC EXERCISES: CPT

## 2020-08-13 NOTE — PATIENT INSTRUCTIONS
"Home Program: 2020    Kegels in standin. Stand comfortably with legs and buttocks relaxed.  2. Squeeze and LIFT the muscles that stop the flow of urine and passage of gas.  Keep legs and buttock muscles quiet.  3. Hold lift for 10 seconds without holding breath.  4. Release and DROP for 10 seconds.  5. Repeat 10 times, 2 sets, 2 times per day.      No Kegels while urinating!      Abdominal sets:   1. In same position, draw in your lower belly as if zipping tight pants.  2. Hold for 5 sec without holding breath.  3. Release for 10 sec.  4. Repeat 10 times, 1 set, twice per day.      "Quick Flicks"- 10 one second contractions in a row with full release in between.  2-3 sets of 10 per day.      STARTING TODAY:  "KEGEL WITH A PURPOSE".  Sequence is: squeeze, hold and rise to standing, then let go.  Then squeeze again, lower to sitting while holding, then let go.  Do this 5 times in each direction for total of 10.      "

## 2020-08-13 NOTE — PROGRESS NOTES
Pelvic Health Physical Therapy   Treatment Note     Name: Zohaib Moon  Clinic Number: [id number]    Therapy Diagnosis:   Encounter Diagnosis   Name Primary?    Prostate cancer Yes     Physician: Ti Maier MD    Visit Date: 8/13/2020    Physician Orders: PT eval and treat  Medical Diagnosis: prostate CA  Evaluation Date: 7/2/2020  Authorization Period Expiration: 12/31/2020  Plan of Care Certification Period: 10/2/2020  Visit #/Visits authorized: 3/ 30   Cancelled Visits: 0  No Show Visits: 0    Time In: 10:08  Time Out: 10:48  Total Billable Time: 40 minutes    Precautions: Standard    Subjective     Pt reports: Nocturia x 2 which is an improvement.  1 pad per day; dry at night. Pain has dissipated.  Still leaking with standing from prolonged sitting.    He was compliant with home exercise program.  Response to previous treatment: 1 pad per day, decreased nocturia  Functional change: see above    Pain: 0/10    Objective     Zohaib received therapeutic exercises to develop strength and endurance for 40 minutes including: 10 sec Kegels in supine and standing, and quick flicks.  We worked in supine and in standing with external lead wires.  He demonstrated normal baseline resting, fair/good WR rise, and good holding in 10 sec Kegels.  His derecruitment was complete and timely.   In standing Kegels he demonstrated improved WR rise and holding, good derecruitment.  He required no cues to refrain from breath holding at any point.  Quick flicks were performed today as well with no leakage.  We also reviewed sit<->stand with Kegel.      Home Exercises Provided and Patient Education Provided     Education provided:   - posture/body mechanices  Discussed progression of plan of care with patient; educated pt in activity modification; reviewed HEP with pt. Pt demonstrated and verbalized understanding of all instruction and was provided with a handout of HEP (see Patient Instructions).    Written Home Exercises  Provided: yes.  Exercises were reviewed and Zohaib was able to demonstrate them prior to the end of the session.  Zohaib demonstrated good  understanding of the education provided.     See EMR under Patient Instructions for exercises provided 7/16/2020.    Assessment     Pt performing well with improved urinary control and increased muscle SEMG activity.  Next session may be last.      Zohaib is progressing well towards his goals.     Pt prognosis is Excellent.     Pt will continue to benefit from skilled outpatient physical therapy to address the deficits listed in the problem list box on initial evaluation, provide pt/family education and to maximize pt's level of independence in the home and community environment.     Pt's spiritual, cultural and educational needs considered and pt agreeable to plan of care and goals.     Anticipated barriers to physical therapy: none    Goals: Pt will report improved ability to perform ADLs (ie. dressing, bathing, functional transfers) with little (drops) to no urinary leakage 7/7 days per week.  Pt will report improved ability to perform iADLs (ie. driving, home chores, grocery shopping) with little (drops) to no urinary leakage 7/7 days per week.   Pt will report improved ability to perform advanced iADLs (ie. gardening, yard work, heavy lifting, moving furniture) with little (drops) to no urinary leakage 7/7 days per week.   Pt will report improved ability to cough/sneeze/laugh/yell with little (drops) to no urinary leakage 7/7 days per week.   Pt will report improved ability to sleep uninterrupted by excessive nocturia 5/7 days per week.   Pt will report a decrease in pad use to 1 pads per day.  Pt/family will be independent with [facility] for continued self-management of symptoms.   ALL PROGRESSING    Plan     Outpatient Physical Therapy 1 times per 2 week(s)  for 12 weeks to include the following interventions: therapeutic exercises, neuromuscular re-education, manual therapy,  patient/family education and self care/home management    Sasha Vicente, PT, BCB-PMD

## 2020-10-19 ENCOUNTER — OFFICE VISIT (OUTPATIENT)
Dept: UROLOGY | Facility: CLINIC | Age: 65
End: 2020-10-19
Payer: COMMERCIAL

## 2020-10-19 VITALS
BODY MASS INDEX: 32.2 KG/M2 | DIASTOLIC BLOOD PRESSURE: 77 MMHG | HEART RATE: 55 BPM | SYSTOLIC BLOOD PRESSURE: 129 MMHG | HEIGHT: 69 IN | WEIGHT: 217.38 LBS

## 2020-10-19 DIAGNOSIS — C61 PROSTATE CANCER: ICD-10-CM

## 2020-10-19 DIAGNOSIS — N52.31 ERECTILE DYSFUNCTION FOLLOWING RADICAL PROSTATECTOMY: Primary | ICD-10-CM

## 2020-10-19 DIAGNOSIS — I10 ESSENTIAL HYPERTENSION: ICD-10-CM

## 2020-10-19 DIAGNOSIS — E11.9 TYPE 2 DIABETES MELLITUS WITHOUT COMPLICATION, WITHOUT LONG-TERM CURRENT USE OF INSULIN: ICD-10-CM

## 2020-10-19 DIAGNOSIS — E78.00 HYPERCHOLESTEROLEMIA: ICD-10-CM

## 2020-10-19 PROBLEM — Z98.890 HISTORY OF CORONARY ANGIOGRAM: Status: RESOLVED | Noted: 2020-03-18 | Resolved: 2020-10-19

## 2020-10-19 PROBLEM — Z82.49 FAMILY HISTORY OF HEART DISEASE: Status: RESOLVED | Noted: 2020-03-18 | Resolved: 2020-10-19

## 2020-10-19 PROCEDURE — 3078F DIAST BP <80 MM HG: CPT | Mod: CPTII,S$GLB,, | Performed by: UROLOGY

## 2020-10-19 PROCEDURE — 3008F BODY MASS INDEX DOCD: CPT | Mod: CPTII,S$GLB,, | Performed by: UROLOGY

## 2020-10-19 PROCEDURE — 99214 PR OFFICE/OUTPT VISIT, EST, LEVL IV, 30-39 MIN: ICD-10-PCS | Mod: S$GLB,,, | Performed by: UROLOGY

## 2020-10-19 PROCEDURE — 3078F PR MOST RECENT DIASTOLIC BLOOD PRESSURE < 80 MM HG: ICD-10-PCS | Mod: CPTII,S$GLB,, | Performed by: UROLOGY

## 2020-10-19 PROCEDURE — 3074F PR MOST RECENT SYSTOLIC BLOOD PRESSURE < 130 MM HG: ICD-10-PCS | Mod: CPTII,S$GLB,, | Performed by: UROLOGY

## 2020-10-19 PROCEDURE — 3051F PR MOST RECENT HEMOGLOBIN A1C LEVEL 7.0 - < 8.0%: ICD-10-PCS | Mod: CPTII,S$GLB,, | Performed by: UROLOGY

## 2020-10-19 PROCEDURE — 99999 PR PBB SHADOW E&M-EST. PATIENT-LVL IV: ICD-10-PCS | Mod: PBBFAC,,, | Performed by: UROLOGY

## 2020-10-19 PROCEDURE — 3008F PR BODY MASS INDEX (BMI) DOCUMENTED: ICD-10-PCS | Mod: CPTII,S$GLB,, | Performed by: UROLOGY

## 2020-10-19 PROCEDURE — 3074F SYST BP LT 130 MM HG: CPT | Mod: CPTII,S$GLB,, | Performed by: UROLOGY

## 2020-10-19 PROCEDURE — 99999 PR PBB SHADOW E&M-EST. PATIENT-LVL IV: CPT | Mod: PBBFAC,,, | Performed by: UROLOGY

## 2020-10-19 PROCEDURE — 99214 OFFICE O/P EST MOD 30 MIN: CPT | Mod: S$GLB,,, | Performed by: UROLOGY

## 2020-10-19 PROCEDURE — 3051F HG A1C>EQUAL 7.0%<8.0%: CPT | Mod: CPTII,S$GLB,, | Performed by: UROLOGY

## 2020-10-19 RX ORDER — TADALAFIL 20 MG/1
TABLET ORAL
Qty: 30 TABLET | Refills: 11 | Status: SHIPPED | OUTPATIENT
Start: 2020-10-19 | End: 2022-10-05

## 2020-10-19 NOTE — PROGRESS NOTES
Mr. Moon is a 65 y.o. male with prostate cancer and ED.    PRESENTING ILLNESS:    Zohaib Moon is a 65 y.o. male with prostate cancer s/p RALP on 5/6/20 by Dr. Maier.  Since then, he c/o ED.  Prior to surgery, he did not have ED.  He's used Cialis 5 mg with some engorgement.  No erection.    He does have RUIZ.  He is wearing 0-1 pads/day.  No hematuria.  No dysuria.  Good FOS.    No bone pain or weight loss.    REVIEW OF SYSTEMS:    Zohaib Moon denies headache, blurred vision, fever, nausea, vomiting, chills, abdominal pain, chest pain, sore throat, bleeding per rectum, cough, SOB, recent loss of consciousness, recent mental status changes, seizures, dizziness, or upper or lower extremity weakness.    ELSA  1. 1  2. 0  3. 0  4. 0  5. 0      PATIENT HISTORY:    Past Medical History:   Diagnosis Date    Cancer     Diabetes mellitus     Diabetes mellitus, type 2     Elevated PSA     Family history of heart disease 3/18/2020    History of coronary angiogram 3/18/2020    Hyperlipidemia     Hypertension        Family History   Problem Relation Age of Onset    Heart disease Father     Kidney disease Father     Diabetes Father     Heart disease Mother        Past Surgical History:   Procedure Laterality Date    APPENDECTOMY      LIPOMA RESECTION  2018    ROBOT-ASSISTED LAPAROSCOPIC PELVIC LYMPHADENECTOMY USING DA AISHA XI  5/6/2020    Procedure: XI ROBOTIC LYMPHADENECTOMY, PELVIC;  Surgeon: Ti Maier MD;  Location: [address];  Service: Urology;;    ROBOT-ASSISTED LAPAROSCOPIC PROSTATECTOMY USING DA AISHA XI N/A 5/6/2020    Procedure: XI ROBOTIC PROSTATECTOMY;  Surgeon: Ti Maier MD;  [facility] [facility] [address] [place] [address];  Service: Urology;  Laterality: N/A;  3hrs gen with regional      TRUS Bx  01/15/2020    VASECTOMY         Social History     Socioeconomic History    Marital status:      Spouse name: Shilpa    Number of children: 2    Years of education: 16    Highest education  level: Bachelor's degree (e.g., BA, AB, BS)   Occupational History    Not on file   Social Needs    Financial resource strain: Patient refused    Food insecurity     Worry: Patient refused     Inability: Patient refused    Transportation needs     Medical: Patient refused     Non-medical: Patient refused   Tobacco Use    Smoking status: Never Smoker    Smokeless tobacco: Never Used   Substance and Sexual Activity    Alcohol use: Yes     Alcohol/week: 8.0 standard drinks     Types: 8 Shots of liquor per week     Comment: 3-4 drinks a week    Drug use: Never    Sexual activity: Yes     Partners: Female   Lifestyle    Physical activity     Days per week: 2 days     Minutes per session: 20 min    Stress: Only a little   Relationships    Social connections     Talks on phone: Patient refused     Gets together: Patient refused     Attends [employer] service: Patient refused     Active member of club or organization: Patient refused     Attends meetings of clubs or organizations: Patient refused     Relationship status: Patient refused   Other Topics Concern    Not on file   Social History Narrative    Not on file       Review of patient's allergies indicates:  No Known Allergies      Current Outpatient Medications:     aspirin (ECOTRIN) 81 MG EC tablet, Take 81 mg by mouth once daily., Disp: , Rfl:     atorvastatin (LIPITOR) 20 MG tablet,  See Instructions, TAKE 1 TABLET BY MOUTH EVERY DAY, # 90 tab(s), 3 Refill(s), Pharmacy: [facility]/pharmacy #[id number], Disp: , Rfl:     carvediloL (COREG) 6.25 MG tablet,  See Instructions, TAKE 1 TABLET BY MOUTH TWICE A DAY, # 180 tab(s), 3 Refill(s), Pharmacy: [facility]/pharmacy #[id number], Disp: , Rfl:     FREESTYLE COLBY 14 DAY SENSOR Kit, USE AS DIRECTED, Disp: , Rfl:     lisinopril (PRINIVIL,ZESTRIL) 20 MG tablet,  See Instructions, TAKE 1 TABLET BY MOUTH EVERY DAY, # 90 tab(s), 3 Refill(s), Pharmacy: [facility]/pharmacy #[id number], Disp: , Rfl:     metFORMIN (GLUCOPHAGE) 500 MG tablet,  See  Instructions, TAKE 1 TABLET BY MOUTH TWICE A DAY, # 180 tab(s), 3 Refill(s), Pharmacy: [facility]/pharmacy #[id number], Disp: , Rfl:     multivitamin (ONE DAILY MULTIVITAMIN) per tablet, Take 1 tablet by mouth every evening., Disp: , Rfl:     tadalafiL (CIALIS) 5 MG tablet, Take 1 PO QOD, Disp: 30 tablet, Rfl: 11      PHYSICAL EXAMINATION:    The patient generally appears in good health, is appropriately interactive, and is in no apparent distress.     Eyes: anicteric sclerae, moist conjunctivae; no lid-lag; PERRLA     HENT: Atraumatic; oropharynx clear with moist mucous membranes and no mucosal ulcerations;normal hard and soft palate. No evidence of lymphadenopathy.    Neck: Trachea midline.  No thyromegaly.    Musculoskeletal: No abnormal gait.    Skin: No lesions.    Mental: Cooperative with normal affect.  Is oriented to time, place, and person.    Neuro: Grossly intact.    Chest: Normal inspiratory effort.   No accessory muscles.  No audible wheezes.  Respirations symmetric on inspiration and expiration.    Heart: Regular rhythm.      Abdomen:  Soft, non-tender. No masses or organomegaly. Bladder is not palpable. No evidence of flank discomfort. No evidence of inguinal hernia.    Genitourinary: The penis is circumcised with no evidence of plaques or induration. The urethral meatus is normal. The testes, epididymides, and cord structures are normal in size and contour bilaterally. The scrotum is normal in size and contour.    Extremities: No clubbing, cyanosis, or edema        LABS:      Lab Results   Component Value Date    PSADIAG 0.03 06/23/2020        IMPRESSION:    Encounter Diagnoses   Name Primary?    Erectile dysfunction following radical prostatectomy Yes    Prostate cancer     Type 2 diabetes mellitus without complication, without long-term current use of insulin     Essential hypertension     Hypercholesterolemia    HTN, controlled  Hyperlipidemia, controlled    PLAN:    1. Discussed options for his ED.   He'd like to take Cialis 20 mg. Side effects discussed.  A new Rx was given   2. RTC 3 months    Copy to:

## 2020-12-23 ENCOUNTER — DOCUMENTATION ONLY (OUTPATIENT)
Dept: REHABILITATION | Facility: HOSPITAL | Age: 65
End: 2020-12-23

## 2020-12-23 NOTE — PROGRESS NOTES
12/23/2020    Pt has not returned for therapy sessions since 8/13/2020 and will be discharged from PT services with goal status unknown.

## 2021-01-14 ENCOUNTER — PATIENT MESSAGE (OUTPATIENT)
Dept: UROLOGY | Facility: CLINIC | Age: 66
End: 2021-01-14

## 2021-02-04 ENCOUNTER — PATIENT MESSAGE (OUTPATIENT)
Dept: UROLOGY | Facility: CLINIC | Age: 66
End: 2021-02-04

## 2021-09-28 ENCOUNTER — HISTORICAL (OUTPATIENT)
Dept: LAB | Facility: HOSPITAL | Age: 66
End: 2021-09-28

## 2021-09-28 LAB
ABS NEUT (OLG): 3.1 X10(3)/MCL (ref 2.1–9.2)
ALBUMIN SERPL-MCNC: 3.7 GM/DL (ref 3.4–4.8)
ALBUMIN/GLOB SERPL: 1.2 RATIO (ref 1.1–2)
ALP SERPL-CCNC: 57 UNIT/L (ref 40–150)
ALT SERPL-CCNC: 20 UNIT/L (ref 0–55)
APPEARANCE, UA: CLEAR
AST SERPL-CCNC: 16 UNIT/L (ref 5–34)
BASOPHILS # BLD AUTO: 0.06 X10(3)/MCL (ref 0–0.2)
BASOPHILS NFR BLD AUTO: 0.9 % (ref 0–0.9)
BILIRUB SERPL-MCNC: 1 MG/DL (ref 0.2–1.2)
BILIRUB UR QL STRIP: NEGATIVE
BILIRUBIN DIRECT+TOT PNL SERPL-MCNC: 0.3 MG/DL (ref 0–0.5)
BILIRUBIN DIRECT+TOT PNL SERPL-MCNC: 0.7 MG/DL (ref 0–0.8)
BUN SERPL-MCNC: 26.4 MG/DL (ref 8.4–25.7)
CALCIUM SERPL-MCNC: 9.8 MG/DL (ref 8.8–10)
CHLORIDE SERPL-SCNC: 105 MMOL/L (ref 98–107)
CHOLEST SERPL-MCNC: 172 MG/DL
CHOLEST/HDLC SERPL: 4 {RATIO} (ref 0–5)
CO2 SERPL-SCNC: 27 MMOL/L (ref 23–31)
COLOR UR: YELLOW
CREAT SERPL-MCNC: 1.11 MG/DL (ref 0.72–1.25)
CREAT UR-MCNC: 232.4 MG/DL (ref 58–161)
EOSINOPHIL # BLD AUTO: 0.19 X10(3)/MCL (ref 0–0.9)
EOSINOPHIL NFR BLD AUTO: 2.8 % (ref 0–6.5)
ERYTHROCYTE [DISTWIDTH] IN BLOOD BY AUTOMATED COUNT: 12.6 % (ref 11.5–17)
EST. AVERAGE GLUCOSE BLD GHB EST-MCNC: 154.2 MG/DL
GLOBULIN SER-MCNC: 3.2 GM/DL (ref 2.4–3.5)
GLUCOSE (UA): NEGATIVE
GLUCOSE SERPL-MCNC: 137 MG/DL (ref 82–115)
HBA1C MFR BLD: 7 %
HCT VFR BLD AUTO: 43.4 % (ref 42–52)
HDLC SERPL-MCNC: 45 MG/DL (ref 40–60)
HGB BLD-MCNC: 14.4 GM/DL (ref 14–18)
HGB UR QL STRIP: NEGATIVE
IMM GRANULOCYTES # BLD AUTO: 0.02 10*3/UL (ref 0–0.02)
IMM GRANULOCYTES NFR BLD AUTO: 0.3 % (ref 0–0.43)
KETONES UR QL STRIP: NEGATIVE
LDLC SERPL CALC-MCNC: 105 MG/DL (ref 50–140)
LEUKOCYTE ESTERASE UR QL STRIP: NEGATIVE
LYMPHOCYTES # BLD AUTO: 2.73 X10(3)/MCL (ref 0.6–4.6)
LYMPHOCYTES NFR BLD AUTO: 40 % (ref 16.2–38.3)
MCH RBC QN AUTO: 28.7 PG (ref 27–31)
MCHC RBC AUTO-ENTMCNC: 33.2 GM/DL (ref 33–36)
MCV RBC AUTO: 86.5 FL (ref 80–94)
MICROALBUMIN UR-MCNC: 7.7 UG/ML
MICROALBUMIN/CREAT RATIO PNL UR: 3.3 MG/GM CR (ref 0–30)
MONOCYTES # BLD AUTO: 0.73 X10(3)/MCL (ref 0.1–1.3)
MONOCYTES NFR BLD AUTO: 10.7 % (ref 4.7–11.3)
NEUTROPHILS # BLD AUTO: 3.1 X10(3)/MCL (ref 2.1–9.2)
NEUTROPHILS NFR BLD AUTO: 45.3 % (ref 49.1–73.4)
NITRITE UR QL STRIP: NEGATIVE
NRBC BLD AUTO-RTO: 0 % (ref 0–0.2)
PH UR STRIP: 5.5 [PH] (ref 5–7)
PLATELET # BLD AUTO: 268 X10(3)/MCL (ref 130–400)
PMV BLD AUTO: 9 FL (ref 7.4–10.4)
POTASSIUM SERPL-SCNC: 4.5 MMOL/L (ref 3.5–5.1)
PROT SERPL-MCNC: 6.9 GM/DL (ref 5.8–7.6)
PROT UR QL STRIP: NEGATIVE
RBC # BLD AUTO: 5.02 X10(6)/MCL (ref 4.7–6.1)
SODIUM SERPL-SCNC: 141 MMOL/L (ref 136–145)
SP GR UR STRIP: >=1.03 (ref 1–1.03)
TRIGL SERPL-MCNC: 108 MG/DL (ref 0–150)
TSH SERPL-ACNC: 1.22 UIU/ML (ref 0.35–4.94)
UROBILINOGEN UR STRIP-ACNC: NEGATIVE
VLDLC SERPL CALC-MCNC: 22 MG/DL
WBC # SPEC AUTO: 6.8 X10(3)/MCL (ref 4.5–11.5)

## 2021-11-29 LAB — CRC RECOMMENDATION EXT: NORMAL

## 2021-12-28 ENCOUNTER — PATIENT MESSAGE (OUTPATIENT)
Dept: UROLOGY | Facility: CLINIC | Age: 66
End: 2021-12-28
Payer: COMMERCIAL

## 2022-01-08 ENCOUNTER — PATIENT MESSAGE (OUTPATIENT)
Dept: UROLOGY | Facility: CLINIC | Age: 67
End: 2022-01-08
Payer: COMMERCIAL

## 2022-01-24 ENCOUNTER — HISTORICAL (OUTPATIENT)
Dept: ADMINISTRATIVE | Facility: HOSPITAL | Age: 67
End: 2022-01-24

## 2022-01-31 ENCOUNTER — PATIENT MESSAGE (OUTPATIENT)
Dept: UROLOGY | Facility: CLINIC | Age: 67
End: 2022-01-31
Payer: COMMERCIAL

## 2022-04-11 ENCOUNTER — HISTORICAL (OUTPATIENT)
Dept: ADMINISTRATIVE | Facility: HOSPITAL | Age: 67
End: 2022-04-11
Payer: COMMERCIAL

## 2022-04-28 VITALS
SYSTOLIC BLOOD PRESSURE: 139 MMHG | DIASTOLIC BLOOD PRESSURE: 89 MMHG | WEIGHT: 218 LBS | HEIGHT: 70 IN | OXYGEN SATURATION: 99 % | BODY MASS INDEX: 31.21 KG/M2

## 2022-04-30 NOTE — OP NOTE
Patient:   Zohaib Moon             MRN: [id number]            FIN: [id number]               Age:   62 years     Sex:  Male     :  1955   Associated Diagnoses:   Soft tissue mass   Author:   Nikhil Motta MD      Operative Note   Operative Information   Date/ Time:  2017 10:00:00.     Procedures Performed: excision of soft tissue mass, shoulder 5 cm .     Preoperative Diagnosis: Soft tissue mass (KVP18-GP M79.9).     Postoperative Diagnosis: Soft tissue mass (AZZ67-IO M79.9).     Surgeon: Nikhil Motta MD.     Anesthesia: Local, MAC.     Speciman Removed: soft tissue mass.     Description of Procedure/Findings/    Complications: Area prepped and draped.  Local infused.  Incision performed to encompass mass.  Complete excision performed.  Wound closed primarily.  .     Esimated blood loss: No blood loss.     Complications: None.
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
## 2022-09-23 LAB
LEFT EYE DM RETINOPATHY: NEGATIVE
LEFT EYE DM RETINOPATHY: NEGATIVE
RIGHT EYE DM RETINOPATHY: NEGATIVE
RIGHT EYE DM RETINOPATHY: NEGATIVE

## 2022-10-05 ENCOUNTER — OFFICE VISIT (OUTPATIENT)
Dept: FAMILY MEDICINE | Facility: CLINIC | Age: 67
End: 2022-10-05
Payer: MEDICARE

## 2022-10-05 ENCOUNTER — LAB VISIT (OUTPATIENT)
Dept: LAB | Facility: HOSPITAL | Age: 67
End: 2022-10-05
Attending: FAMILY MEDICINE
Payer: MEDICARE

## 2022-10-05 VITALS
BODY MASS INDEX: 29.83 KG/M2 | TEMPERATURE: 98 F | WEIGHT: 208.38 LBS | DIASTOLIC BLOOD PRESSURE: 82 MMHG | SYSTOLIC BLOOD PRESSURE: 130 MMHG | OXYGEN SATURATION: 97 % | HEART RATE: 69 BPM | RESPIRATION RATE: 16 BRPM

## 2022-10-05 DIAGNOSIS — Z00.00 WELLNESS EXAMINATION: ICD-10-CM

## 2022-10-05 DIAGNOSIS — E11.69 HYPERLIPIDEMIA ASSOCIATED WITH TYPE 2 DIABETES MELLITUS: ICD-10-CM

## 2022-10-05 DIAGNOSIS — Z23 NEEDS FLU SHOT: ICD-10-CM

## 2022-10-05 DIAGNOSIS — E11.59 HYPERTENSION ASSOCIATED WITH DIABETES: ICD-10-CM

## 2022-10-05 DIAGNOSIS — I15.2 HYPERTENSION ASSOCIATED WITH DIABETES: ICD-10-CM

## 2022-10-05 DIAGNOSIS — C61 PROSTATE CANCER: ICD-10-CM

## 2022-10-05 DIAGNOSIS — E11.9 TYPE 2 DIABETES MELLITUS WITHOUT COMPLICATION, WITHOUT LONG-TERM CURRENT USE OF INSULIN: ICD-10-CM

## 2022-10-05 DIAGNOSIS — E78.5 HYPERLIPIDEMIA ASSOCIATED WITH TYPE 2 DIABETES MELLITUS: ICD-10-CM

## 2022-10-05 LAB
APPEARANCE UR: CLEAR
BILIRUB UR QL STRIP.AUTO: NEGATIVE MG/DL
COLOR UR AUTO: ABNORMAL
CREAT UR-MCNC: 220.7 MG/DL (ref 63–166)
GLUCOSE UR QL STRIP.AUTO: NEGATIVE MG/DL
KETONES UR QL STRIP.AUTO: NEGATIVE MG/DL
LEUKOCYTE ESTERASE UR QL STRIP.AUTO: NEGATIVE UNIT/L
MICROALBUMIN UR-MCNC: 9 UG/ML
MICROALBUMIN/CREAT RATIO PNL UR: 4.1 MG/GM CR (ref 0–30)
NITRITE UR QL STRIP.AUTO: NEGATIVE
PH UR STRIP.AUTO: 5 [PH]
PROT UR QL STRIP.AUTO: NEGATIVE MG/DL
RBC UR QL AUTO: NEGATIVE UNIT/L
SP GR UR STRIP.AUTO: >=1.03
UROBILINOGEN UR STRIP-ACNC: 0.2 MG/DL

## 2022-10-05 PROCEDURE — 1126F AMNT PAIN NOTED NONE PRSNT: CPT | Mod: CPTII,,, | Performed by: FAMILY MEDICINE

## 2022-10-05 PROCEDURE — 3008F BODY MASS INDEX DOCD: CPT | Mod: CPTII,,, | Performed by: FAMILY MEDICINE

## 2022-10-05 PROCEDURE — G0008 FLU VACCINE - QUADRIVALENT - ADJUVANTED: ICD-10-PCS | Mod: ,,, | Performed by: FAMILY MEDICINE

## 2022-10-05 PROCEDURE — 1126F PR PAIN SEVERITY QUANTIFIED, NO PAIN PRESENT: ICD-10-PCS | Mod: CPTII,,, | Performed by: FAMILY MEDICINE

## 2022-10-05 PROCEDURE — 1159F MED LIST DOCD IN RCRD: CPT | Mod: CPTII,,, | Performed by: FAMILY MEDICINE

## 2022-10-05 PROCEDURE — 1101F PT FALLS ASSESS-DOCD LE1/YR: CPT | Mod: CPTII,,, | Performed by: FAMILY MEDICINE

## 2022-10-05 PROCEDURE — G0439 PPPS, SUBSEQ VISIT: HCPCS | Mod: ,,, | Performed by: FAMILY MEDICINE

## 2022-10-05 PROCEDURE — 1159F PR MEDICATION LIST DOCUMENTED IN MEDICAL RECORD: ICD-10-PCS | Mod: CPTII,,, | Performed by: FAMILY MEDICINE

## 2022-10-05 PROCEDURE — 3075F PR MOST RECENT SYSTOLIC BLOOD PRESS GE 130-139MM HG: ICD-10-PCS | Mod: CPTII,,, | Performed by: FAMILY MEDICINE

## 2022-10-05 PROCEDURE — 1101F PR PT FALLS ASSESS DOC 0-1 FALLS W/OUT INJ PAST YR: ICD-10-PCS | Mod: CPTII,,, | Performed by: FAMILY MEDICINE

## 2022-10-05 PROCEDURE — 3079F DIAST BP 80-89 MM HG: CPT | Mod: CPTII,,, | Performed by: FAMILY MEDICINE

## 2022-10-05 PROCEDURE — 3288F FALL RISK ASSESSMENT DOCD: CPT | Mod: CPTII,,, | Performed by: FAMILY MEDICINE

## 2022-10-05 PROCEDURE — 4010F ACE/ARB THERAPY RXD/TAKEN: CPT | Mod: CPTII,,, | Performed by: FAMILY MEDICINE

## 2022-10-05 PROCEDURE — 3288F PR FALLS RISK ASSESSMENT DOCUMENTED: ICD-10-PCS | Mod: CPTII,,, | Performed by: FAMILY MEDICINE

## 2022-10-05 PROCEDURE — 3075F SYST BP GE 130 - 139MM HG: CPT | Mod: CPTII,,, | Performed by: FAMILY MEDICINE

## 2022-10-05 PROCEDURE — 82043 UR ALBUMIN QUANTITATIVE: CPT

## 2022-10-05 PROCEDURE — G0008 ADMIN INFLUENZA VIRUS VAC: HCPCS | Mod: ,,, | Performed by: FAMILY MEDICINE

## 2022-10-05 PROCEDURE — 1160F RVW MEDS BY RX/DR IN RCRD: CPT | Mod: CPTII,,, | Performed by: FAMILY MEDICINE

## 2022-10-05 PROCEDURE — G0439 PR MEDICARE ANNUAL WELLNESS SUBSEQUENT VISIT: ICD-10-PCS | Mod: ,,, | Performed by: FAMILY MEDICINE

## 2022-10-05 PROCEDURE — 90694 VACC AIIV4 NO PRSRV 0.5ML IM: CPT | Mod: ,,, | Performed by: FAMILY MEDICINE

## 2022-10-05 PROCEDURE — 1160F PR REVIEW ALL MEDS BY PRESCRIBER/CLIN PHARMACIST DOCUMENTED: ICD-10-PCS | Mod: CPTII,,, | Performed by: FAMILY MEDICINE

## 2022-10-05 PROCEDURE — 3008F PR BODY MASS INDEX (BMI) DOCUMENTED: ICD-10-PCS | Mod: CPTII,,, | Performed by: FAMILY MEDICINE

## 2022-10-05 PROCEDURE — 4010F PR ACE/ARB THEARPY RXD/TAKEN: ICD-10-PCS | Mod: CPTII,,, | Performed by: FAMILY MEDICINE

## 2022-10-05 PROCEDURE — 90694 FLU VACCINE - QUADRIVALENT - ADJUVANTED: ICD-10-PCS | Mod: ,,, | Performed by: FAMILY MEDICINE

## 2022-10-05 PROCEDURE — 3079F PR MOST RECENT DIASTOLIC BLOOD PRESSURE 80-89 MM HG: ICD-10-PCS | Mod: CPTII,,, | Performed by: FAMILY MEDICINE

## 2022-10-05 NOTE — PROGRESS NOTES
Please inform patient of results.    Urine micro is stable    Other labwork within acceptable ranges.

## 2022-10-05 NOTE — PROGRESS NOTES
Please inform patient of results.    1. Urine is wnl other than dark. Drink more water. monitor    Other labwork within acceptable ranges.

## 2022-10-05 NOTE — PROGRESS NOTES
Subjective:        Patient ID: Zohaib Moon is a 67 y.o. male.    Chief Complaint: No chief complaint on file.      presents to the clinic for his wellness visit. he is due for labs    The patient has had elevated PSA. He was seeing Dr. Hernandez in [place]. he was diagnosed with prostate cancer 2019. Had prostate removal with dr. rambo adair at [facility] - robotic surgery. no radiation. Was seeing therapist at [facility] for his incontinence. on tadalafil 5mg QOD.    Patient has a history of diabetes. He has not been checking his blood sugars however he reports compliance with his medication (metformin). His last foot exam was done today. urine micro 9/2021. He had his eye exam done at Dr. Mcleod's office this month. we will request. A1c 7.0 9/2021. has been riding bike. doing weights.    He has a history of hypertension and hyperlipidemia. He is compliant with his medications. He is also on a baby aspirin.    He is not ALLERGIC to any medications. He drinks alcohol rarely. He does not smoke. He had a colonoscopy done with Dr. Ivory March 2017. thinks had polyps and is due for repeat. will place referral for repeat colonoscopy if due. would like to get flu shot today      Review of Systems      Review of patient's allergies indicates:  No Known Allergies   There were no vitals filed for this visit.   Social History     Socioeconomic History    Marital status:      Spouse name: Shilpa    Number of children: 2    Years of education: 16    Highest education level: Bachelor's degree (e.g., BA, AB, BS)   Tobacco Use    Smoking status: Never    Smokeless tobacco: Never   Substance and Sexual Activity    Alcohol use: Yes     Alcohol/week: 8.0 standard drinks     Types: 8 Shots of liquor per week     Comment: 3-4 drinks a week    Drug use: Never    Sexual activity: Yes     Partners: Female      Family History   Problem Relation Age of Onset    Heart disease Father     Kidney disease Father     Diabetes Father     Heart  disease Mother           Objective:     Physical Exam  Current Outpatient Medications on File Prior to Visit   Medication Sig Dispense Refill    aspirin (ECOTRIN) 81 MG EC tablet Take 81 mg by mouth once daily.      atorvastatin (LIPITOR) 20 MG tablet   See Instructions, TAKE 1 TABLET BY MOUTH EVERY DAY, # 90 tab(s), 3 Refill(s), Pharmacy: [facility]/pharmacy #[id number]      calcium carbonate/vitamin D3 (VITAMIN D-3 ORAL) Take 2,000 Int'l Units by mouth.      carvediloL (COREG) 6.25 MG tablet   See Instructions, TAKE 1 TABLET BY MOUTH TWICE A DAY, # 180 tab(s), 3 Refill(s), Pharmacy: [facility]/pharmacy #[id number]      FREESTYLE COLBY 14 DAY SENSOR Kit USE AS DIRECTED      lisinopril (PRINIVIL,ZESTRIL) 20 MG tablet   See Instructions, TAKE 1 TABLET BY MOUTH EVERY DAY, # 90 tab(s), 3 Refill(s), Pharmacy: [facility]/pharmacy #[id number]      metFORMIN (GLUCOPHAGE) 500 MG tablet   See Instructions, TAKE 1 TABLET BY MOUTH TWICE A DAY, # 180 tab(s), 3 Refill(s), Pharmacy: [facility]/pharmacy #[id number]      multivitamin (ONE DAILY MULTIVITAMIN) per tablet Take 1 tablet by mouth every evening.      OMEGA-3-DHA-EPA-FISH OIL ORAL Take 1,000 mg by mouth.      tadalafiL (CIALIS) 20 MG Tab 1 PO QOD 30 tablet 11    zinc 50 mg Tab   0 Refill(s)       No current facility-administered medications on file prior to visit.     Health Maintenance   Topic Date Due    Hepatitis C Screening  Never done    Lipid Panel  09/28/2026    TETANUS VACCINE  12/03/2029      Results for orders placed or performed in visit on 09/28/21   Comprehensive Metabolic Panel   Result Value Ref Range    Alanine Aminotransferase 20 0 - 55 unit/L    Albumin/Globulin Ratio 1.2 1.1 - 2.0 ratio    Alkaline Phosphatase 57 40 - 150 unit/L    Aspartate Aminotransferase 16 5 - 34 unit/L    Albumin Level 3.7 3.4 - 4.8 gm/dL    Bilirubin Total 1.0 0.2 - 1.2 mg/dL    Blood Urea Nitrogen 26.4 (H) 8.4 - 25.7 mg/dL    Bilirubin Direct 0.3 0.0 - 0.5 mg/dL    Bilirubin Indirect 0.70 0.00 - 0.80 mg/dL    Chloride 105 98 -  107 mmol/L    Calcium Level Total 9.8 8.8 - 10.0 mg/dL    Carbon Dioxide 27 23 - 31 mmol/L    Creatinine 1.11 0.72 - 1.25 mg/dL    Glucose Level 137 (H) 82 - 115 mg/dL    Globulin 3.2 2.4 - 3.5 gm/dL    Potassium Level 4.5 3.5 - 5.1 mmol/L    Sodium Level 141 136 - 145 mmol/L    Protein Total 6.9 5.8 - 7.6 gm/dL   Lipid Panel   Result Value Ref Range    Cholesterol/HDL Ratio 4 0 - 5    Cholesterol Total 172 <<=200 mg/dL    HDL Cholesterol 45 40 - 60 mg/dL    LDL Cholesterol 105.00 50.00 - 140.00 mg/dL    Triglyceride 108 0 - 150 mg/dL    Very Low Density Lipoprotein 22    Hemoglobin A1C   Result Value Ref Range    Estimated Average Glucose 154.2 mg/dL    Hemoglobin A1c 7.0 <<=7.0 %   Microalbumin/Creatinine Ratio, Urine   Result Value Ref Range    Microalbumin Creatinine Ratio 3.3 0.0 - 30.0 mg/gm Cr    Urine Creatinine 232.4 (H) 58.0 - 161.0 mg/dL    Urine Microalbumin 7.7 <<=30.0 ug/ml   TSH   Result Value Ref Range    Thyroid Stimulating Hormone 1.2167 0.3500 - 4.9400 uIU/mL   GFR, Estimated   Result Value Ref Range    Estimated GFR- >60     Estimated GFR-Non African American >60 mL/min/1.73 m2   Differential   Result Value Ref Range    Basophil % 0.9 0.0 - 0.9 %    Neut # 3.10 2.10 - 9.20 x10(3)/mcL    Lymph # 2.73 0.60 - 4.60 x10(3)/mcL    Eos # 0.19 0.00 - 0.90 x10(3)/mcL    Mono # 0.73 0.10 - 1.30 x10(3)/mcL    Baso # 0.06 0.00 - 0.20 x10(3)/mcL    IG% 0.300 0.000 - 0.430 %    IG# 0.0200 (H) 0.0000 - 0.0155    NRBC% 0.0 0.0 - 0.2 %    Neut % 45.3 (L) 49.1 - 73.4 %    Mono % 10.7 4.7 - 11.3 %    Lymph % 40.0 (H) 16.2 - 38.3 %    Eos % 2.8 0.0 - 6.5 %   CBC Auto Differential   Result Value Ref Range    Abs Neut 3.10 2.10 - 9.20 x10(3)/mcL    MCHC 33.2 33.0 - 36.0 gm/dL    MPV 9.0 7.4 - 10.4 fL    Hct 43.4 42.0 - 52.0 %    Hgb 14.4 14.0 - 18.0 gm/dL    MCH 28.7 27.0 - 31.0 pg    MCV 86.5 80.0 - 94.0 fL    RDW 12.6 11.5 - 17.0 %    Platelet 268 130 - 400 x10(3)/mcL    RBC 5.02 4.70 - 6.10  x10(6)/mcL    WBC 6.8 4.5 - 11.5 x10(3)/mcL   Urinalysis, Reflex to Urine Culture    Specimen: Urine   Result Value Ref Range    Nitrite, UA Negative >Negative    Ketones, UA Negative >Negative    Occult Blood UA Negative >Negative    Appearance, UA CLEAR >CLEAR    Protein, UA Negative >Negative    Urobilinogen, UA Negative >Negative    Specific Gravity,UA >=1.030 (A) 1.005 - 1.030    Leukocytes, UA Negative >Negative    Glucose, UA Negative >Negative    Color, UA YELLOW >YELLOW    Bilirubin (UA) Negative >Negative    pH, UA 5.5 5.0 - 7.0          Assessment & Plan:     Active Problem List with Overview Notes    Diagnosis Date Noted    Wellness examination 10/05/2022    Erectile dysfunction following radical prostatectomy 10/19/2020    Hypertension 03/18/2020    Type 2 diabetes mellitus 03/18/2020    Hypercholesterolemia 03/18/2020    Hearing loss 03/18/2020    Class 1 obesity with body mass index (BMI) of 31.0 to 31.9 in adult 03/18/2020    Snoring 03/18/2020    Sinus bradycardia 03/18/2020    Total bilirubin, elevated 03/18/2020    Prostate cancer 02/11/2020       {There are no diagnoses linked to this encounter. (Refresh or delete this SmartLink)}     No follow-ups on file.

## 2022-10-05 NOTE — ASSESSMENT & PLAN NOTE
Fasting labs ordered. Will call with results when available  Will request colonoscopy report from dr. alcala  Flu shot today    Will plan on pcv 20 NV in 2 weeks.

## 2022-10-05 NOTE — ASSESSMENT & PLAN NOTE
Lab Results   Component Value Date    HGBA1C 7.0 09/28/2021     Urine micro ordered.   Foot exam today  Eye exam with dr. Mcleod.  Will request.     On metformin and statin and acei

## 2022-10-05 NOTE — PROGRESS NOTES
Patient ID: [id number]     Chief Complaint: Medicare AWV (Wellness/Patient needs lab orders/Patient requests flu shot)      HPI:     Zohaib Moon is a 67 y.o. male here today for a Medicare Wellness. No other complaints today.     presents to the clinic for his wellness visit. he is due for labs.  He would like his flu shot today    The patient has had elevated PSA. He was seeing Dr. Hernandez in [place]. he was diagnosed with prostate cancer 2019. Had prostate removal with dr. rambo adair at [facility] - robotic surgery. no radiation. He sees dr. Cannon locally- has appt today    Patient has a history of diabetes. He has not been checking his blood sugars however he reports compliance with his medication (metformin 500mg bid). His last foot exam was done today. urine micro ordered. He had his eye exam done at Dr. Duran. we will request. A1c 7.0 9/2021. has been riding bike. doing weights.    He has a history of hypertension and hyperlipidemia. He is compliant with his medications. He is on asa.  Not seeing cards locally.     He is not ALLERGIC to any medications. He drinks alcohol rarely. He does not smoke. He had a colonoscopy done with Dr. Ivory March 2017. thinks had polyps and is due for repeat. Will request record      ----------------------------  Elevated PSA  Family history of heart disease  History of coronary angiogram     Past Surgical History:   Procedure Laterality Date    APPENDECTOMY      LIPOMA RESECTION  2018    ROBOT-ASSISTED LAPAROSCOPIC PELVIC LYMPHADENECTOMY USING DA AISHA XI  5/6/2020    Procedure: XI ROBOTIC LYMPHADENECTOMY, PELVIC;  Surgeon: iT Maier MD;  Location: [facility] OR [address] [place] [address];  Service: Urology;;    ROBOT-ASSISTED LAPAROSCOPIC PROSTATECTOMY USING DA AISHA XI N/A 5/6/2020    Procedure: XI ROBOTIC PROSTATECTOMY;  Surgeon: Ti Maier MD;  Location: [facility] OR [address] [place] [address];  Service: Urology;  Laterality: N/A;  3hrs gen with regional      TRUS Bx  01/15/2020    VASECTOMY          Review of patient's allergies indicates:  No Known Allergies    Outpatient Medications Marked as Taking for the 10/5/22 encounter (Office Visit) with Yaritza Levy MD   Medication Sig Dispense Refill    atorvastatin (LIPITOR) 20 MG tablet   See Instructions, TAKE 1 TABLET BY MOUTH EVERY DAY, # 90 tab(s), 3 Refill(s), [id number]      calcium carbonate/vitamin D3 (VITAMIN D-3 ORAL) Take 2,000 Int'l Units by mouth.      carvediloL (COREG) 6.25 MG tablet   See Instructions, TAKE 1 TABLET BY MOUTH TWICE A DAY, # 180 tab(s), 3 Refill(s), Pharmacy: [facility]/pharmacy #[id number]      lisinopril (PRINIVIL,ZESTRIL) 20 MG tablet   See Instructions, TAKE 1 TABLET BY MOUTH EVERY DAY, # 90 tab(s), 3 Refill(s), [id number]      metFORMIN (GLUCOPHAGE) 500 MG tablet Take 500 mg by mouth 2 (two) times a day.      multivitamin (THERAGRAN) per tablet Take 1 tablet by mouth every evening.      OMEGA-3-DHA-EPA-FISH OIL ORAL Take 1,000 mg by mouth.      [DISCONTINUED] Orbital TractionE 14 DAY SENSOR Kit USE AS DIRECTED         Social History     Socioeconomic History    Marital status:      Spouse name: Shilpa    Number of children: 2    Years of education: 16    Highest education level: Bachelor's degree (e.g., BA, AB, BS)   Tobacco Use    Smoking status: Never    Smokeless tobacco: Never   Substance and Sexual Activity    Alcohol use: Yes     Alcohol/week: 8.0 standard drinks     Types: 8 Shots of liquor per week     Comment: 3-4 drinks a week    Drug use: Never    Sexual activity: Yes     Partners: Female        Family History   Problem Relation Age of Onset    Heart disease Father     Kidney disease Father     Diabetes Father     Heart disease Mother         Patient Care Team:  Yaritza Levy MD as PCP - General (Family Medicine)  Avila Mcleod MD as Consulting Physician (Ophthalmology)  Marcelino Ivory MD as Consulting Physician (Gastroenterology)  Franklin Cannon MD as Consulting  Physician (Urology)       Subjective:     Review of Systems   Constitutional: Negative.    HENT: Negative.     Eyes: Negative.    Respiratory: Negative.     Cardiovascular: Negative.    Gastrointestinal: Negative.    Genitourinary: Negative.    Musculoskeletal: Negative.    Skin: Negative.    Neurological: Negative.    Endo/Heme/Allergies: Negative.    Psychiatric/Behavioral: Negative.         Patient Reported Health Risk Assessment  What [age] your [age] 65-69  Are you male or female?: Male  During the past four weeks, how much have you been bothered by emotional problems such as feeling anxious, depressed, irritable, sad, or downhearted and blue?: Not at all  During the past five weeks, has your physical and/or emotional health limited your social activities with family, friends, neighbors, or groups?: Not at all  During the past four weeks, how much bodily pain have you generally had?: Mild pain  During the past four weeks, was someone available to help if you needed and wanted help?: Yes, as much as I wanted  During the past four weeks, what was the hardest physical activity you could do for at least two minutes?: Light  Can you get to places out of walking distance without help?  (For example, can you travel alone on buses or taxis, or drive your own car?): Yes  Can you go shopping for groceries or clothes without someone's help?: Yes  Can you prepare your own meals?: Yes  Can you do your own housework without help?: Yes  Because of any health problems, do you need the help of another person with your personal care needs such as eating, bathing, dressing, or getting around the house?: No  Can you handle your own money without help?: Yes  During the past four weeks, how would you rate your health in general?: Good  How have things been going for you during the past four weeks?: Good and bad parts about equal  Are you having difficulties driving your car?: No  Do you always fasten your seat belt when you are in a  car?: Yes, usually  How often in the past four weeks have you been bothered by falling or dizzy when standing up?: Never  How often in the past four weeks have you been bothered by sexual problems?: Never  How often in the past four weeks have you been bothered by trouble eating well?: Never  How often in the past four weeks have you been bothered by teeth or denture problems?: Never  How often in the past four weeks have you been bothered with problems using the telephone?: Never  How often in the past four weeks have you been bothered by tiredness or fatigue?: Never  Have you fallen two or more times in the past year?: No  Are you afraid of falling?: No  Are you a smoker?: No  During the past four weeks, how many drinks of wine, beer, or other alcoholic beverages did you have?: No alcohol at all  Do you exercise for about 20 minutes three or more days a week?: Yes, most of the time  Have you been given any information to help you with hazards in your house that might hurt you?: No  Have you been given any information to help you with keeping track of your medications?: No  How often do you have trouble taking medicines the way you've been told to take them?: I always take them as prescribed  How confident are you that you can control and manage most of your health problems?: Very confident  What is your race? (Check all that apply.):     Objective:     /82 (BP Location: Left arm)   Pulse 69   Temp 97.9 °F (36.6 °C)   Resp 16   Wt 94.5 kg (208 lb 6.4 oz)   SpO2 97%   BMI 29.83 kg/m²     Physical Exam  Vitals and nursing note reviewed.   Constitutional:       Appearance: Normal appearance. He is normal weight.   HENT:      Head: Normocephalic.      Nose: Nose normal.      Mouth/Throat:      Mouth: Mucous membranes are moist.      Pharynx: Oropharynx is clear.   Eyes:      Extraocular Movements: Extraocular movements intact.   Cardiovascular:      Rate and Rhythm: Normal rate and regular rhythm.       Pulses:           Dorsalis pedis pulses are 2+ on the right side and 2+ on the left side.        Posterior tibial pulses are 2+ on the right side and 2+ on the left side.   Pulmonary:      Effort: Pulmonary effort is normal.      Breath sounds: Normal breath sounds.   Musculoskeletal:         General: Normal range of motion.        Feet:    Feet:      Right foot:      Protective Sensation: 8 sites tested.  8 sites sensed.      Skin integrity: Skin integrity normal.      Left foot:      Protective Sensation: 8 sites tested.  8 sites sensed.      Skin integrity: Skin integrity normal.   Skin:     General: Skin is warm and dry.   Neurological:      General: No focal deficit present.      Mental Status: He is alert and oriented to person, place, and time. Mental status is at baseline.   Psychiatric:         Mood and Affect: Mood normal.         No flowsheet data found.  Fall Risk Assessment - Outpatient 10/5/2022 10/19/2020 7/16/2020 6/23/2020 2/11/2020   Mobility Status Ambulatory Ambulatory Ambulatory Ambulatory -   Number of falls 0 0 0 0 1 with injury   Identified as fall risk 0 0 0 - 0           Depression Screening  Over the past two weeks, has the patient felt down, depressed, or hopeless?: No  Over the past two weeks, has the patient felt little interest or pleasure in doing things?: No  Functional Ability/Safety Screening  Was the patient's timed Up & Go test unsteady or longer than 30 seconds?: No  Does the patient need help with phone, transportation, shopping, preparing meals, housework, laundry, meds, or managing money?: No  Does the patient's home have rugs in the hallway, lack grab bars in the bathroom, lack handrails on the stairs or have poor lighting?: No  Have you noticed any hearing difficulties?: No  Cognitive Function (Assessed through direct observation with due consideration of information obtained by way of patient reports and/or concerns raised by family, friends, caretakers, or others)     Does the patient repeat questions/statements in the same day?: No  Does the patient have trouble remembering the date, year, and time?: No  Does the patient have difficulty managing finances?: No  Does the patient have a decreased sense of direction?: No  Assessment/Plan:       Medicare Annual Wellness and Personalized Prevention Plan:   Fall Risk + Home Safety + Hearing Impairment + Depression Screen + Cognitive Impairment Screen + Health Risk Assessment all reviewed.     Opioid Screening: Patient medication list reviewed, patient is not taking prescription opioids. Patient is not using additional opioids than prescribed. Patient is at low risk of substance abuse based on this opioid use history.         Health Maintenance Topics with due status: Not Due       Topic Last Completion Date    TETANUS VACCINE 12/03/2019      The patient's Health Maintenance was reviewed and the following appears to be due at this time:   Health Maintenance Due   Topic Date Due    Hepatitis C Screening  Never done    Foot Exam  Never done    Shingles Vaccine (1 of 2) Never done    Colorectal Cancer Screening  Never done    Pneumococcal Vaccines (Age 65+) (2 - PCV) 10/26/2017    Low Dose Statin  07/10/2019    Eye Exam  09/16/2021    COVID-19 Vaccine (4 - Booster for Pfizer series) 10/13/2021    Hemoglobin A1c  03/28/2022    Influenza Vaccine (1) 09/01/2022    Lipid Panel  09/28/2022    Diabetes Urine Screening  09/28/2022         1. Wellness examination  Assessment & Plan:  Fasting labs ordered. Will call with results when available  Will request colonoscopy report from dr. alcala  Flu shot today    Will plan on pcv 20 NV in 2 weeks.     Orders:  -     CBC Auto Differential; Future; Expected date: 10/05/2022  -     Comprehensive Metabolic Panel; Future; Expected date: 10/05/2022  -     Lipid Panel; Future; Expected date: 10/05/2022  -     TSH; Future; Expected date: 10/05/2022  -     Hemoglobin A1C; Future; Expected date:  10/05/2022  -     Urinalysis; Future; Expected date: 10/05/2022  -     Hepatitis C Antibody; Future; Expected date: 10/05/2022  -     Microalbumin/Creatinine Ratio, Urine; Future; Expected date: 10/05/2022    2. Type 2 diabetes mellitus without complication, without long-term current use of insulin  Assessment & Plan:  Lab Results   Component Value Date    HGBA1C 7.0 09/28/2021     Urine micro ordered.   Foot exam today  Eye exam with dr. Mcleod.  Will request.     On metformin and statin and acei    Orders:  -     CBC Auto Differential; Future; Expected date: 10/05/2022  -     Comprehensive Metabolic Panel; Future; Expected date: 10/05/2022  -     Lipid Panel; Future; Expected date: 10/05/2022  -     TSH; Future; Expected date: 10/05/2022  -     Hemoglobin A1C; Future; Expected date: 10/05/2022  -     Urinalysis; Future; Expected date: 10/05/2022  -     Hepatitis C Antibody; Future; Expected date: 10/05/2022  -     Microalbumin/Creatinine Ratio, Urine; Future; Expected date: 10/05/2022    3. Hypertension associated with diabetes  Assessment & Plan:  Well controlled on current meds. On asa    Orders:  -     CBC Auto Differential; Future; Expected date: 10/05/2022  -     Comprehensive Metabolic Panel; Future; Expected date: 10/05/2022  -     Lipid Panel; Future; Expected date: 10/05/2022  -     TSH; Future; Expected date: 10/05/2022  -     Hemoglobin A1C; Future; Expected date: 10/05/2022  -     Urinalysis; Future; Expected date: 10/05/2022  -     Hepatitis C Antibody; Future; Expected date: 10/05/2022  -     Microalbumin/Creatinine Ratio, Urine; Future; Expected date: 10/05/2022    4. Hyperlipidemia associated with type 2 diabetes mellitus  Assessment & Plan:  On statin and fish oil    Orders:  -     CBC Auto Differential; Future; Expected date: 10/05/2022  -     Comprehensive Metabolic Panel; Future; Expected date: 10/05/2022  -     Lipid Panel; Future; Expected date: 10/05/2022  -     TSH; Future; Expected date:  10/05/2022  -     Hemoglobin A1C; Future; Expected date: 10/05/2022  -     Urinalysis; Future; Expected date: 10/05/2022  -     Hepatitis C Antibody; Future; Expected date: 10/05/2022  -     Microalbumin/Creatinine Ratio, Urine; Future; Expected date: 10/05/2022    5. Needs flu shot  Assessment & Plan:  Flu shot today    Orders:  -     CBC Auto Differential; Future; Expected date: 10/05/2022  -     Comprehensive Metabolic Panel; Future; Expected date: 10/05/2022  -     Lipid Panel; Future; Expected date: 10/05/2022  -     TSH; Future; Expected date: 10/05/2022  -     Hemoglobin A1C; Future; Expected date: 10/05/2022  -     Urinalysis; Future; Expected date: 10/05/2022  -     Hepatitis C Antibody; Future; Expected date: 10/05/2022  -     Microalbumin/Creatinine Ratio, Urine; Future; Expected date: 10/05/2022    6. Prostate cancer  Assessment & Plan:  Dx 2019.  S/p surgery. no radiation. On no meds.  Keep appts with dr. key    Orders:  -     CBC Auto Differential; Future; Expected date: 10/05/2022  -     Comprehensive Metabolic Panel; Future; Expected date: 10/05/2022  -     Lipid Panel; Future; Expected date: 10/05/2022  -     TSH; Future; Expected date: 10/05/2022  -     Hemoglobin A1C; Future; Expected date: 10/05/2022  -     Urinalysis; Future; Expected date: 10/05/2022  -     Hepatitis C Antibody; Future; Expected date: 10/05/2022  -     Microalbumin/Creatinine Ratio, Urine; Future; Expected date: 10/05/2022       Advance Care Planning   I attest to discussing Advance Care Planning with patient and/or family member.  Education was provided including the importance of the Health Care Power of , Advance Directives, and/or [place] documentation.  The patient expressed understanding to the importance of this information and discussion.  Length of ACP conversation in minutes: 16         Medication List with Changes/Refills   Current Medications    ASPIRIN (ECOTRIN) 81 MG EC TABLET    Take 81 mg by mouth once  daily.       Start Date: --        End Date: --    ATORVASTATIN (LIPITOR) 20 MG TABLET      See Instructions, TAKE 1 TABLET BY MOUTH EVERY DAY, # 90 tab(s), 3 Refill(s), Pharmacy: [facility]/pharmacy #[id number]       Start Date: 7/10/2018 End Date: --    CALCIUM CARBONATE/VITAMIN D3 (VITAMIN D-3 ORAL)    Take 2,000 Int'l Units by mouth.       Start Date: 8/17/2020 End Date: --    CARVEDILOL (COREG) 6.25 MG TABLET      See Instructions, TAKE 1 TABLET BY MOUTH TWICE A DAY, # 180 tab(s), 3 Refill(s), Pharmacy: [facility]/pharmacy #[id number]       Start Date: 7/23/2018 End Date: --    LISINOPRIL (PRINIVIL,ZESTRIL) 20 MG TABLET      See Instructions, TAKE 1 TABLET BY MOUTH EVERY DAY, # 90 tab(s), 3 Refill(s), [id number]       Start Date: 7/10/2018 End Date: --    METFORMIN (GLUCOPHAGE) 500 MG TABLET    Take 500 mg by mouth 2 (two) times a day.       Start Date: 7/23/2018 End Date: --    MULTIVITAMIN (THERAGRAN) PER TABLET    Take 1 tablet by mouth every evening.       Start Date: --        End Date: --    OMEGA-3-DHA-EPA-FISH OIL ORAL    Take 1,000 mg by mouth.       Start Date: 8/17/2020 End Date: --    ZINC 50 MG TAB      0 Refill(s)       Start Date: 8/17/2020 End Date: --   Discontinued Medications    FREESTYLE COLBY 14 DAY SENSOR KIT    USE AS DIRECTED       Start Date: 6/25/2020 End Date: 10/5/2022    TADALAFIL (CIALIS) 20 MG TAB    1 PO QOD       Start Date: 10/19/2020End Date: 10/5/2022        Follow up in about 1 year (around 10/5/2023) for Medicare Wellness with labs. In addition to their scheduled follow up, the patient has also been instructed to follow up on as needed basis.

## 2022-10-14 ENCOUNTER — DOCUMENTATION ONLY (OUTPATIENT)
Dept: FAMILY MEDICINE | Facility: CLINIC | Age: 67
End: 2022-10-14
Payer: MEDICARE

## 2022-11-28 ENCOUNTER — DOCUMENTATION ONLY (OUTPATIENT)
Dept: ADMINISTRATIVE | Facility: HOSPITAL | Age: 67
End: 2022-11-28
Payer: MEDICARE

## 2022-12-01 ENCOUNTER — DOCUMENTATION ONLY (OUTPATIENT)
Dept: FAMILY MEDICINE | Facility: CLINIC | Age: 67
End: 2022-12-01
Payer: MEDICARE

## 2022-12-13 LAB
FINAL PATHOLOGIC DIAGNOSIS: NORMAL
GROSS: NORMAL
SUPPLEMENTAL DIAGNOSIS: NORMAL

## 2023-01-09 PROBLEM — Z00.00 WELLNESS EXAMINATION: Status: RESOLVED | Noted: 2022-10-05 | Resolved: 2023-01-09

## 2023-04-14 RX ORDER — ATORVASTATIN CALCIUM 20 MG/1
20 TABLET, FILM COATED ORAL DAILY
Qty: 90 TABLET | Refills: 3 | Status: SHIPPED | OUTPATIENT
Start: 2023-04-14 | End: 2023-04-24 | Stop reason: SDUPTHER

## 2023-04-14 NOTE — TELEPHONE ENCOUNTER
----- Message from Sanam Duncan sent at 4/14/2023 11:13 AM CDT -----  Regarding: refill  .Type:  RX Refill Request    Who Called: dania Powers or New Rx:refill  RX Name and Strength:atorvastatin (LIPITOR) 20  How is the patient currently taking it? (ex. 1XDay):1xday  Is this a 30 day or 90 day RX:90 DAY  Preferred Pharmacy with phone number: [facility]   Local or Mail Order:local  Ordering Provider:  Would the patient rather a call back or a response via [facility]?   Best Call Back Number:[phone]  Additional Information:

## 2023-04-24 RX ORDER — ATORVASTATIN CALCIUM 20 MG/1
20 TABLET, FILM COATED ORAL DAILY
Qty: 90 TABLET | Refills: 1 | Status: SHIPPED | OUTPATIENT
Start: 2023-04-24 | End: 2024-04-03 | Stop reason: SDUPTHER

## 2023-04-24 RX ORDER — LISINOPRIL 20 MG/1
20 TABLET ORAL DAILY
Qty: 90 TABLET | Refills: 3 | Status: SHIPPED | OUTPATIENT
Start: 2023-04-24

## 2023-04-24 RX ORDER — CARVEDILOL 6.25 MG/1
6.25 TABLET ORAL 2 TIMES DAILY
Qty: 180 TABLET | Refills: 3 | Status: SHIPPED | OUTPATIENT
Start: 2023-04-24

## 2023-04-24 RX ORDER — METFORMIN HYDROCHLORIDE 500 MG/1
500 TABLET ORAL 2 TIMES DAILY WITH MEALS
Qty: 180 TABLET | Refills: 3 | Status: SHIPPED | OUTPATIENT
Start: 2023-04-24

## 2023-04-24 NOTE — TELEPHONE ENCOUNTER
----- Message from April Stewart sent at 4/21/2023 12:05 PM CDT -----  Regarding: med refill  .Type:  RX Refill Request    Who Called:  patient  Refill or New Rx: refill  RX Name and Strength: atorvastatin (LIPITOR) 20 MG tablet  How is the patient currently taking it? (ex. 1XDay): 1x daily  Is this a 30 day or 90 day RX: 90  [id number] [place] [facility] [address] AT [facility]  Local or Mail Order: local  Ordering Provider: Mildred  Would the patient rather a call back or a response via [facility]?  Call back  Best Call Back Number: [phone]  Additional Information: patient would like a refill on these medications.       .Type:  RX Refill Request    Who Called:  patient  Refill or New Rx: refill  RX Name and Strength: carvediloL (COREG) 6.25 MG tablet  How is the patient currently taking it? (ex. 1XDay): 2x daily  Is this a 30 day or 90 day RX: 180  Preferred Pharmacy with phone number: [id number] [place] - [address] AT [facility]  Local or Mail Order: local  Ordering Provider: Mildred  Would the patient rather a call back or a response via [facility]?  Call back  Best Call Back Number: [phone]  Additional Information: patient would like a refill on these medications.       .Type:  RX Refill Request    Who Called:  patient  Refill or New Rx: refill  RX Name and Strength: lisinopril (PRINIVIL,ZESTRIL) 20 MG tablet  How is the patient currently taking it? (ex. 1XDay): 1x daily  Is this a 30 day or 90 day RX: 90  [facility]erred Pharmacy with phone number: [id number] [place] - [address] AT [facility]  Local or Mail Order: local  Ordering Provider: Mildred  Would the patient rather a call back or a response via [facility]?  Call back  Best Call Back Number: [phone]  Additional Information: patient would like a refill on these medications.     .Type:  RX Refill  Request    Who Called:  patient  Refill or New Rx: refill  RX Name and Strength: metFORMIN (GLUCOPHAGE) 500 MG tablet  How is the patient currently taking it? (ex. 1XDay): 2x daily  Is this a 30 day or 90 day RX:   Preferred Pharmacy with phone number: [id number] - [place] - [address] AT [facility]  Local or Mail Order: local  Ordering Provider: Mildred  Would the patient rather a call back or a response via [facility]?  Call back  Best Call Back Number: [phone]  Additional Information: patient would like a refill on these medications.

## 2023-05-25 ENCOUNTER — TELEPHONE (OUTPATIENT)
Dept: FAMILY MEDICINE | Facility: CLINIC | Age: 68
End: 2023-05-25
Payer: MEDICARE

## 2023-05-25 DIAGNOSIS — I15.2 HYPERTENSION ASSOCIATED WITH DIABETES: Primary | ICD-10-CM

## 2023-05-25 DIAGNOSIS — E11.59 HYPERTENSION ASSOCIATED WITH DIABETES: Primary | ICD-10-CM

## 2023-05-25 DIAGNOSIS — E11.69 HYPERLIPIDEMIA ASSOCIATED WITH TYPE 2 DIABETES MELLITUS: ICD-10-CM

## 2023-05-25 DIAGNOSIS — R00.1 SINUS BRADYCARDIA: ICD-10-CM

## 2023-05-25 DIAGNOSIS — E78.5 HYPERLIPIDEMIA ASSOCIATED WITH TYPE 2 DIABETES MELLITUS: ICD-10-CM

## 2023-05-25 NOTE — TELEPHONE ENCOUNTER
I have signed for the following orders AND/OR meds.  Please call the patient and ask the patient to schedule the testing AND/OR inform about any medications that were sent.      Orders Placed This Encounter   Procedures    Ambulatory referral/consult to Cardiology     Standing Status:   Future     Standing Expiration Date:   6/25/2024     Referral Priority:   Routine     Referral Type:   Consultation     Referral Reason:   Specialty Services Required     Referred to Provider:   Ti Mendenhall MD     Requested Specialty:   Cardiology     Number of Visits Requested:   1

## 2023-05-25 NOTE — TELEPHONE ENCOUNTER
----- Message from Kelly Banks sent at 5/25/2023 11:57 AM CDT -----  Regarding: Referral  .Type:  Needs Medical Advice    Who Called: Pt  Symptoms (please be specific):    How long has patient had these symptoms:    Pharmacy name and phone #:    Would the patient rather a call back or a response via [facility]? Call back  Best Call Back Number: [phone]  Additional Information: Requesting a referral to a cardiologist to Cardiologist specialty of [facility] with Dr Mendenhall.

## 2023-08-26 ENCOUNTER — OFFICE VISIT (OUTPATIENT)
Dept: URGENT CARE | Facility: CLINIC | Age: 68
End: 2023-08-26
Payer: MEDICARE

## 2023-08-26 VITALS
WEIGHT: 208 LBS | RESPIRATION RATE: 20 BRPM | HEART RATE: 68 BPM | BODY MASS INDEX: 29.78 KG/M2 | SYSTOLIC BLOOD PRESSURE: 117 MMHG | TEMPERATURE: 100 F | DIASTOLIC BLOOD PRESSURE: 75 MMHG | OXYGEN SATURATION: 97 % | HEIGHT: 70 IN

## 2023-08-26 DIAGNOSIS — R09.81 CONGESTION OF NASAL SINUS: ICD-10-CM

## 2023-08-26 DIAGNOSIS — U07.1 COVID: Primary | ICD-10-CM

## 2023-08-26 DIAGNOSIS — U07.1 COVID-19 VIRUS DETECTED: ICD-10-CM

## 2023-08-26 LAB
CTP QC/QA: YES
SARS-COV-2 RDRP RESP QL NAA+PROBE: POSITIVE

## 2023-08-26 PROCEDURE — 87635: ICD-10-PCS | Mod: QW,,, | Performed by: FAMILY MEDICINE

## 2023-08-26 PROCEDURE — 99214 OFFICE O/P EST MOD 30 MIN: CPT | Mod: ,,, | Performed by: FAMILY MEDICINE

## 2023-08-26 PROCEDURE — 87635 SARS-COV-2 COVID-19 AMP PRB: CPT | Mod: QW,,, | Performed by: FAMILY MEDICINE

## 2023-08-26 PROCEDURE — 99214 PR OFFICE/OUTPT VISIT, EST, LEVL IV, 30-39 MIN: ICD-10-PCS | Mod: ,,, | Performed by: FAMILY MEDICINE

## 2023-08-26 NOTE — PATIENT INSTRUCTIONS
Vitamin D and Zinc.  Force fluids.  Paxlovid as directed.  Hold statin as directed.  Home quarantine for 5 days from symptom onset.  If symptoms have improved and 48 hours fever free can stop home quarantine on day 6 and wear a mask when around others for additional 5 days.  Contagious up to 10 days and fever free.   ER for severe worsening.

## 2023-08-26 NOTE — PROGRESS NOTES
"Subjective:      Patient ID: Zohaib Moon is a 68 y.o. male.    Vitals:  height is 5' 10" (1.778 m) and weight is 94.3 kg (208 lb). His temperature is 99.6 °F (37.6 °C). His blood pressure is 117/75 and his pulse is 68. His respiration is 20 and oxygen saturation is 97%.     Chief Complaint: Headache (Started 2 days ago, body aches, congestion, chills. Wife tested positive 08/23 )    2 days of cough, congestion, elevated temp. Exposed to COVID.         Constitution: Negative for fever.   HENT:  Positive for congestion, postnasal drip, sinus pressure and sore throat.    Cardiovascular:  Negative for chest pain and palpitations.   Respiratory:  Positive for cough. Negative for chest tightness and shortness of breath.    Gastrointestinal:  Negative for nausea and vomiting.      Objective:     Physical Exam   Constitutional: He is oriented to person, place, and time. He appears well-developed.  Non-toxic appearance. No distress.   HENT:   Head: Normocephalic.   Ears:   Right Ear: Tympanic membrane and external ear normal.   Left Ear: Tympanic membrane and external ear normal.   Nose: Rhinorrhea present.   Mouth/Throat: Uvula is midline and mucous membranes are normal. No uvula swelling. Posterior oropharyngeal erythema and cobblestoning present. No oropharyngeal exudate or posterior oropharyngeal edema. Tonsils are 0 on the right. Tonsils are 0 on the left. No tonsillar exudate.   Eyes: Pupils are equal, round, and reactive to light. Right eye exhibits no discharge. Left eye exhibits no discharge.   Neck: Neck supple. No tracheal deviation present.   Cardiovascular: Normal rate, regular rhythm and normal heart sounds.   No murmur heard.  Pulmonary/Chest: Effort normal and breath sounds normal. No stridor. No respiratory distress. He has no wheezes.   Lymphadenopathy:     He has no cervical adenopathy.   Neurological: no focal deficit. He is alert and oriented to person, place, and time.   Skin: Skin is warm and dry. "   Psychiatric: Mood and thought content normal.   Nursing note and vitals reviewed.      Assessment:     1. COVID    2. Congestion of nasal sinus        Plan:       COVID  -     nirmatrelvir-ritonavir 300 mg (150 mg x 2)-100 mg copackaged tablets (EUA); Take 3 tablets by mouth 2 (two) times daily for 5 days. Each dose contains 2 nirmatrelvir (pink tablets) and 1 ritonavir (white tablet). Take all 3 tablets together  Dispense: 30 tablet; Refill: 0    Congestion of nasal sinus  -     POCT COVID-19 Rapid Screening           COVID positive.

## 2023-09-07 ENCOUNTER — PATIENT MESSAGE (OUTPATIENT)
Dept: RESEARCH | Facility: HOSPITAL | Age: 68
End: 2023-09-07
Payer: MEDICARE

## 2023-10-05 PROBLEM — Z12.11 COLON CANCER SCREENING: Status: ACTIVE | Noted: 2023-10-05

## 2023-10-05 PROBLEM — Z71.89 ADVANCED CARE PLANNING/COUNSELING DISCUSSION: Status: ACTIVE | Noted: 2023-10-05

## 2023-10-05 NOTE — PROGRESS NOTES
Patient ID: [id number]     Chief Complaint: Medicare AWV (Wellness )      HPI:     Zohaib Moon is a 68 y.o. male here today for a Medicare Wellness. No other complaints today.     presents to the clinic unaccompanied for his wellness visit. he is due for labs.       The patient has elevated PSA. He was seeing Dr. Hernandez in [place]. he was diagnosed with prostate cancer 2019. Had prostate removal with dr. rambo adair at [facility] robotic surgery. no radiation. He sees dr. Cannon locally.  He checks his psa. Saw him about 1 month ago.  Has appt 11/2023.     Patient has diabetes. He has not been checking his blood sugars however he reports compliance with his medication (metformin 500mg bid). His last foot exam was done today. urine micro 10/2022. He had his eye exam done at Dr. Duran. A1c 7.3 10/2022.      He has hypertension and hyperlipidemia. He is compliant with his medications. He is on asa.  Not seeing cards locally.     He is obese. has been riding bike. doing weights.     He is not ALLERGIC to any medications. He drinks alcohol rarely. He does not smoke. He had a colonoscopy done with Dr. Ivory 3/2017 and 11/2021. Repeat due in 5 years.      Declines immunizations today.            Past Surgical History:   Procedure Laterality Date    APPENDECTOMY      LIPOMA RESECTION  2018    ROBOT-ASSISTED LAPAROSCOPIC PELVIC LYMPHADENECTOMY USING DA AISHA XI  5/6/2020    Procedure: XI ROBOTIC LYMPHADENECTOMY, PELVIC;  Surgeon: Ti Maier MD;  Location: [facility] OR [address] [place] [address];  Service: Urology;;    ROBOT-ASSISTED LAPAROSCOPIC PROSTATECTOMY USING DA AISHA XI N/A 5/6/2020    Procedure: XI ROBOTIC PROSTATECTOMY;  Surgeon: Ti Maier MD;  Location: [facility] OR [address] [place] [address];  Service: Urology;  Laterality: N/A;  3hrs gen with regional      TRUS Bx  01/15/2020    VASECTOMY         Review of patient's allergies indicates:  No Known Allergies    Outpatient Medications Marked as Taking for the 10/6/23 encounter (Office  Visit) with Yaritza Levy MD   Medication Sig Dispense Refill    aspirin (ECOTRIN) 81 MG EC tablet Take 81 mg by mouth once daily.      atorvastatin (LIPITOR) 20 MG tablet Take 1 tablet (20 mg total) by mouth once daily. 90 tablet 1    calcium carbonate/vitamin D3 (VITAMIN D-3 ORAL) Take 2,000 Int'l Units by mouth.      carvediloL (COREG) 6.25 MG tablet Take 1 tablet (6.25 mg total) by mouth 2 (two) times daily. 180 tablet 3    lisinopriL (PRINIVIL,ZESTRIL) 20 MG tablet Take 1 tablet (20 mg total) by mouth once daily. 90 tablet 3    metFORMIN (GLUCOPHAGE) 500 MG tablet Take 1 tablet (500 mg total) by mouth 2 (two) times daily with meals. 180 tablet 3    multivitamin (THERAGRAN) per tablet Take 1 tablet by mouth every evening.      OMEGA-3-DHA-EPA-FISH OIL ORAL Take 1,000 mg by mouth.      tadalafiL (CIALIS) 10 MG tablet Take 10 mg by mouth daily as needed for Erectile Dysfunction.      zinc 50 mg Tab   0 Refill(s)         Social History     Socioeconomic History    Marital status:      Spouse name: Shilpa    Number of children: 2    Years of education: 16    Highest education level: Bachelor's degree (e.g., BA, AB, BS)   Tobacco Use    Smoking status: Never    Smokeless tobacco: Never   Substance and Sexual Activity    Alcohol use: Yes     Alcohol/week: 8.0 standard drinks of alcohol     Types: 8 Shots of liquor per week     Comment: 3-4 drinks a week    Drug use: Never    Sexual activity: Yes     Partners: Female     Social Determinants of Health     Financial Resource Strain: Unknown (2/11/2020)    Overall Financial Resource Strain (CARDIA)     Difficulty of Paying Living Expenses: Patient refused   Food Insecurity: Unknown (2/11/2020)    Hunger Vital Sign     Worried About Running Out of Food in the Last Year: Patient refused     Ran Out of Food in the Last Year: Patient refused   Transportation Needs: Unknown (2/11/2020)    PRAPARE - Transportation     Lack of Transportation  (Medical): Patient refused     Lack of Transportation (Non-Medical): Patient refused   Physical Activity: Insufficiently Active (2/11/2020)    Exercise Vital Sign     Days of Exercise per Week: 2 days     Minutes of Exercise per Session: 20 min   Stress: No Stress Concern Present (2/11/2020)    [place] [employer] - Occupational Stress Questionnaire     Feeling of Stress : Only a little   Social Connections: Unknown (2/11/2020)    Social Connection and Isolation Panel [NHANES]     Frequency of Communication with Friends and Family: Patient refused     Frequency of Social Gatherings with Friends and Family: Patient refused     Attends Yarsanism Services: Patient refused     Active Member of Clubs or Organizations: Patient refused     Attends Club or Organization Meetings: Patient refused     Marital Status: Patient refused        Family History   Problem Relation Age of Onset    Heart disease Father     Kidney disease Father     Diabetes Father     Heart disease Mother         Patient Care Team:  Yaritza Levy MD as PCP - General (Family Medicine)  Avila Mcleod MD as Consulting Physician (Ophthalmology)  Marcelino Ivory MD as Consulting Physician (Gastroenterology)  Franklin Cannon MD as Consulting Physician (Urology)       Subjective:     Review of Systems   Constitutional: Negative.    HENT: Negative.     Eyes: Negative.    Respiratory: Negative.     Cardiovascular: Negative.    Gastrointestinal: Negative.    Genitourinary: Negative.    Musculoskeletal: Negative.    Skin: Negative.    Neurological: Negative.    Endo/Heme/Allergies: Negative.    Psychiatric/Behavioral: Negative.           Patient Reported Health Risk Assessment  What [age] your [age] 65-69  Are you male or female?: Male  During the past four weeks, how much have you been bothered by emotional problems such as feeling anxious, depressed, irritable, sad, or downhearted and blue?: Not at all  During  the past five weeks, has your physical and/or emotional health limited your social activities with family, friends, neighbors, or groups?: Not at all  During the past four weeks, how much bodily pain have you generally had?: Moderate pain  During the past four weeks, was someone available to help if you needed and wanted help?: Yes, as much as I wanted  During the past four weeks, what was the hardest physical activity you could do for at least two minutes?: Light  Can you get to places out of walking distance without help?  (For example, can you travel alone on buses or taxis, or drive your own car?): Yes  Can you go shopping for groceries or clothes without someone's help?: Yes  Can you prepare your own meals?: Yes  Can you do your own housework without help?: Yes  Because of any health problems, do you need the help of another person with your personal care needs such as eating, bathing, dressing, or getting around the house?: No  Can you handle your own money without help?: Yes  During the past four weeks, how would you rate your health in general?: Good  How have things been going for you during the past four weeks?: Good and bad parts about equal  Are you having difficulties driving your car?: No  Do you always fasten your seat belt when you are in a car?: Yes, usually  How often in the past four weeks have you been bothered by falling or dizzy when standing up?: Never  How often in the past four weeks have you been bothered by sexual problems?: Never  How often in the past four weeks have you been bothered by trouble eating well?: Never  How often in the past four weeks have you been bothered by teeth or denture problems?: Never  How often in the past four weeks have you been bothered with problems using the telephone?: Never  How often in the past four weeks have you been bothered by tiredness or fatigue?: Never  Have you fallen two or more times in the past year?: No  Are you afraid of falling?: No  Are you  "a smoker?: Yes, I'm not ready to quit  During the past four weeks, how many drinks of wine, beer, or other alcoholic beverages did you have?: 2-5 drinks per weeks  Do you exercise for about 20 minutes three or more days a week?: Yes, some of the time  Have you been given any information to help you with hazards in your house that might hurt you?: No  Have you been given any information to help you with keeping track of your medications?: No  How often do you have trouble taking medicines the way you've been told to take them?: I always take them as prescribed  How confident are you that you can control and manage most of your health problems?: Very confident  What is your race? (Check all that apply.):     Objective:     /84 (BP Location: Left arm)   Pulse 63   Temp 98.2 °F (36.8 °C) (Temporal)   Resp 16   Ht 5' 10" (1.778 m)   Wt 96.1 kg (211 lb 12.8 oz)   SpO2 98%   BMI 30.39 kg/m²     Physical Exam  Vitals and nursing note reviewed.   Constitutional:       Appearance: Normal appearance. He is obese.   HENT:      Head: Normocephalic.      Nose: Nose normal.      Mouth/Throat:      Mouth: Mucous membranes are moist.      Pharynx: Oropharynx is clear.   Eyes:      Extraocular Movements: Extraocular movements intact.   Cardiovascular:      Rate and Rhythm: Normal rate and regular rhythm.      Pulses:           Dorsalis pedis pulses are 2+ on the right side and 2+ on the left side.        Posterior tibial pulses are 2+ on the right side and 2+ on the left side.   Pulmonary:      Effort: Pulmonary effort is normal.      Breath sounds: Normal breath sounds.   Musculoskeletal:         General: Normal range of motion.        Feet:    Feet:      Right foot:      Protective Sensation: 8 sites tested.  8 sites sensed.      Skin integrity: Skin integrity normal.      Left foot:      Protective Sensation: 8 sites tested.  8 sites sensed.      Skin integrity: Skin integrity normal.   Skin:     General: " Skin is warm and dry.   Neurological:      General: No focal deficit present.      Mental Status: He is alert and oriented to person, place, and time. Mental status is at baseline.   Psychiatric:         Mood and Affect: Mood normal.              No data to display                  10/6/2023     8:30 AM 8/26/2023    10:40 AM 10/5/2022     8:30 AM 10/19/2020    10:15 AM 7/16/2020     9:30 AM 6/23/2020     1:30 PM 2/11/2020     1:00 PM   Fall Risk Assessment - Outpatient   Mobility Status Ambulatory Ambulatory Ambulatory Ambulatory Ambulatory Ambulatory    Number of falls 0 0 0 0 0 0 1 with injury   Identified as fall risk False False False False False  False           Depression Screening  Over the past two weeks, has the patient felt down, depressed, or hopeless?: No  Over the past two weeks, has the patient felt little interest or pleasure in doing things?: No  Functional Ability/Safety Screening  Was the patient's timed Up & Go test unsteady or longer than 30 seconds?: No  Does the patient need help with phone, transportation, shopping, preparing meals, housework, laundry, meds, or managing money?: No  Does the patient's home have rugs in the hallway, lack grab bars in the bathroom, lack handrails on the stairs or have poor lighting?: No  Have you noticed any hearing difficulties?: No  Cognitive Function (Assessed through direct observation with due consideration of information obtained by way of patient reports and/or concerns raised by family, friends, caretakers, or others)    Does the patient repeat questions/statements in the same day?: No  Does the patient have trouble remembering the date, year, and time?: No  Does the patient have difficulty managing finances?: No  Does the patient have a decreased sense of direction?: No  Assessment/Plan:       Medicare Annual Wellness and Personalized Prevention Plan:   Fall Risk + Home Safety + Hearing Impairment + Depression Screen + Cognitive Impairment Screen +  Health Risk Assessment all reviewed.     Opioid Screening: Patient medication list reviewed, patient is not taking prescription opioids. Patient is not using additional opioids than prescribed. Patient is at low risk of substance abuse based on this opioid use history.         Health Maintenance Topics with due status: Not Due       Topic Last Completion Date    TETANUS VACCINE 12/03/2019    Colorectal Cancer Screening 11/29/2021    Low Dose Statin 10/06/2023    Diabetes Urine Screening 10/06/2023    Lipid Panel 10/06/2023    Hemoglobin A1c 10/06/2023      The patient's Health Maintenance was reviewed and the following appears to be due at this time:   Health Maintenance Due   Topic Date Due    Influenza Vaccine (1) 09/01/2023    COVID-19 Vaccine (5 - 2023-24 season) 09/01/2023    Eye Exam  09/23/2023    Foot Exam  10/05/2023         1. Wellness examination  Assessment & Plan:  Fasting labs ordered. Will call with results when available  psa with urology  Colonoscopy with dr. alcala 11/2021 with repeat due in 5 years    Advanced care planning discussed and paperwork given.     Orders:  -     CBC Auto Differential; Future; Expected date: 10/06/2023  -     Comprehensive Metabolic Panel; Future; Expected date: 10/06/2023  -     Lipid Panel; Future; Expected date: 10/06/2023  -     TSH; Future; Expected date: 10/06/2023  -     Hemoglobin A1C; Future; Expected date: 10/06/2023  -     Urinalysis; Future; Expected date: 10/06/2023  -     Microalbumin/Creatinine Ratio, Urine; Future; Expected date: 10/06/2023    2. Advanced care planning/counseling discussion  Assessment & Plan:  Advanced care planning discussed and paperwork given.    I attest that I have had a face to face discussion with patient and or surrogate decision maker.   Included surrogate decision maker: NO  Advanced directive in chart: NO  LAPOST: NO    Total time spent: 16 minutes      Orders:  -     CBC Auto Differential; Future; Expected date:  10/06/2023  -     Comprehensive Metabolic Panel; Future; Expected date: 10/06/2023  -     Lipid Panel; Future; Expected date: 10/06/2023  -     TSH; Future; Expected date: 10/06/2023  -     Hemoglobin A1C; Future; Expected date: 10/06/2023  -     Urinalysis; Future; Expected date: 10/06/2023  -     Microalbumin/Creatinine Ratio, Urine; Future; Expected date: 10/06/2023    3. Type 2 diabetes mellitus without complication, without long-term current use of insulin  Assessment & Plan:  Lab Results   Component Value Date    HGBA1C 7.3 (H) 10/05/2022     Urine micro 10/2022.   Foot exam today  Eye exam with dr. Mcleod today    On metformin and statin and acei    Orders:  -     CBC Auto Differential; Future; Expected date: 10/06/2023  -     Comprehensive Metabolic Panel; Future; Expected date: 10/06/2023  -     Lipid Panel; Future; Expected date: 10/06/2023  -     TSH; Future; Expected date: 10/06/2023  -     Hemoglobin A1C; Future; Expected date: 10/06/2023  -     Urinalysis; Future; Expected date: 10/06/2023  -     Microalbumin/Creatinine Ratio, Urine; Future; Expected date: 10/06/2023    4. Hyperlipidemia associated with type 2 diabetes mellitus  Assessment & Plan:  On statin and fish oil    Orders:  -     CBC Auto Differential; Future; Expected date: 10/06/2023  -     Comprehensive Metabolic Panel; Future; Expected date: 10/06/2023  -     Lipid Panel; Future; Expected date: 10/06/2023  -     TSH; Future; Expected date: 10/06/2023  -     Hemoglobin A1C; Future; Expected date: 10/06/2023  -     Urinalysis; Future; Expected date: 10/06/2023  -     Microalbumin/Creatinine Ratio, Urine; Future; Expected date: 10/06/2023    5. Hypertension associated with diabetes  Assessment & Plan:  Well controlled on current meds. On asa    Orders:  -     CBC Auto Differential; Future; Expected date: 10/06/2023  -     Comprehensive Metabolic Panel; Future; Expected date: 10/06/2023  -     Lipid Panel; Future; Expected date: 10/06/2023  -      TSH; Future; Expected date: 10/06/2023  -     Hemoglobin A1C; Future; Expected date: 10/06/2023  -     Urinalysis; Future; Expected date: 10/06/2023  -     Microalbumin/Creatinine Ratio, Urine; Future; Expected date: 10/06/2023    6. Prostate cancer  Assessment & Plan:  Dx 2019.  S/p surgery. no radiation. On no meds.  Keep appts with dr. Cannon. He orders his psa    Orders:  -     CBC Auto Differential; Future; Expected date: 10/06/2023  -     Comprehensive Metabolic Panel; Future; Expected date: 10/06/2023  -     Lipid Panel; Future; Expected date: 10/06/2023  -     TSH; Future; Expected date: 10/06/2023  -     Hemoglobin A1C; Future; Expected date: 10/06/2023  -     Urinalysis; Future; Expected date: 10/06/2023  -     Microalbumin/Creatinine Ratio, Urine; Future; Expected date: 10/06/2023    7. Colon cancer screening  Assessment & Plan:  Colonoscopy 11/2021 with dr. alcala with repeat due in 5 years    Orders:  -     CBC Auto Differential; Future; Expected date: 10/06/2023  -     Comprehensive Metabolic Panel; Future; Expected date: 10/06/2023  -     Lipid Panel; Future; Expected date: 10/06/2023  -     TSH; Future; Expected date: 10/06/2023  -     Hemoglobin A1C; Future; Expected date: 10/06/2023  -     Urinalysis; Future; Expected date: 10/06/2023  -     Microalbumin/Creatinine Ratio, Urine; Future; Expected date: 10/06/2023    8. Class 1 obesity due to excess calories with serious comorbidity and body mass index (BMI) of 30.0 to 30.9 in adult  Assessment & Plan:  Encourage lifestyle change           Advance Care Planning   I attest to discussing Advance Care Planning with patient and/or family member.  Education was provided including the importance of the Health Care Power of , Advance Directives, and/or [place] documentation.  The patient expressed understanding to the importance of this information and discussion.  Length of ACP conversation in minutes: 16         Medication List with  Changes/Refills   Current Medications    ASPIRIN (ECOTRIN) 81 MG EC TABLET    Take 81 mg by mouth once daily.       Start Date: --        End Date: --    ATORVASTATIN (LIPITOR) 20 MG TABLET    Take 1 tablet (20 mg total) by mouth once daily.       Start Date: 4/24/2023 End Date: --    CALCIUM CARBONATE/VITAMIN D3 (VITAMIN D-3 ORAL)    Take 2,000 Int'l Units by mouth.       Start Date: 8/17/2020 End Date: --    CARVEDILOL (COREG) 6.25 MG TABLET    Take 1 tablet (6.25 mg total) by mouth 2 (two) times daily.       Start Date: 4/24/2023 End Date: --    LISINOPRIL (PRINIVIL,ZESTRIL) 20 MG TABLET    Take 1 tablet (20 mg total) by mouth once daily.       Start Date: 4/24/2023 End Date: --    METFORMIN (GLUCOPHAGE) 500 MG TABLET    Take 1 tablet (500 mg total) by mouth 2 (two) times daily with meals.       Start Date: 4/24/2023 End Date: --    MULTIVITAMIN (THERAGRAN) PER TABLET    Take 1 tablet by mouth every evening.       Start Date: --        End Date: --    OMEGA-3-DHA-EPA-FISH OIL ORAL    Take 1,000 mg by mouth.       Start Date: 8/17/2020 End Date: --    TADALAFIL (CIALIS) 10 MG TABLET    Take 10 mg by mouth daily as needed for Erectile Dysfunction.       Start Date: --        End Date: --    ZINC 50 MG TAB      0 Refill(s)       Start Date: 8/17/2020 End Date: --        Follow up in about 1 year (around 10/6/2024) for Medicare Wellness with labs. In addition to their scheduled follow up, the patient has also been instructed to follow up on as needed basis.

## 2023-10-06 ENCOUNTER — LAB VISIT (OUTPATIENT)
Dept: LAB | Facility: HOSPITAL | Age: 68
End: 2023-10-06
Attending: FAMILY MEDICINE
Payer: MEDICARE

## 2023-10-06 ENCOUNTER — OFFICE VISIT (OUTPATIENT)
Dept: FAMILY MEDICINE | Facility: CLINIC | Age: 68
End: 2023-10-06
Payer: MEDICARE

## 2023-10-06 VITALS
TEMPERATURE: 98 F | HEART RATE: 63 BPM | RESPIRATION RATE: 16 BRPM | BODY MASS INDEX: 30.32 KG/M2 | HEIGHT: 70 IN | DIASTOLIC BLOOD PRESSURE: 84 MMHG | SYSTOLIC BLOOD PRESSURE: 122 MMHG | WEIGHT: 211.81 LBS | OXYGEN SATURATION: 98 %

## 2023-10-06 DIAGNOSIS — Z12.11 COLON CANCER SCREENING: ICD-10-CM

## 2023-10-06 DIAGNOSIS — E11.59 HYPERTENSION ASSOCIATED WITH DIABETES: ICD-10-CM

## 2023-10-06 DIAGNOSIS — C61 PROSTATE CANCER: ICD-10-CM

## 2023-10-06 DIAGNOSIS — Z00.00 WELLNESS EXAMINATION: Primary | ICD-10-CM

## 2023-10-06 DIAGNOSIS — I15.2 HYPERTENSION ASSOCIATED WITH DIABETES: ICD-10-CM

## 2023-10-06 DIAGNOSIS — E11.9 TYPE 2 DIABETES MELLITUS WITHOUT COMPLICATION, WITHOUT LONG-TERM CURRENT USE OF INSULIN: ICD-10-CM

## 2023-10-06 DIAGNOSIS — E78.5 HYPERLIPIDEMIA ASSOCIATED WITH TYPE 2 DIABETES MELLITUS: ICD-10-CM

## 2023-10-06 DIAGNOSIS — E66.09 CLASS 1 OBESITY DUE TO EXCESS CALORIES WITH SERIOUS COMORBIDITY AND BODY MASS INDEX (BMI) OF 30.0 TO 30.9 IN ADULT: ICD-10-CM

## 2023-10-06 DIAGNOSIS — Z00.00 WELLNESS EXAMINATION: ICD-10-CM

## 2023-10-06 DIAGNOSIS — E11.69 HYPERLIPIDEMIA ASSOCIATED WITH TYPE 2 DIABETES MELLITUS: ICD-10-CM

## 2023-10-06 DIAGNOSIS — Z71.89 ADVANCED CARE PLANNING/COUNSELING DISCUSSION: ICD-10-CM

## 2023-10-06 LAB
ALBUMIN SERPL-MCNC: 3.9 G/DL (ref 3.4–4.8)
ALBUMIN/GLOB SERPL: 1.1 RATIO (ref 1.1–2)
ALP SERPL-CCNC: 58 UNIT/L (ref 40–150)
ALT SERPL-CCNC: 20 UNIT/L (ref 0–55)
APPEARANCE UR: CLEAR
AST SERPL-CCNC: 16 UNIT/L (ref 5–34)
BASOPHILS # BLD AUTO: 0.06 X10(3)/MCL
BASOPHILS NFR BLD AUTO: 1.1 %
BILIRUB SERPL-MCNC: 1 MG/DL
BILIRUB UR QL STRIP.AUTO: NEGATIVE
BUN SERPL-MCNC: 22.7 MG/DL (ref 8.4–25.7)
CALCIUM SERPL-MCNC: 10.2 MG/DL (ref 8.8–10)
CHLORIDE SERPL-SCNC: 105 MMOL/L (ref 98–107)
CHOLEST SERPL-MCNC: 184 MG/DL
CHOLEST/HDLC SERPL: 4 {RATIO} (ref 0–5)
CO2 SERPL-SCNC: 30 MMOL/L (ref 23–31)
COLOR UR AUTO: YELLOW
CREAT SERPL-MCNC: 1.09 MG/DL (ref 0.73–1.18)
CREAT UR-MCNC: 194.4 MG/DL (ref 63–166)
EOSINOPHIL # BLD AUTO: 0.25 X10(3)/MCL (ref 0–0.9)
EOSINOPHIL NFR BLD AUTO: 4.4 %
ERYTHROCYTE [DISTWIDTH] IN BLOOD BY AUTOMATED COUNT: 12.9 % (ref 11.5–17)
EST. AVERAGE GLUCOSE BLD GHB EST-MCNC: 157.1 MG/DL
GFR SERPLBLD CREATININE-BSD FMLA CKD-EPI: >60 MLS/MIN/1.73/M2
GLOBULIN SER-MCNC: 3.4 GM/DL (ref 2.4–3.5)
GLUCOSE SERPL-MCNC: 162 MG/DL (ref 82–115)
GLUCOSE UR QL STRIP.AUTO: NEGATIVE
HBA1C MFR BLD: 7.1 %
HCT VFR BLD AUTO: 44.3 % (ref 42–52)
HDLC SERPL-MCNC: 44 MG/DL (ref 35–60)
HGB BLD-MCNC: 14.6 G/DL (ref 14–18)
IMM GRANULOCYTES # BLD AUTO: 0.06 X10(3)/MCL (ref 0–0.04)
IMM GRANULOCYTES NFR BLD AUTO: 1.1 %
KETONES UR QL STRIP.AUTO: NEGATIVE
LDLC SERPL CALC-MCNC: 113 MG/DL (ref 50–140)
LEFT EYE DM RETINOPATHY: NEGATIVE
LEUKOCYTE ESTERASE UR QL STRIP.AUTO: NEGATIVE
LYMPHOCYTES # BLD AUTO: 1.63 X10(3)/MCL (ref 0.6–4.6)
LYMPHOCYTES NFR BLD AUTO: 28.5 %
MCH RBC QN AUTO: 28.9 PG (ref 27–31)
MCHC RBC AUTO-ENTMCNC: 33 G/DL (ref 33–36)
MCV RBC AUTO: 87.5 FL (ref 80–94)
MICROALBUMIN UR-MCNC: 9 UG/ML
MICROALBUMIN/CREAT RATIO PNL UR: 4.6 MG/GM CR (ref 0–30)
MONOCYTES # BLD AUTO: 0.6 X10(3)/MCL (ref 0.1–1.3)
MONOCYTES NFR BLD AUTO: 10.5 %
NEUTROPHILS # BLD AUTO: 3.11 X10(3)/MCL (ref 2.1–9.2)
NEUTROPHILS NFR BLD AUTO: 54.4 %
NITRITE UR QL STRIP.AUTO: NEGATIVE
NRBC BLD AUTO-RTO: 0 %
PH UR STRIP.AUTO: 5.5 [PH]
PLATELET # BLD AUTO: 242 X10(3)/MCL (ref 130–400)
PMV BLD AUTO: 8.9 FL (ref 7.4–10.4)
POTASSIUM SERPL-SCNC: 5.2 MMOL/L (ref 3.5–5.1)
PROT SERPL-MCNC: 7.3 GM/DL (ref 5.8–7.6)
PROT UR QL STRIP.AUTO: NEGATIVE
RBC # BLD AUTO: 5.06 X10(6)/MCL (ref 4.7–6.1)
RBC UR QL AUTO: NEGATIVE
RIGHT EYE DM RETINOPATHY: NEGATIVE
SODIUM SERPL-SCNC: 144 MMOL/L (ref 136–145)
SP GR UR STRIP.AUTO: >=1.03 (ref 1–1.03)
TRIGL SERPL-MCNC: 134 MG/DL (ref 34–140)
TSH SERPL-ACNC: 0.98 UIU/ML (ref 0.35–4.94)
UROBILINOGEN UR STRIP-ACNC: 0.2
VLDLC SERPL CALC-MCNC: 27 MG/DL
WBC # SPEC AUTO: 5.71 X10(3)/MCL (ref 4.5–11.5)

## 2023-10-06 PROCEDURE — 1159F MED LIST DOCD IN RCRD: CPT | Mod: CPTII,,, | Performed by: FAMILY MEDICINE

## 2023-10-06 PROCEDURE — 3051F PR MOST RECENT HEMOGLOBIN A1C LEVEL 7.0 - < 8.0%: ICD-10-PCS | Mod: CPTII,,, | Performed by: FAMILY MEDICINE

## 2023-10-06 PROCEDURE — 3061F PR NEG MICROALBUMINURIA RESULT DOCUMENTED/REVIEW: ICD-10-PCS | Mod: CPTII,,, | Performed by: FAMILY MEDICINE

## 2023-10-06 PROCEDURE — G0439 PR MEDICARE ANNUAL WELLNESS SUBSEQUENT VISIT: ICD-10-PCS | Mod: ,,, | Performed by: FAMILY MEDICINE

## 2023-10-06 PROCEDURE — 80053 COMPREHEN METABOLIC PANEL: CPT

## 2023-10-06 PROCEDURE — 3079F PR MOST RECENT DIASTOLIC BLOOD PRESSURE 80-89 MM HG: ICD-10-PCS | Mod: CPTII,,, | Performed by: FAMILY MEDICINE

## 2023-10-06 PROCEDURE — 1101F PT FALLS ASSESS-DOCD LE1/YR: CPT | Mod: CPTII,,, | Performed by: FAMILY MEDICINE

## 2023-10-06 PROCEDURE — 1159F PR MEDICATION LIST DOCUMENTED IN MEDICAL RECORD: ICD-10-PCS | Mod: CPTII,,, | Performed by: FAMILY MEDICINE

## 2023-10-06 PROCEDURE — 36415 COLL VENOUS BLD VENIPUNCTURE: CPT

## 2023-10-06 PROCEDURE — 3066F PR DOCUMENTATION OF TREATMENT FOR NEPHROPATHY: ICD-10-PCS | Mod: CPTII,,, | Performed by: FAMILY MEDICINE

## 2023-10-06 PROCEDURE — 3051F HG A1C>EQUAL 7.0%<8.0%: CPT | Mod: CPTII,,, | Performed by: FAMILY MEDICINE

## 2023-10-06 PROCEDURE — 1101F PR PT FALLS ASSESS DOC 0-1 FALLS W/OUT INJ PAST YR: ICD-10-PCS | Mod: CPTII,,, | Performed by: FAMILY MEDICINE

## 2023-10-06 PROCEDURE — 82043 UR ALBUMIN QUANTITATIVE: CPT

## 2023-10-06 PROCEDURE — 85025 COMPLETE CBC W/AUTO DIFF WBC: CPT

## 2023-10-06 PROCEDURE — 1126F AMNT PAIN NOTED NONE PRSNT: CPT | Mod: CPTII,,, | Performed by: FAMILY MEDICINE

## 2023-10-06 PROCEDURE — 3288F PR FALLS RISK ASSESSMENT DOCUMENTED: ICD-10-PCS | Mod: CPTII,,, | Performed by: FAMILY MEDICINE

## 2023-10-06 PROCEDURE — 3288F FALL RISK ASSESSMENT DOCD: CPT | Mod: CPTII,,, | Performed by: FAMILY MEDICINE

## 2023-10-06 PROCEDURE — 1126F PR PAIN SEVERITY QUANTIFIED, NO PAIN PRESENT: ICD-10-PCS | Mod: CPTII,,, | Performed by: FAMILY MEDICINE

## 2023-10-06 PROCEDURE — 84443 ASSAY THYROID STIM HORMONE: CPT

## 2023-10-06 PROCEDURE — 3061F NEG MICROALBUMINURIA REV: CPT | Mod: CPTII,,, | Performed by: FAMILY MEDICINE

## 2023-10-06 PROCEDURE — 3066F NEPHROPATHY DOC TX: CPT | Mod: CPTII,,, | Performed by: FAMILY MEDICINE

## 2023-10-06 PROCEDURE — 3074F PR MOST RECENT SYSTOLIC BLOOD PRESSURE < 130 MM HG: ICD-10-PCS | Mod: CPTII,,, | Performed by: FAMILY MEDICINE

## 2023-10-06 PROCEDURE — 4010F PR ACE/ARB THEARPY RXD/TAKEN: ICD-10-PCS | Mod: CPTII,,, | Performed by: FAMILY MEDICINE

## 2023-10-06 PROCEDURE — 4010F ACE/ARB THERAPY RXD/TAKEN: CPT | Mod: CPTII,,, | Performed by: FAMILY MEDICINE

## 2023-10-06 PROCEDURE — G0439 PPPS, SUBSEQ VISIT: HCPCS | Mod: ,,, | Performed by: FAMILY MEDICINE

## 2023-10-06 PROCEDURE — 3074F SYST BP LT 130 MM HG: CPT | Mod: CPTII,,, | Performed by: FAMILY MEDICINE

## 2023-10-06 PROCEDURE — 80061 LIPID PANEL: CPT

## 2023-10-06 PROCEDURE — 83036 HEMOGLOBIN GLYCOSYLATED A1C: CPT

## 2023-10-06 PROCEDURE — 3079F DIAST BP 80-89 MM HG: CPT | Mod: CPTII,,, | Performed by: FAMILY MEDICINE

## 2023-10-06 RX ORDER — TADALAFIL 10 MG/1
10 TABLET ORAL DAILY PRN
COMMUNITY

## 2023-10-06 NOTE — ASSESSMENT & PLAN NOTE
Lab Results   Component Value Date    HGBA1C 7.3 (H) 10/05/2022     Urine micro 10/2022.   Foot exam today  Eye exam with dr. Mcleod today    On metformin and statin and acei

## 2023-10-06 NOTE — ASSESSMENT & PLAN NOTE
Fasting labs ordered. Will call with results when available  psa with urology  Colonoscopy with dr. alcala 11/2021 with repeat due in 5 years    Advanced care planning discussed and paperwork given.

## 2023-10-09 NOTE — PROGRESS NOTES
Please inform patient of results.    1. K is slightly elevated.  Encourage fluids. Monitor.  2. Spot glucose is 162 and a1c was 7.1- improved from previous. Continue to watch diet/exercise. Continue on metformin. monitor  3. Calcium is still elevated. Avoid calcium supplements, tums. Encourage fluids    Other labwork within acceptable ranges.

## 2023-10-10 ENCOUNTER — DOCUMENTATION ONLY (OUTPATIENT)
Dept: FAMILY MEDICINE | Facility: CLINIC | Age: 68
End: 2023-10-10
Payer: MEDICARE

## 2023-12-06 NOTE — LETTER
July 16, 2020        Amanda Barry NP  [address] [facility] [address]             [facility] [place]  [address]  [place] [address]  Phone: [phone]   Patient: Zohaib Moon   MR Number: [id number]   YOB: 1955   Date of Visit: 7/16/2020       Dear Dr. Barry:    Thank you for referring Zohaib Moon to me for evaluation. Attached you will find relevant portions of my assessment and plan of care.    If you have questions, please do not hesitate to call me. I look forward to following Zohaib Moon along with you.    Sincerely,      Lrary Gallo MD            CC  No Recipients    Enclosure          Please let them know that it was mildly off by 0. 1. we can continue to monitor with blood work but sometimes it is a normal variant and sometimes a viral infection can cause it to be off.

## 2024-01-08 PROBLEM — Z00.00 WELLNESS EXAMINATION: Status: RESOLVED | Noted: 2022-10-05 | Resolved: 2024-01-08

## 2024-01-17 ENCOUNTER — TELEPHONE (OUTPATIENT)
Dept: FAMILY MEDICINE | Facility: CLINIC | Age: 69
End: 2024-01-17
Payer: MEDICARE

## 2024-01-17 DIAGNOSIS — D22.9 MULTIPLE NEVI: Primary | ICD-10-CM

## 2024-01-17 NOTE — TELEPHONE ENCOUNTER
----- Message from Kelly Banks sent at 1/17/2024  1:51 PM CST -----  Regarding: Referral  .Type:  Needs Medical Advice    Who Called: Pt  Symptoms (please be specific): Moles   How long has patient had these symptoms:    Pharmacy name and phone #:    Would the patient rather a call back or a response via [facility]? Call back  Best Call Back Number: [phone]  Additional Information: Requesting a referral to a dermatologist, states he has a lot of moles popping out. Would like for nurse to contact him. Declined appt.

## 2024-02-22 DIAGNOSIS — M25.562 LEFT KNEE PAIN: Primary | ICD-10-CM

## 2024-03-14 ENCOUNTER — OFFICE VISIT (OUTPATIENT)
Dept: ORTHOPEDICS | Facility: CLINIC | Age: 69
End: 2024-03-14
Payer: MEDICARE

## 2024-03-14 ENCOUNTER — HOSPITAL ENCOUNTER (OUTPATIENT)
Dept: RADIOLOGY | Facility: CLINIC | Age: 69
Discharge: HOME | End: 2024-03-14
Attending: ORTHOPAEDIC SURGERY
Payer: MEDICARE

## 2024-03-14 VITALS
SYSTOLIC BLOOD PRESSURE: 138 MMHG | HEIGHT: 70 IN | DIASTOLIC BLOOD PRESSURE: 87 MMHG | HEART RATE: 84 BPM | BODY MASS INDEX: 30.78 KG/M2 | WEIGHT: 215 LBS

## 2024-03-14 DIAGNOSIS — M25.562 LEFT KNEE PAIN, UNSPECIFIED CHRONICITY: Primary | ICD-10-CM

## 2024-03-14 DIAGNOSIS — M25.562 LEFT KNEE PAIN: ICD-10-CM

## 2024-03-14 DIAGNOSIS — M25.562 LEFT KNEE PAIN, UNSPECIFIED CHRONICITY: ICD-10-CM

## 2024-03-14 DIAGNOSIS — M17.12 PRIMARY OSTEOARTHRITIS OF LEFT KNEE: ICD-10-CM

## 2024-03-14 PROCEDURE — 1159F MED LIST DOCD IN RCRD: CPT | Mod: CPTII,,, | Performed by: ORTHOPAEDIC SURGERY

## 2024-03-14 PROCEDURE — 1101F PT FALLS ASSESS-DOCD LE1/YR: CPT | Mod: CPTII,,, | Performed by: ORTHOPAEDIC SURGERY

## 2024-03-14 PROCEDURE — 3079F DIAST BP 80-89 MM HG: CPT | Mod: CPTII,,, | Performed by: ORTHOPAEDIC SURGERY

## 2024-03-14 PROCEDURE — 1160F RVW MEDS BY RX/DR IN RCRD: CPT | Mod: CPTII,,, | Performed by: ORTHOPAEDIC SURGERY

## 2024-03-14 PROCEDURE — 3008F BODY MASS INDEX DOCD: CPT | Mod: CPTII,,, | Performed by: ORTHOPAEDIC SURGERY

## 2024-03-14 PROCEDURE — 3075F SYST BP GE 130 - 139MM HG: CPT | Mod: CPTII,,, | Performed by: ORTHOPAEDIC SURGERY

## 2024-03-14 PROCEDURE — 3288F FALL RISK ASSESSMENT DOCD: CPT | Mod: CPTII,,, | Performed by: ORTHOPAEDIC SURGERY

## 2024-03-14 PROCEDURE — 4010F ACE/ARB THERAPY RXD/TAKEN: CPT | Mod: CPTII,,, | Performed by: ORTHOPAEDIC SURGERY

## 2024-03-14 PROCEDURE — 73564 X-RAY EXAM KNEE 4 OR MORE: CPT | Mod: LT,,, | Performed by: ORTHOPAEDIC SURGERY

## 2024-03-14 PROCEDURE — 99213 OFFICE O/P EST LOW 20 MIN: CPT | Mod: ,,, | Performed by: ORTHOPAEDIC SURGERY

## 2024-03-14 NOTE — PROGRESS NOTES
"Subjective:    CC: Pain of the Left Knee (Left knee pain started 6 months ago and 4 months ago it got so worse when walking and went to the walk in clinic steroid injection and drained it with relief and lasted 4 months, denies swelling, last injection was 2/14/24 with relief and drained it, denies pain today, wants to discuss a gel injection potentially)       HPI:  Patient comes in today for his 1st visit.  Patient complains of left knee pain.  Patient states more recently he has drained it, the injection twice, most recently last month.  Outside clinic.  He states it is doing better today.  He is interested in additional options going forward.    ROS: Refer to HPI for pertinent ROS. All other 12 point systems negative.    Objective:  Vitals:    03/14/24 1402   BP: 138/87   BP Location: Left arm   Patient Position: Sitting   BP Method: Medium (Automatic)   Pulse: 84   Weight: 97.5 kg (215 lb)   Height: 5' 10" (1.778 m)        Physical Exam:  The patient is well-nourished, well-developed and in no apparent distress, pleasant and cooperative. Examination of the left lower extremity compartments are soft and warm. Skin is intact. There are no signs or symptoms of DVT or infection. There is no obvious joint effusion. There is no erythema. Tender to palpation along the medial joint line and patellofemoral joint , left knee range of motion is 5-110. The knee is stable to exam with varus and valgus stressing. Negative anterior and posterior drawer. Negative Lachman´s.  Negative Nichole's test. Patella grind is positive, Negative for apprehension. Neurovascularly intact distally.  Images:  X-rays four views left knee demonstrate degenerative changes mainly in the medial and patellofemoral compartments. Images Reviewed and discussed with patient.    Assessment:  1. Left knee pain, unspecified chronicity  - X-Ray Knee Complete 4 or More Views Left; Future    2. Left knee pain  - Ambulatory referral/consult to " Orthopedics    3. Primary osteoarthritis of left knee        Plan:  At this time we discussed his physical exam and x-ray findings.  We have discussed his underlying arthritis.  We have discussed low-impact activities, knee exercises quad strengthening activities as well.  We have discussed anti-inflammatories with appropriate precautions.  We have discussed steroid versus gel injections.  Patient states he will call or return sooner if there is any new problems or difficulties.    Follow UP: No follow-ups on file.

## 2024-03-24 ENCOUNTER — OFFICE VISIT (OUTPATIENT)
Dept: URGENT CARE | Facility: CLINIC | Age: 69
End: 2024-03-24
Payer: MEDICARE

## 2024-03-24 VITALS
RESPIRATION RATE: 16 BRPM | WEIGHT: 212 LBS | HEART RATE: 68 BPM | OXYGEN SATURATION: 97 % | TEMPERATURE: 98 F | BODY MASS INDEX: 30.35 KG/M2 | SYSTOLIC BLOOD PRESSURE: 120 MMHG | HEIGHT: 70 IN | DIASTOLIC BLOOD PRESSURE: 74 MMHG

## 2024-03-24 DIAGNOSIS — H61.23 IMPACTED CERUMEN OF BOTH EARS: Primary | ICD-10-CM

## 2024-03-24 PROCEDURE — 99213 OFFICE O/P EST LOW 20 MIN: CPT | Mod: ,,, | Performed by: FAMILY MEDICINE

## 2024-03-24 NOTE — PATIENT INSTRUCTIONS
Please follow-up with your primary care physician in the next week    Please go to the emergency room if you develop any signs of speech slurring, or difficulty moving or sensing any limb, or if you have obvious balance issues    As we discussed, with a history of meningioma, and now new onset left-sided symptoms, please talk to your primary care doctor about having a CT of the head done.

## 2024-03-24 NOTE — PROGRESS NOTES
Patient ID: [id number]     Chief Complaint: upper respiratory tract infection symptoms    History of Present Illness:     Zohaib Moon is a 69 y.o. male  with a history of type 2 diabetes, bradycardia, hypertension, who presents today for symptoms of Headache (Started today, headache and blurred vision, balance issues, denies trauma )    Patient complains of left-sided headache starting today, as well as blurry vision in his left eye this morning which has since resolved.  Also complains of minor imbalance when walking sometimes, no falls or even stumbling, but he just feels like over the past couple days he had a couple of instances where his balance.  Has a history of ear wax that has to be removed.  No slurred speech, difficulty moving or sensing any part of his body.  No other eye symptoms or auras.  No nausea or vomiting.  No bowel or bladder issues.  No vertigo or room spinning.    Pt denies experiencing any fevers, chills, nausea, vomiting, difficulty breathing, dysphagia, or neck stiffness.    Past Medical History:     ----------------------------  Family history of heart disease  History of coronary angiogram  Personal history of colonic polyps     Past Surgical History:   Procedure Laterality Date    APPENDECTOMY      LIPOMA RESECTION  2018    ROBOT-ASSISTED LAPAROSCOPIC PELVIC LYMPHADENECTOMY USING DA AISHA XI  5/6/2020    Procedure: XI ROBOTIC LYMPHADENECTOMY, PELVIC;  Surgeon: Ti Maier MD;  Location: [address];  Service: Urology;;    ROBOT-ASSISTED LAPAROSCOPIC PROSTATECTOMY USING DA AISHA XI N/A 5/6/2020    Procedure: XI ROBOTIC PROSTATECTOMY;  Surgeon: Ti Maier MD;  Location: [facility] OR [address] [place] [address];  Service: Urology;  Laterality: N/A;  3hrs gen with regional      TRUS Bx  01/15/2020    VASECTOMY         Review of patient's allergies indicates:  No Known Allergies    Outpatient Medications Marked as Taking for the 3/24/24 encounter (Office Visit) with Ruslan Gomez MD    Medication Sig Dispense Refill    aspirin (ECOTRIN) 81 MG EC tablet Take 81 mg by mouth once daily.      atorvastatin (LIPITOR) 20 MG tablet Take 1 tablet (20 mg total) by mouth once daily. 90 tablet 1    carvediloL (COREG) 6.25 MG tablet Take 1 tablet (6.25 mg total) by mouth 2 (two) times daily. 180 tablet 3    lisinopriL (PRINIVIL,ZESTRIL) 20 MG tablet Take 1 tablet (20 mg total) by mouth once daily. 90 tablet 3    metFORMIN (GLUCOPHAGE) 500 MG tablet Take 1 tablet (500 mg total) by mouth 2 (two) times daily with meals. 180 tablet 3    multivitamin (THERAGRAN) per tablet Take 1 tablet by mouth every evening.      OMEGA-3-DHA-EPA-FISH OIL ORAL Take 1,000 mg by mouth.      tadalafiL (CIALIS) 10 MG tablet Take 10 mg by mouth daily as needed for Erectile Dysfunction.         Social History     Socioeconomic History    Marital status:      Spouse name: Shilpa    Number of children: 2    Years of education: 16    Highest education level: Bachelor's degree (e.g., BA, AB, BS)   Tobacco Use    Smoking status: Never    Smokeless tobacco: Never   Substance and Sexual Activity    Alcohol use: Yes     Alcohol/week: 8.0 standard drinks of alcohol     Types: 8 Shots of liquor per week     Comment: 3-4 drinks a week    Drug use: Never    Sexual activity: Yes     Partners: Female     Social Determinants of Health     Financial Resource Strain: Unknown (2/11/2020)    Overall Financial Resource Strain (CARDIA)     Difficulty of Paying Living Expenses: Patient declined   Food Insecurity: Unknown (2/11/2020)    Hunger Vital Sign     Worried About Running Out of Food in the Last Year: Patient declined     Ran Out of Food in the Last Year: Patient declined   Transportation Needs: Unknown (2/11/2020)    PRAPARE - Transportation     Lack of Transportation (Medical): Patient declined     Lack of Transportation (Non-Medical): Patient declined   Physical Activity: Insufficiently Active (2/11/2020)    Exercise Vital Sign     Days of  Exercise per Week: 2 days     Minutes of Exercise per Session: 20 min   Stress: No Stress Concern Present (2/11/2020)    [employer] [employer] - Occupational Stress Questionnaire     Feeling of Stress : Only a little   Social Connections: Unknown (2/11/2020)    Social Connection and Isolation Panel [NHANES]     Frequency of Communication with Friends and Family: Patient declined     Frequency of Social Gatherings with Friends and Family: Patient declined     Attends [employer] Services: Patient declined     Active Member of Clubs or Organizations: Patient declined     Attends Club or Organization Meetings: Patient declined     Marital Status: Patient declined        Family History   Problem Relation Age of Onset    Heart disease Father     Kidney disease Father     Diabetes Father     Heart disease Mother         Subjective:     Review of Systems   Constitutional:  Negative for chills, fever and malaise/fatigue.   HENT:  Negative for congestion, ear discharge, ear pain, sinus pain and sore throat.    Eyes:  Positive for blurred vision. Negative for double vision, photophobia, pain, discharge and redness.   Respiratory:  Negative for cough, sputum production, shortness of breath, wheezing and stridor.    Gastrointestinal:  Negative for abdominal pain, diarrhea, nausea and vomiting.   Genitourinary:  Negative for dysuria, frequency and urgency.   Musculoskeletal:  Negative for neck pain.   Skin:  Negative for rash.   Neurological:  Positive for dizziness and headaches. Negative for tingling, tremors, sensory change, speech change, focal weakness, seizures, loss of consciousness and weakness.       Objective:     Vitals:    03/24/24 1329   BP: 120/74   Pulse:    Resp:    Temp:      Body mass index is 30.42 kg/m².    Physical Exam  Constitutional:       General: He is not in acute distress.     Appearance: Normal appearance. He is normal weight. He is not ill-appearing or toxic-appearing.   HENT:       Head: Normocephalic and atraumatic.      Right Ear: Tympanic membrane and ear canal normal.      Left Ear: Tympanic membrane and ear canal normal.      Nose: No congestion or rhinorrhea.      Mouth/Throat:      Pharynx: Oropharynx is clear. No oropharyngeal exudate or posterior oropharyngeal erythema.   Eyes:      General:         Right eye: No discharge.         Left eye: No discharge.      Extraocular Movements: Extraocular movements intact.      Conjunctiva/sclera: Conjunctivae normal.   Cardiovascular:      Rate and Rhythm: Normal rate and regular rhythm.      Heart sounds: Normal heart sounds. No murmur heard.     No friction rub. No gallop.   Pulmonary:      Effort: Pulmonary effort is normal. No respiratory distress.      Breath sounds: Normal breath sounds. No stridor. No wheezing, rhonchi or rales.   Chest:      Chest wall: No tenderness.   Musculoskeletal:      Cervical back: No rigidity or tenderness.   Lymphadenopathy:      Cervical: No cervical adenopathy.   Skin:     Coloration: Skin is not pale.   Neurological:      Mental Status: He is alert and oriented to person, place, and time. Mental status is at baseline.      Cranial Nerves: No cranial nerve deficit.      Sensory: No sensory deficit.      Motor: No weakness.      Coordination: Coordination normal.      Gait: Gait normal.      Deep Tendon Reflexes: Reflexes normal.      Comments: Cerebellar tests normal   Psychiatric:         Mood and Affect: Mood normal.         Behavior: Behavior normal.         Assessment & Plan:       ICD-10-CM ICD-9-CM   1. Impacted cerumen of both ears  H61.23 380.4        1. Impacted cerumen of both ears       Physical exam is entirely normal, including complete neurological exam.  Unclear etiology, no stroke-like symptoms.  His left ear was entirely occluded with wax and we cleaned that out today.  He will see if this resolves his left-sided head symptoms and he will follow-up with his primary care physician.  He  declined emergency room referral at this point.  He did feel better after his left ear was cleaned out.  He revealed during the visit that he has a history of a meningioma perhaps somewhere in his head several years ago that was told it was benign.  Recommended follow-up with primary care to have CT done if possible and that, if he could not see primary care in the reasonable future, we may be able to accommodate him with a CT of the head order.  At the conclusion of the visit patient stated that he feels fine and physical exam did not reveal any abnormalities, but with this now known history of a meningioma plus the entirely left-sided symptoms, it was still recommended that he have CT scanning done in the near future and he voiced understanding.

## 2024-04-03 RX ORDER — ATORVASTATIN CALCIUM 20 MG/1
20 TABLET, FILM COATED ORAL DAILY
Qty: 90 TABLET | Refills: 1 | Status: SHIPPED | OUTPATIENT
Start: 2024-04-03

## 2024-04-12 RX ORDER — METFORMIN HYDROCHLORIDE 500 MG/1
500 TABLET ORAL 2 TIMES DAILY WITH MEALS
Qty: 180 TABLET | Refills: 3 | Status: SHIPPED | OUTPATIENT
Start: 2024-04-12

## 2024-04-12 RX ORDER — LISINOPRIL 20 MG/1
20 TABLET ORAL
Qty: 90 TABLET | Refills: 3 | Status: SHIPPED | OUTPATIENT
Start: 2024-04-12

## 2024-04-14 DIAGNOSIS — E11.59 HYPERTENSION ASSOCIATED WITH DIABETES: Primary | ICD-10-CM

## 2024-04-14 DIAGNOSIS — I15.2 HYPERTENSION ASSOCIATED WITH DIABETES: Primary | ICD-10-CM

## 2024-04-15 RX ORDER — CARVEDILOL 6.25 MG/1
6.25 TABLET ORAL 2 TIMES DAILY
Qty: 180 TABLET | Refills: 3 | Status: SHIPPED | OUTPATIENT
Start: 2024-04-15

## 2024-04-16 ENCOUNTER — OFFICE VISIT (OUTPATIENT)
Dept: URGENT CARE | Facility: CLINIC | Age: 69
End: 2024-04-16
Payer: MEDICARE

## 2024-04-16 VITALS
TEMPERATURE: 100 F | WEIGHT: 212 LBS | SYSTOLIC BLOOD PRESSURE: 145 MMHG | HEIGHT: 70 IN | DIASTOLIC BLOOD PRESSURE: 87 MMHG | BODY MASS INDEX: 30.35 KG/M2 | OXYGEN SATURATION: 98 % | HEART RATE: 73 BPM | RESPIRATION RATE: 16 BRPM

## 2024-04-16 DIAGNOSIS — J06.9 UPPER RESPIRATORY TRACT INFECTION, UNSPECIFIED TYPE: ICD-10-CM

## 2024-04-16 DIAGNOSIS — R05.9 COUGH, UNSPECIFIED TYPE: Primary | ICD-10-CM

## 2024-04-16 DIAGNOSIS — R52 BODY ACHES: ICD-10-CM

## 2024-04-16 LAB
CTP QC/QA: YES
POC MOLECULAR INFLUENZA A AGN: NEGATIVE
POC MOLECULAR INFLUENZA B AGN: NEGATIVE

## 2024-04-16 PROCEDURE — 96372 THER/PROPH/DIAG INJ SC/IM: CPT | Mod: ,,,

## 2024-04-16 PROCEDURE — 87502 INFLUENZA DNA AMP PROBE: CPT | Mod: QW,,,

## 2024-04-16 PROCEDURE — 99213 OFFICE O/P EST LOW 20 MIN: CPT | Mod: 25,,,

## 2024-04-16 RX ORDER — AZITHROMYCIN 250 MG/1
TABLET, FILM COATED ORAL
Qty: 6 TABLET | Refills: 0 | Status: SHIPPED | OUTPATIENT
Start: 2024-04-16 | End: 2024-04-21

## 2024-04-16 RX ORDER — BETAMETHASONE SODIUM PHOSPHATE AND BETAMETHASONE ACETATE 3; 3 MG/ML; MG/ML
6 INJECTION, SUSPENSION INTRA-ARTICULAR; INTRALESIONAL; INTRAMUSCULAR; SOFT TISSUE
Status: COMPLETED | OUTPATIENT
Start: 2024-04-16 | End: 2024-04-16

## 2024-04-16 RX ORDER — PANTOPRAZOLE SODIUM 40 MG/1
42 TABLET, DELAYED RELEASE ORAL
COMMUNITY
Start: 2024-01-08

## 2024-04-16 RX ORDER — MELOXICAM 15 MG/1
15 TABLET ORAL DAILY PRN
COMMUNITY
Start: 2024-02-21 | End: 2024-05-21 | Stop reason: SDUPTHER

## 2024-04-16 RX ORDER — PROMETHAZINE HYDROCHLORIDE AND DEXTROMETHORPHAN HYDROBROMIDE 6.25; 15 MG/5ML; MG/5ML
5 SYRUP ORAL EVERY 4 HOURS PRN
Qty: 180 ML | Refills: 0 | Status: SHIPPED | OUTPATIENT
Start: 2024-04-16 | End: 2024-04-26

## 2024-04-16 RX ADMIN — BETAMETHASONE SODIUM PHOSPHATE AND BETAMETHASONE ACETATE 6 MG: 3; 3 INJECTION, SUSPENSION INTRA-ARTICULAR; INTRALESIONAL; INTRAMUSCULAR; SOFT TISSUE at 12:04

## 2024-04-16 NOTE — PROGRESS NOTES
"Subjective:      Patient ID: Zohaib Moon is a 69 y.o. male.    Vitals:  height is 5' 10" (1.778 m) and weight is 96.2 kg (212 lb). His temperature is 99.5 °F (37.5 °C). His blood pressure is 145/87 (abnormal) and his pulse is 73. His respiration is 16 and oxygen saturation is 98%.     Chief Complaint: URI    Patient is a 69 y.o. male who presents to urgent care with complaints of productive cough and HA, mildly feverish, with some Bas and nasal congestion x 3 day onset. Negative home covid test. Alleviating factors include salt water gargle with coricidin and tylenol with mild amount of relief. Wants to test for flu.    Patient denies any SOB, CP, rash, n/v/d, or neck stiffness.          Constitution: Positive for fatigue.   Respiratory:  Positive for cough.    Neurological:  Positive for headaches.      Objective:     Physical Exam   Constitutional: He is oriented to person, place, and time. He appears well-developed. He is cooperative.  Non-toxic appearance. He does not appear ill. No distress.   HENT:   Head: Normocephalic and atraumatic.   Ears:   Right Ear: Hearing, tympanic membrane, external ear and ear canal normal.   Left Ear: Hearing, tympanic membrane, external ear and ear canal normal.   Nose: Rhinorrhea present.   Mouth/Throat: Uvula is midline and mucous membranes are normal. Mucous membranes are moist. No trismus in the jaw. Normal dentition. No uvula swelling. No oropharyngeal exudate, posterior oropharyngeal edema or posterior oropharyngeal erythema. Oropharynx is clear.      Comments: Clear postnasal drip  Eyes: Conjunctivae and lids are normal. Right conjunctiva is not injected. Left conjunctiva is not injected.   Neck: Neck supple. No edema present. No erythema present. No neck rigidity present.   Cardiovascular: Normal rate, regular rhythm, normal heart sounds and normal pulses.   Pulmonary/Chest: Effort normal and breath sounds normal. No respiratory distress. He has no decreased breath sounds. " He has no rhonchi.         Comments: Wet congested cough noted during exam    Abdominal: Normal appearance and bowel sounds are normal. There is no abdominal tenderness.   Neurological: He is alert and oriented to person, place, and time. He exhibits normal muscle tone. Coordination normal.   Skin: Skin is warm, intact and not diaphoretic.   Psychiatric: His speech is normal and behavior is normal. Mood, judgment and thought content normal.   Nursing note and vitals reviewed.      Assessment:     1. Cough, unspecified type    2. Body aches    3. Upper respiratory tract infection, unspecified type           Previous History      Review of patient's allergies indicates:  No Known Allergies    Past Medical History:   Diagnosis Date    Family history of heart disease 03/18/2020    History of coronary angiogram 03/18/2020    Personal history of colonic polyps      Current Outpatient Medications   Medication Instructions    aspirin (ECOTRIN) 81 mg, Oral, Daily    atorvastatin (LIPITOR) 20 mg, Oral, Daily    azithromycin (Z-RODOLFO) 250 MG tablet Take 2 tablets by mouth on day 1; Take 1 tablet by mouth on days 2-5<BR>    calcium carbonate/vitamin D3 (VITAMIN D-3 ORAL) 2,000 Int'l Units, Daily    carvediloL (COREG) 6.25 mg, Oral, 2 times daily    lisinopriL (PRINIVIL,ZESTRIL) 20 mg, Oral    meloxicam (MOBIC) 15 mg, Daily PRN    metFORMIN (GLUCOPHAGE) 500 mg, Oral, 2 times daily with meals    multivitamin (THERAGRAN) per tablet 1 tablet, Oral, Nightly    OMEGA-3-DHA-EPA-FISH OIL ORAL 1,000 mg, Oral    promethazine-dextromethorphan (PROMETHAZINE-DM) 6.25-15 mg/5 mL Syrp 5 mLs, Oral, Every 4 hours PRN    PROTONIX 40 mg tablet 42 tablets    tadalafiL (CIALIS) 10 mg, Daily PRN    zinc 50 mg Tab   0 Refill(s)     Past Surgical History:   Procedure Laterality Date    APPENDECTOMY      LIPOMA RESECTION  2018    ROBOT-ASSISTED LAPAROSCOPIC PELVIC LYMPHADENECTOMY USING DA AISHA XI  5/6/2020    Procedure: XI ROBOTIC LYMPHADENECTOMY,  "PELVIC;  Surgeon: Ti Maier MD;  Location: [facility] OR [address] [place] [address];  Service: Urology;;    ROBOT-ASSISTED LAPAROSCOPIC PROSTATECTOMY USING DA AISHA XI N/A 5/6/2020    Procedure: XI ROBOTIC PROSTATECTOMY;  Surgeon: Ti Maier MD;  Location: [facility] OR [address] [place] [address];  Service: Urology;  Laterality: N/A;  3hrs gen with regional      TRUS Bx  01/15/2020    VASECTOMY       Family History   Problem Relation Name Age of Onset    Heart disease Father      Kidney disease Father      Diabetes Father      Heart disease Mother         Social History     Tobacco Use    Smoking status: Never    Smokeless tobacco: Never   Substance Use Topics    Alcohol use: Yes     Alcohol/week: 8.0 standard drinks of alcohol     Types: 8 Shots of liquor per week     Comment: 3-4 drinks a week    Drug use: Never        Physical Exam      Vital Signs Reviewed   BP (!) 145/87   Pulse 73   Temp 99.5 °F (37.5 °C)   Resp 16   Ht 5' 10" (1.778 m)   Wt 96.2 kg (212 lb)   SpO2 98%   BMI 30.42 kg/m²        Procedures    Procedures     Labs     Results for orders placed or performed in visit on 10/10/23    DIABETES EYE EXAM   Result Value Ref Range    Left Eye DM Retinopathy Negative     Right Eye DM Retinopathy Negative       Plan:     COVID at home negative, flu negative today.  Patient states he feels like it was a little bit more than a cold.  We will try steroid, he states his diabetes is under control and he would like to have a shot.  He was given an antibiotic but told to hold it and only take if not getting better in the next few days or begins to get worse.     Cough, unspecified type  -     azithromycin (Z-RODOLFO) 250 MG tablet; Take 2 tablets by mouth on day 1; Take 1 tablet by mouth on days 2-5  Dispense: 6 tablet; Refill: 0  -     promethazine-dextromethorphan (PROMETHAZINE-DM) 6.25-15 mg/5 mL Syrp; Take 5 mLs by mouth every 4 (four) hours as needed (cough).  Dispense: 180 mL; Refill: 0    Body aches  -     POCT Influenza A/B " Molecular    Upper respiratory tract infection, unspecified type  -     betamethasone acetate-betamethasone sodium phosphate injection 6 mg    As discussed, you were given an antibiotic today but it is recommended you hold it and only take if not feeling better or getting worse in the next 3-4 days.  Viral illnesses are usually worse on day 3 and begin to be getting better by day 5.     Take OTC Decongestants  (Claritin D/sudafed OR Coricidin for people with HTN) to cut down on the fluid in the lining of your nose and relieve swollen nasal passages and congestion. May also use cough/cold/congestion medication such as Dayquil/Nyquil and/or antihistamine such as Claritin/Zyrtec/Allegra.  Cepacol sore throat lozenges/spray if needed.     OTC Zinc Lozenge to help decrease cold symptoms faster.    Drink plenty of fluids.  Rest.      Follow up with primary care in 5-6 days if not better.     Go directly to emergency room if you begin to have shortness of breath, chest pain, or other worrisome symptoms.

## 2024-04-16 NOTE — PATIENT INSTRUCTIONS
As discussed, you were given an antibiotic today but it is recommended you hold it and only take if not feeling better or getting worse in the next 3-4 days.  Viral illnesses are usually worse on day 3 and begin to be getting better by day 5.     Take OTC Decongestants  (Claritin D/sudafed OR Coricidin for people with HTN) to cut down on the fluid in the lining of your nose and relieve swollen nasal passages and congestion. May also use cough/cold/congestion medication such as Dayquil/Nyquil and/or antihistamine such as Claritin/Zyrtec/Allegra.  Cepacol sore throat lozenges/spray if needed.     OTC Zinc Lozenge to help decrease cold symptoms faster.    Drink plenty of fluids.  Rest.      Follow up with primary care in 5-6 days if not better.     Go directly to emergency room if you begin to have shortness of breath, chest pain, or other worrisome symptoms.

## 2024-04-16 NOTE — PROGRESS NOTES
Subjective:      Patient ID: Zohaib Moon is a 69 y.o. male.    Vitals:  vitals were not taken for this visit.     Chief Complaint: URI    HPI  ROS   Objective:     Physical Exam    Assessment:     No diagnosis found.    Plan:       There are no diagnoses linked to this encounter.

## 2024-04-22 ENCOUNTER — TELEPHONE (OUTPATIENT)
Dept: ORTHOPEDICS | Facility: CLINIC | Age: 69
End: 2024-04-22
Payer: MEDICARE

## 2024-04-22 NOTE — TELEPHONE ENCOUNTER
Patient called and left  for appt to get his knee drained and injection.     Attempted to call patient back. No answer. Left  with return number.

## 2024-04-25 ENCOUNTER — OFFICE VISIT (OUTPATIENT)
Dept: ORTHOPEDICS | Facility: CLINIC | Age: 69
End: 2024-04-25
Payer: MEDICARE

## 2024-04-25 VITALS — HEIGHT: 70 IN | WEIGHT: 212.06 LBS | BODY MASS INDEX: 30.36 KG/M2

## 2024-04-25 DIAGNOSIS — M17.12 PRIMARY OSTEOARTHRITIS OF LEFT KNEE: Primary | ICD-10-CM

## 2024-04-25 DIAGNOSIS — M25.462 EFFUSION OF LEFT KNEE: ICD-10-CM

## 2024-04-25 PROCEDURE — 1160F RVW MEDS BY RX/DR IN RCRD: CPT | Mod: CPTII,,,

## 2024-04-25 PROCEDURE — 1159F MED LIST DOCD IN RCRD: CPT | Mod: CPTII,,,

## 2024-04-25 PROCEDURE — 99214 OFFICE O/P EST MOD 30 MIN: CPT | Mod: 25,,,

## 2024-04-25 PROCEDURE — 20610 DRAIN/INJ JOINT/BURSA W/O US: CPT | Mod: LT,,,

## 2024-04-25 PROCEDURE — 3288F FALL RISK ASSESSMENT DOCD: CPT | Mod: CPTII,,,

## 2024-04-25 PROCEDURE — 1101F PT FALLS ASSESS-DOCD LE1/YR: CPT | Mod: CPTII,,,

## 2024-04-25 PROCEDURE — 4010F ACE/ARB THERAPY RXD/TAKEN: CPT | Mod: CPTII,,,

## 2024-04-25 PROCEDURE — 3008F BODY MASS INDEX DOCD: CPT | Mod: CPTII,,,

## 2024-04-25 RX ORDER — LIDOCAINE HYDROCHLORIDE 20 MG/ML
3 INJECTION, SOLUTION INFILTRATION; PERINEURAL
Status: DISCONTINUED | OUTPATIENT
Start: 2024-04-25 | End: 2024-04-25 | Stop reason: HOSPADM

## 2024-04-25 RX ADMIN — LIDOCAINE HYDROCHLORIDE 3 MG: 20 INJECTION, SOLUTION INFILTRATION; PERINEURAL at 08:04

## 2024-04-25 NOTE — PROCEDURES
Large Joint Aspiration/Injection: L knee    Date/Time: 4/25/2024 8:30 AM    Performed by: Sarah Gaytan PA-C  Authorized by: Sarah Gaytan PA-C    Consent Done?:  Yes (Verbal)  Indications:  Pain  Site marked: the procedure site was marked    Prep: patient was prepped and draped in usual sterile fashion    Approach:  Anterolateral  Location:  Knee  Site:  L knee  Medications:  3 mg LIDOcaine HCL 20 mg/ml (2%) 20 mg/mL (2 %)  Aspirate amount (mL):  25  Aspirate:  Serous and blood-tinged  Patient tolerance:  Patient tolerated the procedure well with no immediate complications

## 2024-04-25 NOTE — PROGRESS NOTES
Subjective:    CC: Pain of the Left Knee (L knee pain - pt states that he thinks that his knee needs to be drained. Would like to discuss synvisc - would like a cortisone injection today if possible. )       HPI:  Patient presents to clinic for repeat evaluation of left knee.  States that his previous aspiration and steroid injection at outside clinic on 02/14 has worn off and his swelling has returned in the knee.  Not currently taking any anti-inflammatories medication.  Patient is interested in discussing a gel injection.  He continues to have difficulty with long standing, bending and walking.  No new complaints.     ROS: Refer to HPI for pertinent ROS. All other 12 point systems negative.    Objective:    There were no vitals filed for this visit.     Physical Exam:  The patient is well-nourished, well-developed and in no apparent distress, pleasant and cooperative. Examination of the left lower extremity compartments are soft and warm. Skin is intact. There are no signs or symptoms of DVT or infection. There is moderate joint effusion. There is no erythema. Tender to palpation along the anteromedial joint line , left knee range of motion is 0-115 degrees. The knee is stable to exam with varus and valgus stressing. Negative anterior and posterior drawer. Negative Lachman´s.  Negative Nichole's test. Patella grind is positive, Negative for apprehension. Neurovascularly intact distally.    Images:  Previous Images Reviewed and discussed with patient.    Assessment:  1. Primary osteoarthritis of left knee  - Prior authorization Order    2. Effusion of left knee  - Large Joint Aspiration/Injection: L knee  - LIDOcaine HCL 20 mg/ml (2%) injection 3 mg       Plan:  Physical exam and previous imaging findings discussed with patient.  He tolerated aspiration of his left knee with a proximally 25 cc serosanguineous fluid with slight blood tinge.  We have again discussed his underlying arthritis and recurrent swelling.   I believe he is a good candidate for gel injections.  We will set this up.  In the meantime we have discussed utilizing Mobic as needed with appropriate precautions.  Low-impact activities.  I would like to see the patient back in 2 weeks to assess his progress with possible gel injection at this time.    Follow up: Follow up in about 2 weeks (around 5/9/2024).    Large Joint Aspiration/Injection: L knee    Date/Time: 4/25/2024 8:30 AM    Performed by: Sarah Gaytan PA-C  Authorized by: Sarah Gaytan PA-C    Consent Done?:  Yes (Verbal)  Indications:  Pain  Site marked: the procedure site was marked    Prep: patient was prepped and draped in usual sterile fashion    Approach:  Anterolateral  Location:  Knee  Site:  L knee  Medications:  3 mg LIDOcaine HCL 20 mg/ml (2%) 20 mg/mL (2 %)  Aspirate amount (mL):  25  Aspirate:  Serous and blood-tinged  Patient tolerance:  Patient tolerated the procedure well with no immediate complications

## 2024-05-09 ENCOUNTER — OFFICE VISIT (OUTPATIENT)
Dept: ORTHOPEDICS | Facility: CLINIC | Age: 69
End: 2024-05-09
Payer: MEDICARE

## 2024-05-09 DIAGNOSIS — M17.12 OSTEOARTHRITIS OF LEFT KNEE, UNSPECIFIED OSTEOARTHRITIS TYPE: Primary | ICD-10-CM

## 2024-05-09 PROCEDURE — 20610 DRAIN/INJ JOINT/BURSA W/O US: CPT | Mod: LT,,,

## 2024-05-09 PROCEDURE — 1159F MED LIST DOCD IN RCRD: CPT | Mod: CPTII,,,

## 2024-05-09 PROCEDURE — 1160F RVW MEDS BY RX/DR IN RCRD: CPT | Mod: CPTII,,,

## 2024-05-09 PROCEDURE — 4010F ACE/ARB THERAPY RXD/TAKEN: CPT | Mod: CPTII,,,

## 2024-05-09 PROCEDURE — 99214 OFFICE O/P EST MOD 30 MIN: CPT | Mod: 25,,,

## 2024-05-09 RX ORDER — LIDOCAINE HYDROCHLORIDE 20 MG/ML
2 INJECTION, SOLUTION INFILTRATION; PERINEURAL
Status: DISCONTINUED | OUTPATIENT
Start: 2024-05-09 | End: 2024-05-09 | Stop reason: HOSPADM

## 2024-05-09 RX ADMIN — LIDOCAINE HYDROCHLORIDE 2 ML: 20 INJECTION, SOLUTION INFILTRATION; PERINEURAL at 09:05

## 2024-05-09 NOTE — PROGRESS NOTES
Subjective:    CC: Injections of the Left Knee (Left knee syncisc injection)       HPI:  Patient presents to clinic for repeat evaluation of left knee with gel injection.  He recently he tolerated a aspiration 2 weeks ago.  He states this did give him some relief but continues to have pain with long standing, walking and getting up from a seated position.  He states he has no longer taking the Mobic as it caused him to be dizzy.  He is ready to proceed with a gel injection today.  No new complaints.  ROS: Refer to HPI for pertinent ROS. All other 12 point systems negative.    Objective:    There were no vitals filed for this visit.     Physical Exam: Examination of the left lower extremity compartments are soft and warm. Skin is intact. There are no signs or symptoms of DVT or infection. There is moderate joint effusion. There is no erythema. Tender to palpation along the anteromedial joint line , left knee range of motion is 0-115 degrees. The knee is stable to exam with varus and valgus stressing. Negative anterior and posterior drawer. Negative Lachman´s. Negative Nichole's test. Patella grind is positive, Negative for apprehension. Neurovascularly intact distally.     Images:  previous Images Reviewed and discussed with patient.    Assessment:  1. Osteoarthritis of left knee, unspecified osteoarthritis type  - Large Joint Aspiration/Injection: L knee  - hylan g-f 20 (SYNVISC ONE) 48 mg/6 mL injection 48 mg  - LIDOcaine HCL 20 mg/ml (2%) injection 2 mL       Plan:  Physical exam and previous imaging findings discussed with patient.  He tolerated a left knee aspiration of approximately 20 cc serous fluid.  Patient then tolerated a left knee Synvisc injection to the same portal.  Instructed to continue low-impact activities and OTC anti-inflammatories as needed with appropriate precautions.  We have discussed prescription anti-inflammatories however given his reaction to Mobic we will hold off.  I would like to  see the patient back in 3 months to assess his progress.  Patient understands to call clinic with any problems or difficulties.    Follow up: Follow up in about 3 months (around 8/9/2024).    Large Joint Aspiration/Injection: L knee    Date/Time: 5/9/2024 9:00 AM    Performed by: Sarah Gaytan PA-C  Authorized by: Sraah Gaytan PA-C    Consent Done?:  Yes (Verbal)  Indications:  Pain and arthritis  Site marked: the procedure site was marked    Timeout: prior to procedure the correct patient, procedure, and site was verified    Prep: patient was prepped and draped in usual sterile fashion      Local anesthesia used?: Yes    Local anesthetic:  Topical anesthetic and lidocaine 2% without epinephrine  Ultrasonic Guidance for needle placement?: No    Approach:  Anterolateral  Location:  Knee  Site:  L knee  Medications:  48 mg hylan g-f 20 48 mg/6 mL; 2 mL LIDOcaine HCL 20 mg/ml (2%) 20 mg/mL (2 %)  Patient tolerance:  Patient tolerated the procedure well with no immediate complications

## 2024-05-09 NOTE — PROCEDURES
Large Joint Aspiration/Injection: L knee    Date/Time: 5/9/2024 9:00 AM    Performed by: Sarah Gaytan PA-C  Authorized by: Sarah Gaytan PA-C    Consent Done?:  Yes (Verbal)  Indications:  Pain and arthritis  Site marked: the procedure site was marked    Timeout: prior to procedure the correct patient, procedure, and site was verified    Prep: patient was prepped and draped in usual sterile fashion      Local anesthesia used?: Yes    Local anesthetic:  Topical anesthetic and lidocaine 2% without epinephrine  Ultrasonic Guidance for needle placement?: No    Approach:  Anterolateral  Location:  Knee  Site:  L knee  Medications:  48 mg hylan g-f 20 48 mg/6 mL; 2 mL LIDOcaine HCL 20 mg/ml (2%) 20 mg/mL (2 %)  Patient tolerance:  Patient tolerated the procedure well with no immediate complications

## 2024-05-21 DIAGNOSIS — M17.12 OSTEOARTHRITIS OF LEFT KNEE, UNSPECIFIED OSTEOARTHRITIS TYPE: Primary | ICD-10-CM

## 2024-05-21 RX ORDER — MELOXICAM 15 MG/1
15 TABLET ORAL DAILY PRN
Qty: 30 TABLET | Refills: 0 | Status: SHIPPED | OUTPATIENT
Start: 2024-05-21

## 2024-07-16 DIAGNOSIS — M17.12 OSTEOARTHRITIS OF LEFT KNEE, UNSPECIFIED OSTEOARTHRITIS TYPE: ICD-10-CM

## 2024-07-16 RX ORDER — MELOXICAM 15 MG/1
15 TABLET ORAL DAILY PRN
Qty: 30 TABLET | Refills: 0 | Status: SHIPPED | OUTPATIENT
Start: 2024-07-16

## 2024-08-12 ENCOUNTER — LAB VISIT (OUTPATIENT)
Dept: LAB | Facility: HOSPITAL | Age: 69
End: 2024-08-12
Attending: FAMILY MEDICINE
Payer: MEDICARE

## 2024-08-12 ENCOUNTER — OFFICE VISIT (OUTPATIENT)
Dept: FAMILY MEDICINE | Facility: CLINIC | Age: 69
End: 2024-08-12
Payer: MEDICARE

## 2024-08-12 ENCOUNTER — PATIENT MESSAGE (OUTPATIENT)
Dept: FAMILY MEDICINE | Facility: CLINIC | Age: 69
End: 2024-08-12

## 2024-08-12 ENCOUNTER — TELEPHONE (OUTPATIENT)
Dept: FAMILY MEDICINE | Facility: CLINIC | Age: 69
End: 2024-08-12

## 2024-08-12 VITALS
SYSTOLIC BLOOD PRESSURE: 128 MMHG | OXYGEN SATURATION: 98 % | BODY MASS INDEX: 31.09 KG/M2 | WEIGHT: 217.13 LBS | TEMPERATURE: 98 F | DIASTOLIC BLOOD PRESSURE: 84 MMHG | RESPIRATION RATE: 16 BRPM | HEART RATE: 62 BPM | HEIGHT: 70 IN

## 2024-08-12 DIAGNOSIS — Z00.00 WELLNESS EXAMINATION: ICD-10-CM

## 2024-08-12 DIAGNOSIS — I15.2 HYPERTENSION ASSOCIATED WITH DIABETES: ICD-10-CM

## 2024-08-12 DIAGNOSIS — E11.9 TYPE 2 DIABETES MELLITUS WITHOUT COMPLICATION, WITHOUT LONG-TERM CURRENT USE OF INSULIN: Primary | ICD-10-CM

## 2024-08-12 DIAGNOSIS — E11.59 HYPERTENSION ASSOCIATED WITH DIABETES: ICD-10-CM

## 2024-08-12 DIAGNOSIS — E66.09 CLASS 1 OBESITY DUE TO EXCESS CALORIES WITH SERIOUS COMORBIDITY AND BODY MASS INDEX (BMI) OF 31.0 TO 31.9 IN ADULT: ICD-10-CM

## 2024-08-12 DIAGNOSIS — E11.9 TYPE 2 DIABETES MELLITUS WITHOUT COMPLICATION, WITHOUT LONG-TERM CURRENT USE OF INSULIN: ICD-10-CM

## 2024-08-12 DIAGNOSIS — E78.5 HYPERLIPIDEMIA ASSOCIATED WITH TYPE 2 DIABETES MELLITUS: ICD-10-CM

## 2024-08-12 DIAGNOSIS — E11.69 HYPERLIPIDEMIA ASSOCIATED WITH TYPE 2 DIABETES MELLITUS: ICD-10-CM

## 2024-08-12 PROBLEM — Z23 NEEDS FLU SHOT: Status: RESOLVED | Noted: 2022-10-05 | Resolved: 2024-08-12

## 2024-08-12 LAB
ALBUMIN SERPL-MCNC: 3.8 G/DL (ref 3.4–4.8)
ALBUMIN/GLOB SERPL: 1.2 RATIO (ref 1.1–2)
ALP SERPL-CCNC: 57 UNIT/L (ref 40–150)
ALT SERPL-CCNC: 19 UNIT/L (ref 0–55)
ANION GAP SERPL CALC-SCNC: 7 MEQ/L
AST SERPL-CCNC: 16 UNIT/L (ref 5–34)
BILIRUB SERPL-MCNC: 0.8 MG/DL
BILIRUB UR QL STRIP.AUTO: NEGATIVE
BUN SERPL-MCNC: 29.3 MG/DL (ref 8.4–25.7)
CALCIUM SERPL-MCNC: 10 MG/DL (ref 8.8–10)
CHLORIDE SERPL-SCNC: 107 MMOL/L (ref 98–107)
CLARITY UR: CLEAR
CO2 SERPL-SCNC: 27 MMOL/L (ref 23–31)
COLOR UR AUTO: YELLOW
CREAT SERPL-MCNC: 1.04 MG/DL (ref 0.73–1.18)
CREAT/UREA NIT SERPL: 28
EST. AVERAGE GLUCOSE BLD GHB EST-MCNC: 157.1 MG/DL
GFR SERPLBLD CREATININE-BSD FMLA CKD-EPI: >60 ML/MIN/1.73/M2
GLOBULIN SER-MCNC: 3.2 GM/DL (ref 2.4–3.5)
GLUCOSE SERPL-MCNC: 156 MG/DL (ref 82–115)
GLUCOSE UR QL STRIP: NEGATIVE
HBA1C MFR BLD: 7.1 %
HGB UR QL STRIP: NEGATIVE
KETONES UR QL STRIP: NEGATIVE
LEUKOCYTE ESTERASE UR QL STRIP: NEGATIVE
NITRITE UR QL STRIP: NEGATIVE
PH UR STRIP: 6.5 [PH]
POTASSIUM SERPL-SCNC: 4.3 MMOL/L (ref 3.5–5.1)
PROT SERPL-MCNC: 7 GM/DL (ref 5.8–7.6)
PROT UR QL STRIP: NEGATIVE
SODIUM SERPL-SCNC: 141 MMOL/L (ref 136–145)
SP GR UR STRIP.AUTO: 1.02 (ref 1–1.03)
UROBILINOGEN UR STRIP-ACNC: 0.2

## 2024-08-12 PROCEDURE — 3008F BODY MASS INDEX DOCD: CPT | Mod: CPTII,,, | Performed by: FAMILY MEDICINE

## 2024-08-12 PROCEDURE — 1101F PT FALLS ASSESS-DOCD LE1/YR: CPT | Mod: CPTII,,, | Performed by: FAMILY MEDICINE

## 2024-08-12 PROCEDURE — 3051F HG A1C>EQUAL 7.0%<8.0%: CPT | Mod: CPTII,,, | Performed by: FAMILY MEDICINE

## 2024-08-12 PROCEDURE — 3079F DIAST BP 80-89 MM HG: CPT | Mod: CPTII,,, | Performed by: FAMILY MEDICINE

## 2024-08-12 PROCEDURE — 4010F ACE/ARB THERAPY RXD/TAKEN: CPT | Mod: CPTII,,, | Performed by: FAMILY MEDICINE

## 2024-08-12 PROCEDURE — 3288F FALL RISK ASSESSMENT DOCD: CPT | Mod: CPTII,,, | Performed by: FAMILY MEDICINE

## 2024-08-12 PROCEDURE — 36415 COLL VENOUS BLD VENIPUNCTURE: CPT

## 2024-08-12 PROCEDURE — 1159F MED LIST DOCD IN RCRD: CPT | Mod: CPTII,,, | Performed by: FAMILY MEDICINE

## 2024-08-12 PROCEDURE — 83036 HEMOGLOBIN GLYCOSYLATED A1C: CPT

## 2024-08-12 PROCEDURE — 80053 COMPREHEN METABOLIC PANEL: CPT

## 2024-08-12 PROCEDURE — 99213 OFFICE O/P EST LOW 20 MIN: CPT | Mod: ,,, | Performed by: FAMILY MEDICINE

## 2024-08-12 PROCEDURE — 1126F AMNT PAIN NOTED NONE PRSNT: CPT | Mod: CPTII,,, | Performed by: FAMILY MEDICINE

## 2024-08-12 PROCEDURE — 3074F SYST BP LT 130 MM HG: CPT | Mod: CPTII,,, | Performed by: FAMILY MEDICINE

## 2024-08-12 RX ORDER — TADALAFIL 20 MG/1
20 TABLET ORAL DAILY
COMMUNITY

## 2024-08-12 RX ORDER — TIRZEPATIDE 2.5 MG/.5ML
2.5 INJECTION, SOLUTION SUBCUTANEOUS
Qty: 2 ML | Refills: 2 | Status: SHIPPED | OUTPATIENT
Start: 2024-08-12 | End: 2024-08-15

## 2024-08-12 NOTE — PROGRESS NOTES
Please inform patient of results.    1. Ua is wnl  2. Spot glucose is 156 and A1c is 7.1. ok to start mounjaro. Let us know if issues with pharmacy  3. Bun is elevated. But creatinine and gfr are wnl. Encourage fluids. monitor  4. Calcium and K are now wnl.     Other labwork within acceptable ranges.

## 2024-08-12 NOTE — PROGRESS NOTES
Subjective:        Patient ID: Zohaib Moon is a 69 y.o. male.    Chief Complaint: Follow-up (6 month follow up )      presents to the clinic unaccompanied for chronic condition follow up. He is due for his wellness visit in october.       The patient has elevated PSA. He was seeing Dr. Hernandez in [place]. he was diagnosed with prostate cancer 2019. Had prostate removal with dr. rambo adair at [facility] - robotic surgery. Does not see him anymore. had radiation. He sees dr. Cannon locally.  He checks his psa.  Not sure when next appt is. Sees him q3-6 months.      Patient has diabetes. He has not been checking his blood sugars however he reports compliance with his medication (metformin 500mg bid). His last foot exam was done 10/2023. urine micro 10/2023. He had his eye exam done at Dr. [facility]. A1c 7.1 10/2023.  Would like to try injectable to help with weight. Denies history of thyroid cancer.      He has hypertension and hyperlipidemia. He is compliant with his medications. He is on asa.  Not seeing cards locally.      He is obese. has been riding bike. doing cardio and weights.     He is not ALLERGIC to any medications. He drinks alcohol rarely. He does not smoke. He had a colonoscopy done with Dr. Ivory 3/2017 and 11/2021. Repeat due in 5 years.       Declines immunizations today.        Review of Systems   Constitutional: Negative.    HENT: Negative.     Eyes: Negative.    Respiratory: Negative.     Cardiovascular: Negative.    Gastrointestinal: Negative.    Endocrine: Negative.    Genitourinary: Negative.    Musculoskeletal: Negative.    Skin: Negative.    Allergic/Immunologic: Negative.    Neurological: Negative.    Hematological: Negative.    Psychiatric/Behavioral: Negative.     All other systems reviewed and are negative.        Review of patient's allergies indicates:  No Known Allergies   Vitals:    08/12/24 0924   BP: 128/84   BP Location: Left arm   Pulse: 62   Resp: 16   Temp: 98.3 °F (36.8 °C)  "  TempSrc: Temporal   SpO2: 98%   Weight: 98.5 kg (217 lb 1.6 oz)   Height: 5' 10" (1.778 m)      Social History     Socioeconomic History    Marital status:      Spouse name: Shilpa    Number of children: 2    Years of education: 16    Highest education level: Bachelor's degree (e.g., BA, AB, BS)   Tobacco Use    Smoking status: Never    Smokeless tobacco: Never   Substance and Sexual Activity    Alcohol use: Yes     Alcohol/week: 8.0 standard drinks of alcohol     Types: 8 Shots of liquor per week     Comment: 3-4 drinks a week    Drug use: Never    Sexual activity: Yes     Partners: Female     Social Determinants of Health     Financial Resource Strain: Unknown (2/11/2020)    Overall Financial Resource Strain (CARDIA)     Difficulty of Paying Living Expenses: Patient declined   Food Insecurity: Unknown (2/11/2020)    Hunger Vital Sign     Worried About Running Out of Food in the Last Year: Patient declined     Ran Out of Food in the Last Year: Patient declined   Transportation Needs: Unknown (2/11/2020)    PRAPARE - Transportation     Lack of Transportation (Medical): Patient declined     Lack of Transportation (Non-Medical): Patient declined   Physical Activity: Insufficiently Active (2/11/2020)    Exercise Vital Sign     Days of Exercise per Week: 2 days     Minutes of Exercise per Session: 20 min   Stress: No Stress Concern Present (2/11/2020)    [employer] [place] [employer] - Occupational Stress Questionnaire     Feeling of Stress : Only a little      Family History   Problem Relation Name Age of Onset    Heart disease Father      Kidney disease Father      Diabetes Father      Heart disease Mother            Objective:     Physical Exam  Vitals and nursing note reviewed.   Constitutional:       Appearance: Normal appearance. He is obese.   HENT:      Head: Normocephalic.      Nose: Nose normal.      Mouth/Throat:      Mouth: Mucous membranes are moist.      Pharynx: Oropharynx is clear. "   Eyes:      Extraocular Movements: Extraocular movements intact.   Cardiovascular:      Rate and Rhythm: Normal rate and regular rhythm.   Pulmonary:      Effort: Pulmonary effort is normal.      Breath sounds: Normal breath sounds.   Musculoskeletal:         General: Normal range of motion.   Skin:     General: Skin is warm and dry.   Neurological:      General: No focal deficit present.      Mental Status: He is alert and oriented to person, place, and time. Mental status is at baseline.   Psychiatric:         Mood and Affect: Mood normal.       Current Outpatient Medications on File Prior to Visit   Medication Sig Dispense Refill    aspirin (ECOTRIN) 81 MG EC tablet Take 81 mg by mouth once daily.      carvediloL (COREG) 6.25 MG tablet TAKE 1 TABLET BY MOUTH TWICE A  tablet 3    lisinopriL (PRINIVIL,ZESTRIL) 20 MG tablet TAKE 1 TABLET BY MOUTH EVERY DAY 90 tablet 3    meloxicam (MOBIC) 15 MG tablet Take 1 tablet (15 mg total) by mouth daily as needed for Pain. 30 tablet 0    metFORMIN (GLUCOPHAGE) 500 MG tablet TAKE 1 TABLET BY MOUTH TWICE A DAY WITH MEALS 180 tablet 3    multivitamin (THERAGRAN) per tablet Take 1 tablet by mouth every evening.      mv-min/folic/K1/lycopen/lutein (CENTRUM SILVER MEN ORAL) tablet      OMEGA-3-DHA-EPA-FISH OIL ORAL Take 1,000 mg by mouth.      ubidecarenone (COQ-10 ORAL) capsule      zinc 50 mg Tab   0 Refill(s)       No current facility-administered medications on file prior to visit.     Health Maintenance   Topic Date Due    Foot Exam  10/06/2024    Eye Exam  10/06/2024    Lipid Panel  10/06/2024    Hemoglobin A1c  02/12/2025    Low Dose Statin  08/26/2025    Colorectal Cancer Screening  11/29/2026    TETANUS VACCINE  12/03/2029    Hepatitis C Screening  Completed    Shingles Vaccine  Completed      Results for orders placed or performed in visit on 08/12/24   Urinalysis   Result Value Ref Range    Color, UA Yellow Yellow, Light-Yellow, Dark Yellow, Yasmeen, Straw     Appearance, UA Clear Clear    Specific Gravity, UA 1.025 1.005 - 1.030    pH, UA 6.5 5.0 - 8.5    Protein, UA Negative Negative    Glucose, UA Negative Negative, Normal    Ketones, UA Negative Negative    Blood, UA Negative Negative    Bilirubin, UA Negative Negative    Urobilinogen, UA 0.2 0.2, 1.0, Normal    Nitrites, UA Negative Negative    Leukocyte Esterase, UA Negative Negative          Assessment & Plan:     Active Problem List with Overview Notes    Diagnosis Date Noted    Advanced care planning/counseling discussion 10/05/2023    Colon cancer screening 10/05/2023    Erectile dysfunction following radical prostatectomy 10/19/2020    Hypertension associated with diabetes 03/18/2020    Type 2 diabetes mellitus 03/18/2020    Hyperlipidemia associated with type 2 diabetes mellitus 03/18/2020    Hearing loss 03/18/2020    Class 1 obesity due to excess calories with serious comorbidity and body mass index (BMI) of 31.0 to 31.9 in adult 03/18/2020    Snoring 03/18/2020    Sinus bradycardia 03/18/2020    Total bilirubin, elevated 03/18/2020    Prostate cancer 02/11/2020       1. Type 2 diabetes mellitus without complication, without long-term current use of insulin  Assessment & Plan:  Lab Results   Component Value Date    HGBA1C 7.1 (H) 10/06/2023     Urine micro 10/2023.   Foot exam 10/2023  Eye exam with dr. [facility]. Will request    On metformin and statin and acei.  Will try mounjaro. Rx sent in. Demonstrator pen used.  Take as directed. Contact clinic for concerns. Patient is agreeable to plan and verbalized understanding    Orders:  -     CBC Auto Differential; Future; Expected date: 11/12/2024  -     Comprehensive Metabolic Panel; Future; Expected date: 11/12/2024  -     Lipid Panel; Future; Expected date: 11/12/2024  -     TSH; Future; Expected date: 11/12/2024  -     Hemoglobin A1C; Future; Expected date: 11/12/2024  -     Urinalysis; Future; Expected date: 11/12/2024  -     Microalbumin/Creatinine Ratio,  "Urine; Future; Expected date: 11/12/2024  -     Comprehensive Metabolic Panel; Future; Expected date: 08/12/2024  -     Hemoglobin A1C; Future; Expected date: 08/12/2024  -     Discontinue: tirzepatide (MOUNJARO) 2.5 mg/0.5 mL PnIj; Inject 2.5 mg into the skin every 7 days.  Dispense: 2 mL; Refill: 2    2. Hypertension associated with diabetes  Assessment & Plan:  Well controlled on current meds. On asa    Orders:  -     CBC Auto Differential; Future; Expected date: 11/12/2024  -     Comprehensive Metabolic Panel; Future; Expected date: 11/12/2024  -     Lipid Panel; Future; Expected date: 11/12/2024  -     TSH; Future; Expected date: 11/12/2024  -     Hemoglobin A1C; Future; Expected date: 11/12/2024  -     Urinalysis; Future; Expected date: 11/12/2024  -     Microalbumin/Creatinine Ratio, Urine; Future; Expected date: 11/12/2024    3. Hyperlipidemia associated with type 2 diabetes mellitus  Assessment & Plan:  On statin and fish oil    Lab Results   Component Value Date    CHOL 184 10/06/2023    CHOL 154 10/05/2022    CHOL 172 09/28/2021     Lab Results   Component Value Date    HDL 44 10/06/2023    HDL 37 10/05/2022    HDL 45 09/28/2021     No results found for: "LDLCALC"  Lab Results   Component Value Date    TRIG 134 10/06/2023    TRIG 95 10/05/2022    TRIG 108 09/28/2021       No results found for: "CHOLHDL"      Orders:  -     CBC Auto Differential; Future; Expected date: 11/12/2024  -     Comprehensive Metabolic Panel; Future; Expected date: 11/12/2024  -     Lipid Panel; Future; Expected date: 11/12/2024  -     TSH; Future; Expected date: 11/12/2024  -     Hemoglobin A1C; Future; Expected date: 11/12/2024  -     Urinalysis; Future; Expected date: 11/12/2024  -     Microalbumin/Creatinine Ratio, Urine; Future; Expected date: 11/12/2024    4. Class 1 obesity due to excess calories with serious comorbidity and body mass index (BMI) of 31.0 to 31.9 in adult  Assessment & Plan:  Encourage lifestyle " change    Orders:  -     CBC Auto Differential; Future; Expected date: 11/12/2024  -     Comprehensive Metabolic Panel; Future; Expected date: 11/12/2024  -     Lipid Panel; Future; Expected date: 11/12/2024  -     TSH; Future; Expected date: 11/12/2024  -     Hemoglobin A1C; Future; Expected date: 11/12/2024  -     Urinalysis; Future; Expected date: 11/12/2024  -     Microalbumin/Creatinine Ratio, Urine; Future; Expected date: 11/12/2024    5. Wellness examination  -     CBC Auto Differential; Future; Expected date: 11/12/2024  -     Comprehensive Metabolic Panel; Future; Expected date: 11/12/2024  -     Lipid Panel; Future; Expected date: 11/12/2024  -     TSH; Future; Expected date: 11/12/2024  -     Hemoglobin A1C; Future; Expected date: 11/12/2024  -     Urinalysis; Future; Expected date: 11/12/2024  -     Microalbumin/Creatinine Ratio, Urine; Future; Expected date: 11/12/2024         Follow up for as scheduled or sooner prn.

## 2024-08-12 NOTE — TELEPHONE ENCOUNTER
----- Message from Yaritza Levy MD sent at 8/12/2024 12:27 PM CDT -----  Please inform patient of results.    1. Ua is wnl  2. Spot glucose is 156 and A1c is 7.1. ok to start mounjaro. Let us know if issues with pharmacy  3. Bun is elevated. But creatinine and gfr are wnl. Encourage fluids. monitor  4. Calcium and K are now wnl.     Other labwork within acceptable ranges.

## 2024-08-12 NOTE — ASSESSMENT & PLAN NOTE
Lab Results   Component Value Date    HGBA1C 7.1 (H) 10/06/2023     Urine micro 10/2023.   Foot exam 10/2023  Eye exam with dr. [facility]. Will request    On metformin and statin and acei.  Will try mounjaro. Rx sent in. Demonstrator pen used.  Take as directed. Contact clinic for concerns. Patient is agreeable to plan and verbalized understanding

## 2024-08-12 NOTE — ASSESSMENT & PLAN NOTE
"On statin and fish oil    Lab Results   Component Value Date    CHOL 184 10/06/2023    CHOL 154 10/05/2022    CHOL 172 09/28/2021     Lab Results   Component Value Date    HDL 44 10/06/2023    HDL 37 10/05/2022    HDL 45 09/28/2021     No results found for: "LDLCALC"  Lab Results   Component Value Date    TRIG 134 10/06/2023    TRIG 95 10/05/2022    TRIG 108 09/28/2021       No results found for: "CHOLHDL"    "

## 2024-08-14 ENCOUNTER — TELEPHONE (OUTPATIENT)
Dept: FAMILY MEDICINE | Facility: CLINIC | Age: 69
End: 2024-08-14
Payer: MEDICARE

## 2024-08-15 RX ORDER — SEMAGLUTIDE 0.68 MG/ML
INJECTION, SOLUTION SUBCUTANEOUS
Qty: 3 EACH | Refills: 2 | Status: SHIPPED | OUTPATIENT
Start: 2024-08-15

## 2024-08-15 NOTE — TELEPHONE ENCOUNTER
I will approve ozempic since pharmacy is out of [place]. He can call with questions or possibly wait to see if mounjaro will come in soon    I have signed for the following orders AND/OR meds.  Please call the patient and ask the patient to schedule the testing AND/OR inform about any medications that were sent.        Medications Ordered This Encounter   Medications    OZEMPIC 0.25 mg or 0.5 mg (2 mg/3 mL) pen injector     Sig: INJECT 0.25MG ONCE WEEKLY BY SUBCUTANEOUS ROUTE FOR 4 WKS, THEN INCREASE TO 0.5MG THEREAFTER     Dispense:  3 each     Refill:  2

## 2024-08-15 NOTE — TELEPHONE ENCOUNTER
Please see the attached refill request. Med out of stock. They are requesting rx ozempic instead.    I did notify patient of this. He has not replied to me in the portal. He did read my message and is aware

## 2024-08-20 DIAGNOSIS — E11.9 TYPE 2 DIABETES MELLITUS WITHOUT COMPLICATION, WITHOUT LONG-TERM CURRENT USE OF INSULIN: ICD-10-CM

## 2024-08-20 RX ORDER — SEMAGLUTIDE 0.68 MG/ML
0.25 INJECTION, SOLUTION SUBCUTANEOUS
Qty: 3 EACH | Refills: 2 | Status: SHIPPED | OUTPATIENT
Start: 2024-08-20

## 2024-08-26 RX ORDER — ATORVASTATIN CALCIUM 20 MG/1
20 TABLET, FILM COATED ORAL
Qty: 90 TABLET | Refills: 0 | Status: SHIPPED | OUTPATIENT
Start: 2024-08-26

## 2024-09-16 ENCOUNTER — TELEPHONE (OUTPATIENT)
Dept: ORTHOPEDICS | Facility: CLINIC | Age: 69
End: 2024-09-16
Payer: MEDICARE

## 2024-09-16 NOTE — TELEPHONE ENCOUNTER
PATIENT CALLED STATING THAT HE IS HAVING INCREASED PAIN AFTER INJECTION.     ATTEMPTED TO CALL PATIENT. NO ANSWER. UNABLE TO LEAVE VM.

## 2024-10-07 ENCOUNTER — OFFICE VISIT (OUTPATIENT)
Dept: FAMILY MEDICINE | Facility: CLINIC | Age: 69
End: 2024-10-07
Payer: MEDICARE

## 2024-10-07 VITALS
DIASTOLIC BLOOD PRESSURE: 80 MMHG | TEMPERATURE: 98 F | RESPIRATION RATE: 16 BRPM | HEART RATE: 77 BPM | BODY MASS INDEX: 29.37 KG/M2 | SYSTOLIC BLOOD PRESSURE: 118 MMHG | WEIGHT: 205.19 LBS | HEIGHT: 70 IN | OXYGEN SATURATION: 98 %

## 2024-10-07 DIAGNOSIS — E11.69 HYPERLIPIDEMIA ASSOCIATED WITH TYPE 2 DIABETES MELLITUS: ICD-10-CM

## 2024-10-07 DIAGNOSIS — E11.59 HYPERTENSION ASSOCIATED WITH DIABETES: ICD-10-CM

## 2024-10-07 DIAGNOSIS — E78.5 HYPERLIPIDEMIA ASSOCIATED WITH TYPE 2 DIABETES MELLITUS: ICD-10-CM

## 2024-10-07 DIAGNOSIS — Z00.00 WELLNESS EXAMINATION: Primary | ICD-10-CM

## 2024-10-07 DIAGNOSIS — I15.2 HYPERTENSION ASSOCIATED WITH DIABETES: ICD-10-CM

## 2024-10-07 DIAGNOSIS — E11.9 TYPE 2 DIABETES MELLITUS WITHOUT COMPLICATION, WITHOUT LONG-TERM CURRENT USE OF INSULIN: ICD-10-CM

## 2024-10-07 DIAGNOSIS — C61 PROSTATE CANCER: ICD-10-CM

## 2024-10-07 DIAGNOSIS — Z71.89 ADVANCED CARE PLANNING/COUNSELING DISCUSSION: ICD-10-CM

## 2024-10-07 DIAGNOSIS — N52.31 ERECTILE DYSFUNCTION FOLLOWING RADICAL PROSTATECTOMY: ICD-10-CM

## 2024-10-07 DIAGNOSIS — Z12.11 COLON CANCER SCREENING: ICD-10-CM

## 2024-10-07 RX ORDER — METFORMIN HYDROCHLORIDE 500 MG/1
500 TABLET ORAL
Start: 2024-10-07

## 2024-10-07 RX ORDER — FLUTICASONE PROPIONATE 50 MCG
1 SPRAY, SUSPENSION (ML) NASAL DAILY PRN
COMMUNITY

## 2024-10-07 RX ORDER — PANTOPRAZOLE SODIUM 40 MG/1
40 TABLET, DELAYED RELEASE ORAL EVERY MORNING
COMMUNITY

## 2024-10-07 RX ORDER — AMOXICILLIN 500 MG
1 CAPSULE ORAL EVERY MORNING
COMMUNITY

## 2024-10-07 NOTE — ASSESSMENT & PLAN NOTE
Lab Results   Component Value Date    HGBA1C 7.1 (H) 08/12/2024     Urine micro 10/2023.   Foot exam today  Eye exam with dr. [facility]. Will request    On metformin and statin and acei.  on mounjaro 2.5mg. tolerating well.     Will decrease metformin 500mg once daily.  Will plan on labs in 1 month. Will call with results when available.  If A1c <6.5 or better, can stop metformin.      Contact clinic for concerns. Patient is agreeable to plan and verbalized understanding

## 2024-10-07 NOTE — PROGRESS NOTES
Internal Medicine      Patient ID: [id number]     Chief Complaint: Medicare Annual Wellness     HPI:     Zohaib Moon is a 69 y.o. male here today for a Medicare Annual Wellness visit and comprehensive Health Risk Assessment.     presents to the clinic unaccompanied for his wellness visit.  He is due for labs      Did calcium score with dr. Mendenhall.  Saw nodule.  He ordered cxr and referred him to dr. Beal.  Saw him and ordered ct chest. Done 10/1/24.  Showed 5mm ground glass and solid nodule in RUL.  Not sure when follow up. Will call their office to follow up.      The patient has elevated PSA. He was seeing Dr. Hernandez in [place]. he was diagnosed with prostate cancer 2019. Had prostate removal with dr. rambo adair at [facility] - robotic surgery. Does not see him anymore. had radiation. He sees dr. Cannon locally.  He checks his psa.  Has appt 10/2024.  Sees him q3-6 months.      Patient has diabetes. He has not been checking his blood sugars however he reports compliance with his medication (metformin 500mg bid). His last foot exam was done today. urine micro 10/2023. He had his eye exam done at Dr. Mcleod[facility]. A1c 7.1 10/2023.  Tried ozempic first.  Took x 4 weeks. Caused nausea.  Then was able to get mounjaro from pharmacy.  Is tolerating this much better.  Is on  Denies history of thyroid cancer.      He has hypertension and hyperlipidemia. He is compliant with his medications. He is on asa.  Not seeing cards locally.     C/o knee OA. Does injections with dr. hollis.      He is overweight. has been riding bike. doing cardio and weights. Lost 12# since lov.      He is not ALLERGIC to any medications. He drinks alcohol rarely. He does not smoke. He had a colonoscopy done with Dr. Ivory 3/2017 and 11/2021. Repeat due in 5 years.       Declines immunizations today.          Health Maintenance         Date Due Completion Date    RSV Vaccine (Age 60+ and Pregnant patients) (1 - Risk 60-74 years 1-dose series)  Never done ---    COVID-19 Vaccine (5 - 2024-25 season) 09/01/2024 11/1/2022    Diabetes Urine Screening 10/06/2024 10/6/2023    Lipid Panel 10/06/2024 10/6/2023    Eye Exam 10/06/2024 10/6/2023    Influenza Vaccine (1) 06/30/2025 (Originally 9/1/2024) 10/25/2023    Pneumococcal Vaccines (Age 65+) (2 of 2 - PCV) 10/07/2025 (Originally 10/26/2017) 10/26/2016    Hemoglobin A1c 02/12/2025 8/12/2024    Low Dose Statin 10/07/2025 10/7/2024    Foot Exam 10/07/2025 10/7/2024    Colorectal Cancer Screening 11/29/2026 11/29/2021    TETANUS VACCINE 12/03/2029 12/3/2019             Past Medical History:   Diagnosis Date    Family history of heart disease 03/18/2020    History of coronary angiogram 03/18/2020    Personal history of colonic polyps         Past Surgical History:   Procedure Laterality Date    APPENDECTOMY      LIPOMA RESECTION  2018    PROSTATE SURGERY      ROBOT-ASSISTED LAPAROSCOPIC PELVIC LYMPHADENECTOMY USING DA AISHA XI  05/06/2020    Procedure: XI ROBOTIC LYMPHADENECTOMY, PELVIC;  Surgeon: Ti Maier MD;  Location: [facility] OR [address] [place] [address];  Service: Urology;;    ROBOT-ASSISTED LAPAROSCOPIC PROSTATECTOMY USING DA AISHA XI N/A 05/06/2020    Procedure: XI ROBOTIC PROSTATECTOMY;  Surgeon: Ti Maier MD;  Location: [facility] OR [address] [place] [address];  Service: Urology;  Laterality: N/A;  3hrs gen with regional      TRUS Bx  01/15/2020    VASECTOMY          Social History     Socioeconomic History    Marital status:      Spouse name: Shilpa    Number of children: 2    Years of education: 16    Highest education level: Bachelor's degree (e.g., BA, AB, BS)   Tobacco Use    Smoking status: Never    Smokeless tobacco: Never   Substance and Sexual Activity    Alcohol use: Yes     Alcohol/week: 3.0 standard drinks of alcohol     Types: 3 Shots of liquor per week     Comment: 3-4 drinks a week    Drug use: Never    Sexual activity: Yes     Partners: Female     Birth control/protection: Partner-Vasectomy     Social Drivers of  Health     Financial Resource Strain: Low Risk  (10/5/2024)    Overall Financial Resource Strain (CARDIA)     Difficulty of Paying Living Expenses: Not hard at all   Food Insecurity: No Food Insecurity (10/5/2024)    Hunger Vital Sign     Worried About Running Out of Food in the Last Year: Never true     Ran Out of Food in the Last Year: Never true   Transportation Needs: Unknown (2/11/2020)    PRAPARE - Transportation     Lack of Transportation (Medical): Patient declined     Lack of Transportation (Non-Medical): Patient declined   Physical Activity: Sufficiently Active (10/5/2024)    Exercise Vital Sign     Days of Exercise per Week: 5 days     Minutes of Exercise per Session: 130 min   Stress: No Stress Concern Present (10/5/2024)    [employer] [place] [employer] - Occupational Stress Questionnaire     Feeling of Stress : Not at all   Housing Stability: Unknown (10/5/2024)    Housing Stability Vital Sign     Unable to Pay for Housing in the Last Year: No        Family History   Problem Relation Name Age of Onset    Heart disease Father Gómez Moon     Kidney disease Father Gómez Moon     Diabetes Father Gómez Moon     Arthritis Father Gómez Moon     Hypertension Father Gómez Moon     Heart disease Mother Brandi Moon         Current Outpatient Medications   Medication Instructions    aspirin (ECOTRIN) 81 mg, Daily    atorvastatin (LIPITOR) 20 mg, Oral    carvediloL (COREG) 6.25 mg, Oral, 2 times daily    fluticasone propionate (FLONASE) 50 mcg/actuation nasal spray 1 spray, Daily PRN    lisinopriL (PRINIVIL,ZESTRIL) 20 mg, Oral    metFORMIN (GLUCOPHAGE) 500 mg, Oral, With breakfast    MOUNJARO 2.5 mg, Subcutaneous, Every 7 days    multivitamin (THERAGRAN) per tablet 1 tablet, Nightly    omega-3 fatty acids/fish oil (FISH OIL-OMEGA-3 FATTY ACIDS) 300-1,000 mg capsule 1 capsule, Every morning    OMEGA-3-DHA-EPA-FISH OIL ORAL 1,000 mg    pantoprazole (PROTONIX) 40 mg, Every morning    tadalafiL (CIALIS) 20 mg,  Daily    ubidecarenone (COQ-10 ORAL) capsule       Review of patient's allergies indicates:  No Known Allergies     Immunization History   Administered Date(s) Administered    COVID-19, MRNA, LN-S, PF (Pfizer) (Purple Cap) 02/05/2021, 02/26/2021, 08/18/2021    COVID-19, mRNA, LNP-S, bivalent booster, PF (PFIZER OMICRON) 11/01/2022    Influenza 10/17/2009, 10/15/2010, 10/11/2011, 09/30/2021    Influenza (FLUAD) - Quadrivalent - Adjuvanted - PF *Preferred* (65+) 10/05/2022    Influenza - Quadrivalent - High Dose - PF (65 years and older) 10/25/2023    Influenza - Quadrivalent - MDCK - PF 10/17/2018, 10/12/2019    Influenza - Quadrivalent - PF *Preferred* (6 months and older) 11/04/2020    Influenza - Trivalent - Afluria, Fluzone MDV 10/17/2009, 10/15/2010, 10/11/2011, 10/01/2023    Influenza - Trivalent - Fluarix, Flulaval, Fluzone, Afluria - PF 10/25/2016, 10/26/2017, 09/30/2021    Pneumococcal Polysaccharide - 23 Valent 10/26/2016    Tdap 12/03/2019    Zoster Recombinant 11/01/2022, 04/08/2024        Patient Care Team:  Yaritza Levy MD as PCP - General (Family Medicine)  Avila Mcleod MD as Consulting Physician (Ophthalmology)  Marcelino Ivory MD as Consulting Physician (Gastroenterology)  Franklin Cannon MD as Consulting Physician (Urology)  Tomas Villa MD as Consulting Physician (Orthopedic Surgery)  Marcelino Monroy II, MD as Consulting Physician (Radiation Oncology)    Subjective:     Review of Systems   Constitutional: Negative.    HENT: Negative.     Eyes: Negative.    Respiratory: Negative.     Cardiovascular: Negative.    Gastrointestinal: Negative.    Endocrine: Negative.    Genitourinary: Negative.    Musculoskeletal: Negative.    Skin: Negative.    Allergic/Immunologic: Negative.    Neurological: Negative.    Hematological: Negative.    Psychiatric/Behavioral: Negative.     All other systems reviewed and are negative.      12 point review of systems conducted, negative except as  "stated in the history of present illness. See HPI for details.    Objective:     Visit Vitals  /80 (BP Location: Left arm, Patient Position: Sitting)   Pulse 77   Temp 97.9 °F (36.6 °C) (Temporal)   Resp 16   Ht 5' 10" (1.778 m)   Wt 93.1 kg (205 lb 3.2 oz)   SpO2 98%   BMI 29.44 kg/m²       Physical Exam  Vitals and nursing note reviewed.   Constitutional:       Appearance: Normal appearance. He is normal weight.   HENT:      Head: Normocephalic.      Nose: Nose normal.      Mouth/Throat:      Mouth: Mucous membranes are moist.      Pharynx: Oropharynx is clear.   Eyes:      Extraocular Movements: Extraocular movements intact.   Cardiovascular:      Rate and Rhythm: Normal rate and regular rhythm.      Pulses:           Dorsalis pedis pulses are 2+ on the right side and 2+ on the left side.        Posterior tibial pulses are 2+ on the right side and 2+ on the left side.   Pulmonary:      Effort: Pulmonary effort is normal.      Breath sounds: Normal breath sounds.   Musculoskeletal:         General: Normal range of motion.        Feet:    Feet:      Right foot:      Protective Sensation: 8 sites tested.  8 sites sensed.      Skin integrity: Skin integrity normal.      Left foot:      Protective Sensation: 8 sites tested.  8 sites sensed.      Skin integrity: Skin integrity normal.      Comments: Discolored right great toenail.   Skin:     General: Skin is warm and dry.   Neurological:      General: No focal deficit present.      Mental Status: He is alert and oriented to person, place, and time. Mental status is at baseline.   Psychiatric:         Mood and Affect: Mood normal.         Assessment:       ICD-10-CM ICD-9-CM   1. Wellness examination  Z00.00 V70.0   2. Advanced care planning/counseling discussion  Z71.89 V65.49   3. Type 2 diabetes mellitus without complication, without long-term current use of insulin  E11.9 250.00   4. Hypertension associated with diabetes  E11.59 250.80    I15.2 401.9   5. " Hyperlipidemia associated with type 2 diabetes mellitus  E11.69 250.80    E78.5 272.4   6. Prostate cancer  C61 185   7. Erectile dysfunction following radical prostatectomy  N52.31 607.84   8. Colon cancer screening  Z12.11 V76.51        Plan:     1. Wellness examination  Assessment & Plan:  Fasting labs ordered. Will call with results when available  psa with urology  Colonoscopy with dr. alcala 11/2021 with repeat due in 5 years    Advanced care planning discussed and paperwork given.     Orders:  -     CBC Auto Differential; Future; Expected date: 10/07/2024  -     Comprehensive Metabolic Panel; Future; Expected date: 10/07/2024  -     Lipid Panel; Future; Expected date: 10/07/2024  -     TSH; Future; Expected date: 10/07/2024  -     Hemoglobin A1C; Future; Expected date: 10/07/2024  -     Urinalysis; Future; Expected date: 10/07/2024  -     Microalbumin/Creatinine Ratio, Urine; Future; Expected date: 10/07/2024    2. Advanced care planning/counseling discussion  Assessment & Plan:  Advanced care planning discussed and paperwork given.    I attest that I have had a face to face discussion with patient and or surrogate decision maker.   Included surrogate decision maker: NO  Advanced directive in chart: NO  LAPOST: NO    Total time spent: 16 minutes      Orders:  -     CBC Auto Differential; Future; Expected date: 10/07/2024  -     Comprehensive Metabolic Panel; Future; Expected date: 10/07/2024  -     Lipid Panel; Future; Expected date: 10/07/2024  -     TSH; Future; Expected date: 10/07/2024  -     Hemoglobin A1C; Future; Expected date: 10/07/2024  -     Urinalysis; Future; Expected date: 10/07/2024  -     Microalbumin/Creatinine Ratio, Urine; Future; Expected date: 10/07/2024    3. Type 2 diabetes mellitus without complication, without long-term current use of insulin  Assessment & Plan:  Lab Results   Component Value Date    HGBA1C 7.1 (H) 08/12/2024     Urine micro 10/2023.   Foot exam today  Eye exam  "with dr. Mcleod. Will request    On metformin and statin and acei.  on mounjaro 2.5mg. tolerating well.     Will decrease metformin 500mg once daily.  Will plan on labs in 1 month. Will call with results when available.  If A1c <6.5 or better, can stop metformin.      Contact clinic for concerns. Patient is agreeable to plan and verbalized understanding    Orders:  -     CBC Auto Differential; Future; Expected date: 10/07/2024  -     Comprehensive Metabolic Panel; Future; Expected date: 10/07/2024  -     Lipid Panel; Future; Expected date: 10/07/2024  -     TSH; Future; Expected date: 10/07/2024  -     Hemoglobin A1C; Future; Expected date: 10/07/2024  -     Urinalysis; Future; Expected date: 10/07/2024  -     Microalbumin/Creatinine Ratio, Urine; Future; Expected date: 10/07/2024    4. Hypertension associated with diabetes  Assessment & Plan:  Well controlled on current prescription. On asa    Orders:  -     CBC Auto Differential; Future; Expected date: 10/07/2024  -     Comprehensive Metabolic Panel; Future; Expected date: 10/07/2024  -     Lipid Panel; Future; Expected date: 10/07/2024  -     TSH; Future; Expected date: 10/07/2024  -     Hemoglobin A1C; Future; Expected date: 10/07/2024  -     Urinalysis; Future; Expected date: 10/07/2024  -     Microalbumin/Creatinine Ratio, Urine; Future; Expected date: 10/07/2024    5. Hyperlipidemia associated with type 2 diabetes mellitus  Assessment & Plan:  On statin and fish oil    Lab Results   Component Value Date    CHOL 184 10/06/2023    CHOL 154 10/05/2022    CHOL 172 09/28/2021     Lab Results   Component Value Date    HDL 44 10/06/2023    HDL 37 10/05/2022    HDL 45 09/28/2021     No results found for: "LDLCALC"  Lab Results   Component Value Date    TRIG 134 10/06/2023    TRIG 95 10/05/2022    TRIG 108 09/28/2021       No results found for: "CHOLHDL"      Orders:  -     CBC Auto Differential; Future; Expected date: 10/07/2024  -     Comprehensive Metabolic Panel; " Future; Expected date: 10/07/2024  -     Lipid Panel; Future; Expected date: 10/07/2024  -     TSH; Future; Expected date: 10/07/2024  -     Hemoglobin A1C; Future; Expected date: 10/07/2024  -     Urinalysis; Future; Expected date: 10/07/2024  -     Microalbumin/Creatinine Ratio, Urine; Future; Expected date: 10/07/2024    6. Prostate cancer  Assessment & Plan:  Dx 2019.  S/p surgery. no radiation. On no meds.  Keep appts with dr. Cannon. He orders his psa    Orders:  -     CBC Auto Differential; Future; Expected date: 10/07/2024  -     Comprehensive Metabolic Panel; Future; Expected date: 10/07/2024  -     Lipid Panel; Future; Expected date: 10/07/2024  -     TSH; Future; Expected date: 10/07/2024  -     Hemoglobin A1C; Future; Expected date: 10/07/2024  -     Urinalysis; Future; Expected date: 10/07/2024  -     Microalbumin/Creatinine Ratio, Urine; Future; Expected date: 10/07/2024    7. Erectile dysfunction following radical prostatectomy  Assessment & Plan:  On cialis per urology    Orders:  -     CBC Auto Differential; Future; Expected date: 10/07/2024  -     Comprehensive Metabolic Panel; Future; Expected date: 10/07/2024  -     Lipid Panel; Future; Expected date: 10/07/2024  -     TSH; Future; Expected date: 10/07/2024  -     Hemoglobin A1C; Future; Expected date: 10/07/2024  -     Urinalysis; Future; Expected date: 10/07/2024  -     Microalbumin/Creatinine Ratio, Urine; Future; Expected date: 10/07/2024    8. Colon cancer screening  Assessment & Plan:  Colonoscopy 11/2021 with dr. alcala with repeat due in 5 years    Orders:  -     CBC Auto Differential; Future; Expected date: 10/07/2024  -     Comprehensive Metabolic Panel; Future; Expected date: 10/07/2024  -     Lipid Panel; Future; Expected date: 10/07/2024  -     TSH; Future; Expected date: 10/07/2024  -     Hemoglobin A1C; Future; Expected date: 10/07/2024  -     Urinalysis; Future; Expected date: 10/07/2024  -     Microalbumin/Creatinine Ratio,  Urine; Future; Expected date: 10/07/2024    Other orders  -     metFORMIN (GLUCOPHAGE) 500 MG tablet; Take 1 tablet (500 mg total) by mouth daily with breakfast.         A comprehensive HEALTH RISK ASSESSMENT was completed today. Results are summarized below:    There are NO EMOTIONAL/SOCIAL CONCERNS identified on today's screening for Social Isolation, Depression and Anxiety.    There are NO COGNITIVE FUNCTION CONCERNS identified on today's screening.  There are NO FUNCTIONAL OR SAFETY CONCERNS were identified on today's screening for Physical Symptoms, Nutritional, Cognitive Function, Home Safety/Living Situation, Fall Risk, Activities of Daily Living, Independent Activities of Daily Living, Physical Activity, Timed Up and Go test and Whisper test.   The patient reports NO OPIOID PRESCRIPTIONS. This was confirmed through medication reconciliation and the [facility] website.    The patient is NOT A TOBACCO USER.  The patient reports NO SIGNIFICANT ALCOHOL USE.     All Questions regarding food, transportation or housing were not answered today.    The patient was asked and declined the use of a free .    Advance Care Planning   Today we discussed advance care planning. He is interested in learning more about how to make Advance Directives. Information was provided and I offered to discuss more at his discretion.         Provided patient with a 5-10 year written screening schedule and personal prevention plan. Recommendations were developed using the USPSTF age appropriate recommendations. Education, counseling, and referrals were provided as needed. After Visit Summary printed and given to patient, which includes a list of additional screenings\tests needed.    Follow up in about 6 months (around 4/7/2025) for diabetes with labs. In addition to their scheduled follow up, the patient has also been instructed to follow up on as needed basis.     No future appointments.     Yaritza Levy MD

## 2024-10-15 LAB
LEFT EYE DM RETINOPATHY: NEGATIVE
RIGHT EYE DM RETINOPATHY: NEGATIVE

## 2024-10-17 ENCOUNTER — DOCUMENTATION ONLY (OUTPATIENT)
Dept: FAMILY MEDICINE | Facility: CLINIC | Age: 69
End: 2024-10-17
Payer: MEDICARE

## 2024-10-18 ENCOUNTER — TELEPHONE (OUTPATIENT)
Dept: FAMILY MEDICINE | Facility: CLINIC | Age: 69
End: 2024-10-18
Payer: MEDICARE

## 2024-10-18 DIAGNOSIS — E11.9 TYPE 2 DIABETES MELLITUS WITHOUT COMPLICATION, WITHOUT LONG-TERM CURRENT USE OF INSULIN: Primary | ICD-10-CM

## 2024-10-18 RX ORDER — TIRZEPATIDE 5 MG/.5ML
5 INJECTION, SOLUTION SUBCUTANEOUS
Qty: 2 ML | Refills: 2 | Status: SHIPPED | OUTPATIENT
Start: 2024-10-18 | End: 2025-01-16

## 2024-10-18 NOTE — TELEPHONE ENCOUNTER
----- Message from Liborio sent at 10/18/2024  9:42 AM CDT -----  Who Called: Zohaib Moon    Caller is requesting assistance/information from provider's office.    Symptoms (please be specific): weight loss   How long has patient had these symptoms:    List of preferred pharmacies on file (remove unneeded): [unfilled]  If different, enter pharmacy into here including location and phone number:       Preferred Method of Contact: Phone Call  Patient's Preferred Phone Number on File: [phone]   Best Call Back Number, if different:  Additional Information: Pt is requesting a cb in regards to increasing dosage of medication tirzepatide (MOUNJARO) 2.5 mg/0.5 mL PnIj

## 2024-10-18 NOTE — TELEPHONE ENCOUNTER
Patient requesting dose increase of mounjaro. Currently on 2.5 mg weekly. Has completed at least 4 doses of 2.5 mg

## 2024-10-19 NOTE — TELEPHONE ENCOUNTER
I have signed for the following orders AND/OR meds.  Please call the patient and ask the patient to schedule the testing AND/OR inform about any medications that were sent.      Medications Ordered This Encounter   Medications    tirzepatide (MOUNJARO) 5 mg/0.5 mL PnIj     Sig: Inject 5 mg into the skin every 7 days.     Dispense:  2 mL     Refill:  2

## 2024-11-26 ENCOUNTER — LAB VISIT (OUTPATIENT)
Dept: LAB | Facility: HOSPITAL | Age: 69
End: 2024-11-26
Attending: FAMILY MEDICINE
Payer: MEDICARE

## 2024-11-26 ENCOUNTER — PATIENT MESSAGE (OUTPATIENT)
Dept: FAMILY MEDICINE | Facility: CLINIC | Age: 69
End: 2024-11-26
Payer: MEDICARE

## 2024-11-26 DIAGNOSIS — Z12.11 COLON CANCER SCREENING: ICD-10-CM

## 2024-11-26 DIAGNOSIS — I15.2 HYPERTENSION ASSOCIATED WITH DIABETES: ICD-10-CM

## 2024-11-26 DIAGNOSIS — E11.59 HYPERTENSION ASSOCIATED WITH DIABETES: ICD-10-CM

## 2024-11-26 DIAGNOSIS — E78.5 HYPERLIPIDEMIA ASSOCIATED WITH TYPE 2 DIABETES MELLITUS: ICD-10-CM

## 2024-11-26 DIAGNOSIS — Z71.89 ADVANCED CARE PLANNING/COUNSELING DISCUSSION: ICD-10-CM

## 2024-11-26 DIAGNOSIS — E11.9 TYPE 2 DIABETES MELLITUS WITHOUT COMPLICATION, WITHOUT LONG-TERM CURRENT USE OF INSULIN: ICD-10-CM

## 2024-11-26 DIAGNOSIS — C61 PROSTATE CANCER: ICD-10-CM

## 2024-11-26 DIAGNOSIS — E11.69 HYPERLIPIDEMIA ASSOCIATED WITH TYPE 2 DIABETES MELLITUS: ICD-10-CM

## 2024-11-26 DIAGNOSIS — Z00.00 WELLNESS EXAMINATION: ICD-10-CM

## 2024-11-26 DIAGNOSIS — N52.31 ERECTILE DYSFUNCTION FOLLOWING RADICAL PROSTATECTOMY: ICD-10-CM

## 2024-11-26 DIAGNOSIS — E11.9 TYPE 2 DIABETES MELLITUS WITHOUT COMPLICATION, WITHOUT LONG-TERM CURRENT USE OF INSULIN: Primary | ICD-10-CM

## 2024-11-26 LAB
ALBUMIN SERPL-MCNC: 3.7 G/DL (ref 3.4–4.8)
ALBUMIN/GLOB SERPL: 1.2 RATIO (ref 1.1–2)
ALP SERPL-CCNC: 59 UNIT/L (ref 40–150)
ALT SERPL-CCNC: 16 UNIT/L (ref 0–55)
ANION GAP SERPL CALC-SCNC: 7 MEQ/L
AST SERPL-CCNC: 16 UNIT/L (ref 5–34)
BASOPHILS # BLD AUTO: 0.04 X10(3)/MCL
BASOPHILS NFR BLD AUTO: 0.7 %
BILIRUB SERPL-MCNC: 0.8 MG/DL
BUN SERPL-MCNC: 21 MG/DL (ref 8.4–25.7)
CALCIUM SERPL-MCNC: 9.9 MG/DL (ref 8.8–10)
CHLORIDE SERPL-SCNC: 107 MMOL/L (ref 98–107)
CHOLEST SERPL-MCNC: 182 MG/DL
CHOLEST/HDLC SERPL: 4 {RATIO} (ref 0–5)
CO2 SERPL-SCNC: 29 MMOL/L (ref 23–31)
CREAT SERPL-MCNC: 0.95 MG/DL (ref 0.72–1.25)
CREAT UR-MCNC: 474.7 MG/DL (ref 63–166)
CREAT/UREA NIT SERPL: 22
EOSINOPHIL # BLD AUTO: 0.17 X10(3)/MCL (ref 0–0.9)
EOSINOPHIL NFR BLD AUTO: 2.9 %
ERYTHROCYTE [DISTWIDTH] IN BLOOD BY AUTOMATED COUNT: 13.7 % (ref 11.5–17)
EST. AVERAGE GLUCOSE BLD GHB EST-MCNC: 154.2 MG/DL
GFR SERPLBLD CREATININE-BSD FMLA CKD-EPI: >60 ML/MIN/1.73/M2
GLOBULIN SER-MCNC: 3.2 GM/DL (ref 2.4–3.5)
GLUCOSE SERPL-MCNC: 152 MG/DL (ref 82–115)
HBA1C MFR BLD: 7 %
HCT VFR BLD AUTO: 45.1 % (ref 42–52)
HDLC SERPL-MCNC: 49 MG/DL (ref 35–60)
HGB BLD-MCNC: 14.8 G/DL (ref 14–18)
IMM GRANULOCYTES # BLD AUTO: 0.02 X10(3)/MCL (ref 0–0.04)
IMM GRANULOCYTES NFR BLD AUTO: 0.3 %
LDLC SERPL CALC-MCNC: 114 MG/DL (ref 50–140)
LYMPHOCYTES # BLD AUTO: 1.88 X10(3)/MCL (ref 0.6–4.6)
LYMPHOCYTES NFR BLD AUTO: 31.8 %
MCH RBC QN AUTO: 29.5 PG (ref 27–31)
MCHC RBC AUTO-ENTMCNC: 32.8 G/DL (ref 33–36)
MCV RBC AUTO: 90 FL (ref 80–94)
MICROALBUMIN UR-MCNC: 23.3 UG/ML
MICROALBUMIN/CREAT RATIO PNL UR: 4.9 MG/GM CR (ref 0–30)
MONOCYTES # BLD AUTO: 0.64 X10(3)/MCL (ref 0.1–1.3)
MONOCYTES NFR BLD AUTO: 10.8 %
NEUTROPHILS # BLD AUTO: 3.17 X10(3)/MCL (ref 2.1–9.2)
NEUTROPHILS NFR BLD AUTO: 53.5 %
NRBC BLD AUTO-RTO: 0 %
PLATELET # BLD AUTO: 256 X10(3)/MCL (ref 130–400)
PMV BLD AUTO: 8.8 FL (ref 7.4–10.4)
POTASSIUM SERPL-SCNC: 4.4 MMOL/L (ref 3.5–5.1)
PROT SERPL-MCNC: 6.9 GM/DL (ref 5.8–7.6)
RBC # BLD AUTO: 5.01 X10(6)/MCL (ref 4.7–6.1)
SODIUM SERPL-SCNC: 143 MMOL/L (ref 136–145)
TRIGL SERPL-MCNC: 93 MG/DL (ref 34–140)
TSH SERPL-ACNC: 1.02 UIU/ML (ref 0.35–4.94)
VLDLC SERPL CALC-MCNC: 19 MG/DL
WBC # BLD AUTO: 5.92 X10(3)/MCL (ref 4.5–11.5)

## 2024-11-26 PROCEDURE — 84443 ASSAY THYROID STIM HORMONE: CPT

## 2024-11-26 PROCEDURE — 82570 ASSAY OF URINE CREATININE: CPT

## 2024-11-26 PROCEDURE — 83036 HEMOGLOBIN GLYCOSYLATED A1C: CPT

## 2024-11-26 PROCEDURE — 80053 COMPREHEN METABOLIC PANEL: CPT

## 2024-11-26 PROCEDURE — 80061 LIPID PANEL: CPT

## 2024-11-26 PROCEDURE — 36415 COLL VENOUS BLD VENIPUNCTURE: CPT

## 2024-11-26 PROCEDURE — 85025 COMPLETE CBC W/AUTO DIFF WBC: CPT

## 2024-11-26 NOTE — TELEPHONE ENCOUNTER
Please see my recs on his lab results        I have signed for the following orders AND/OR meds.  Please call the patient and ask the patient to schedule the testing AND/OR inform about any medications that were sent.        Medications Ordered This Encounter   Medications    tirzepatide 7.5 mg/0.5 mL PnIj     Sig: Inject 7.5 mg into the skin every 7 days.     Dispense:  2 mL     Refill:  2

## 2024-11-26 NOTE — PROGRESS NOTES
Please inform patient of results.    1. Spot glucose is 152 and A1c was 7.0- improved from previous  2. Urine creatinine is up. Is on lisinopril. Encourage fluids. monitor      Other labwork within acceptable ranges.

## 2025-02-04 RX ORDER — ATORVASTATIN CALCIUM 20 MG/1
20 TABLET, FILM COATED ORAL DAILY
Qty: 90 TABLET | Refills: 0 | Status: SHIPPED | OUTPATIENT
Start: 2025-02-04

## 2025-03-03 ENCOUNTER — PATIENT MESSAGE (OUTPATIENT)
Dept: FAMILY MEDICINE | Facility: CLINIC | Age: 70
End: 2025-03-03
Payer: MEDICARE

## 2025-03-03 RX ORDER — TIRZEPATIDE 7.5 MG/.5ML
7.5 INJECTION, SOLUTION SUBCUTANEOUS WEEKLY
Qty: 2 ML | Refills: 0 | Status: SHIPPED | OUTPATIENT
Start: 2025-03-03 | End: 2025-04-02

## 2025-03-03 RX ORDER — TIRZEPATIDE 7.5 MG/.5ML
INJECTION, SOLUTION SUBCUTANEOUS
COMMUNITY
Start: 2025-02-04 | End: 2025-03-03 | Stop reason: SDUPTHER

## 2025-03-17 ENCOUNTER — PATIENT MESSAGE (OUTPATIENT)
Dept: FAMILY MEDICINE | Facility: CLINIC | Age: 70
End: 2025-03-17
Payer: MEDICARE

## 2025-03-17 DIAGNOSIS — E11.9 TYPE 2 DIABETES MELLITUS WITHOUT COMPLICATION, WITHOUT LONG-TERM CURRENT USE OF INSULIN: Primary | ICD-10-CM

## 2025-03-17 RX ORDER — TIRZEPATIDE 10 MG/.5ML
10 INJECTION, SOLUTION SUBCUTANEOUS
Qty: 2 ML | Refills: 0 | Status: SHIPPED | OUTPATIENT
Start: 2025-03-17 | End: 2025-04-16

## 2025-04-01 RX ORDER — LISINOPRIL 20 MG/1
20 TABLET ORAL
Qty: 90 TABLET | Refills: 3 | Status: SHIPPED | OUTPATIENT
Start: 2025-04-01

## 2025-04-01 RX ORDER — METFORMIN HYDROCHLORIDE 500 MG/1
500 TABLET ORAL 2 TIMES DAILY WITH MEALS
Qty: 180 TABLET | Refills: 3 | Status: SHIPPED | OUTPATIENT
Start: 2025-04-01

## 2025-04-08 ENCOUNTER — LAB VISIT (OUTPATIENT)
Dept: LAB | Facility: HOSPITAL | Age: 70
End: 2025-04-08
Attending: FAMILY MEDICINE
Payer: MEDICARE

## 2025-04-08 ENCOUNTER — PATIENT MESSAGE (OUTPATIENT)
Dept: FAMILY MEDICINE | Facility: CLINIC | Age: 70
End: 2025-04-08

## 2025-04-08 ENCOUNTER — RESULTS FOLLOW-UP (OUTPATIENT)
Dept: FAMILY MEDICINE | Facility: CLINIC | Age: 70
End: 2025-04-08

## 2025-04-08 ENCOUNTER — OFFICE VISIT (OUTPATIENT)
Dept: FAMILY MEDICINE | Facility: CLINIC | Age: 70
End: 2025-04-08
Payer: MEDICARE

## 2025-04-08 VITALS
WEIGHT: 202 LBS | SYSTOLIC BLOOD PRESSURE: 128 MMHG | HEART RATE: 60 BPM | BODY MASS INDEX: 28.92 KG/M2 | OXYGEN SATURATION: 98 % | HEIGHT: 70 IN | RESPIRATION RATE: 16 BRPM | TEMPERATURE: 98 F | DIASTOLIC BLOOD PRESSURE: 82 MMHG

## 2025-04-08 DIAGNOSIS — I15.2 HYPERTENSION ASSOCIATED WITH DIABETES: ICD-10-CM

## 2025-04-08 DIAGNOSIS — E11.9 TYPE 2 DIABETES MELLITUS WITHOUT COMPLICATION, WITHOUT LONG-TERM CURRENT USE OF INSULIN: Primary | ICD-10-CM

## 2025-04-08 DIAGNOSIS — E11.59 HYPERTENSION ASSOCIATED WITH DIABETES: ICD-10-CM

## 2025-04-08 DIAGNOSIS — E78.5 HYPERLIPIDEMIA ASSOCIATED WITH TYPE 2 DIABETES MELLITUS: ICD-10-CM

## 2025-04-08 DIAGNOSIS — Z00.00 WELLNESS EXAMINATION: ICD-10-CM

## 2025-04-08 DIAGNOSIS — E11.69 HYPERLIPIDEMIA ASSOCIATED WITH TYPE 2 DIABETES MELLITUS: ICD-10-CM

## 2025-04-08 DIAGNOSIS — E11.9 TYPE 2 DIABETES MELLITUS WITHOUT COMPLICATION, WITHOUT LONG-TERM CURRENT USE OF INSULIN: ICD-10-CM

## 2025-04-08 PROBLEM — E66.09 CLASS 1 OBESITY DUE TO EXCESS CALORIES WITH SERIOUS COMORBIDITY AND BODY MASS INDEX (BMI) OF 31.0 TO 31.9 IN ADULT: Status: RESOLVED | Noted: 2020-03-18 | Resolved: 2025-04-08

## 2025-04-08 PROBLEM — E66.811 CLASS 1 OBESITY DUE TO EXCESS CALORIES WITH SERIOUS COMORBIDITY AND BODY MASS INDEX (BMI) OF 31.0 TO 31.9 IN ADULT: Status: RESOLVED | Noted: 2020-03-18 | Resolved: 2025-04-08

## 2025-04-08 LAB
ALBUMIN SERPL-MCNC: 3.8 G/DL (ref 3.4–4.8)
ALBUMIN/GLOB SERPL: 1.2 RATIO (ref 1.1–2)
ALP SERPL-CCNC: 62 UNIT/L (ref 40–150)
ALT SERPL-CCNC: 21 UNIT/L (ref 0–55)
ANION GAP SERPL CALC-SCNC: 9 MEQ/L
AST SERPL-CCNC: 15 UNIT/L (ref 11–45)
BACTERIA #/AREA URNS AUTO: ABNORMAL /HPF
BILIRUB SERPL-MCNC: 1 MG/DL
BILIRUB UR QL STRIP.AUTO: ABNORMAL
BUN SERPL-MCNC: 20.4 MG/DL (ref 8.4–25.7)
CALCIUM SERPL-MCNC: 9.7 MG/DL (ref 8.8–10)
CHLORIDE SERPL-SCNC: 106 MMOL/L (ref 98–107)
CLARITY UR: CLEAR
CO2 SERPL-SCNC: 28 MMOL/L (ref 23–31)
COLOR UR AUTO: ABNORMAL
CREAT SERPL-MCNC: 1.08 MG/DL (ref 0.72–1.25)
CREAT/UREA NIT SERPL: 19
EST. AVERAGE GLUCOSE BLD GHB EST-MCNC: 191.5 MG/DL
GFR SERPLBLD CREATININE-BSD FMLA CKD-EPI: >60 ML/MIN/1.73/M2
GLOBULIN SER-MCNC: 3.3 GM/DL (ref 2.4–3.5)
GLUCOSE SERPL-MCNC: 220 MG/DL (ref 82–115)
GLUCOSE UR QL STRIP: NEGATIVE
HBA1C MFR BLD: 8.3 %
HGB UR QL STRIP: NEGATIVE
KETONES UR QL STRIP: NEGATIVE
LEUKOCYTE ESTERASE UR QL STRIP: NEGATIVE
MUCOUS THREADS URNS QL MICRO: ABNORMAL /LPF
NITRITE UR QL STRIP: NEGATIVE
PH UR STRIP: 5.5 [PH]
POTASSIUM SERPL-SCNC: 4.3 MMOL/L (ref 3.5–5.1)
PROT SERPL-MCNC: 7.1 GM/DL (ref 5.8–7.6)
PROT UR QL STRIP: ABNORMAL
RBC #/AREA URNS AUTO: ABNORMAL /HPF
SODIUM SERPL-SCNC: 143 MMOL/L (ref 136–145)
SP GR UR STRIP.AUTO: >=1.03 (ref 1–1.03)
SQUAMOUS #/AREA URNS AUTO: ABNORMAL /HPF
UROBILINOGEN UR STRIP-ACNC: 0.2
WBC #/AREA URNS AUTO: ABNORMAL /HPF

## 2025-04-08 PROCEDURE — 83036 HEMOGLOBIN GLYCOSYLATED A1C: CPT

## 2025-04-08 PROCEDURE — 80053 COMPREHEN METABOLIC PANEL: CPT

## 2025-04-08 PROCEDURE — 36415 COLL VENOUS BLD VENIPUNCTURE: CPT

## 2025-04-08 RX ORDER — TIRZEPATIDE 12.5 MG/.5ML
12.5 INJECTION, SOLUTION SUBCUTANEOUS
Qty: 2 ML | Refills: 2 | Status: SHIPPED | OUTPATIENT
Start: 2025-04-08 | End: 2025-07-07

## 2025-04-08 RX ORDER — METFORMIN HYDROCHLORIDE 500 MG/1
500 TABLET ORAL
Start: 2025-04-08

## 2025-04-08 NOTE — PROGRESS NOTES
Please inform patient of results.    1. Spot glucose is 220 and A1c is 8.3- up from previous and not at goal.  Would he like to increase the mounjaro or add back the metformin to bid instead of daily?    Other labwork within acceptable ranges.

## 2025-04-08 NOTE — ASSESSMENT & PLAN NOTE
"On statin and fish oil    Lab Results   Component Value Date    CHOL 182 11/26/2024    CHOL 184 10/06/2023    CHOL 154 10/05/2022     Lab Results   Component Value Date    HDL 49 11/26/2024    HDL 44 10/06/2023    HDL 37 10/05/2022     No results found for: "LDLCALC"  Lab Results   Component Value Date    TRIG 93 11/26/2024    TRIG 134 10/06/2023    TRIG 95 10/05/2022       No results found for: "CHOLHDL"    "

## 2025-04-08 NOTE — PROGRESS NOTES
Subjective:        Patient ID: Zohaib Moon is a 70 y.o. male.    Chief Complaint: Follow-up (DM follow up )      presents to the clinic unaccompanied for chronic condition follow up. He is due for his wellness visit in October. He is due for labs    Patient has diabetes. He has not been checking his blood sugars however he reports compliance with his medication (metformin 500mg daily) and mounjaro 10. His last foot exam was done 10/2024. urine micro 11/2024. He had his eye exam done at [facility]. [facility] 10/2024  A1c 7.0 11/2024.  Tried ozempic first.  Took x 4 weeks. Caused nausea.  Then was able to get mounjaro from pharmacy.  Is tolerating this much better. Denies history of thyroid cancer.      He has hypertension and hyperlipidemia. He is compliant with his medications. He is on asa.  Did calcium score with dr. Mendenhall.  Saw nodule.  He ordered cxr and referred him to dr. Beal.  Saw him and ordered ct chest. Done 10/1/24.  Showed 5mm ground glass and solid nodule in RUL.  Not sure when follow up. Will call their office to follow up.      The patient has elevated PSA. He was seeing Dr. Hernandez in [place]. he was diagnosed with prostate cancer 2019. Had prostate removal with dr. rambo adair at [facility] robotic surgery. Does not see him anymore. had radiation. He sees dr. Cannon locally.  He checks his psa.  Has appt 4/2025. Sees him q3-6 months.      C/o knee OA. Does injections with dr. hollis.      He is overweight. has been riding bike. doing cardio and weights. Lost 3# since lov.      He is not ALLERGIC to any medications. He drinks alcohol rarely. He does not smoke. He had a colonoscopy done with Dr. Ivory 3/2017 and 11/2021. Repeat due in 5 years.       Declines immunizations today.        Review of Systems   Constitutional: Negative.    HENT: Negative.     Eyes: Negative.    Respiratory: Negative.     Cardiovascular: Negative.    Gastrointestinal: Negative.    Endocrine: Negative.    Genitourinary:  "Negative.    Musculoskeletal: Negative.    Skin: Negative.    Allergic/Immunologic: Negative.    Neurological: Negative.    Hematological: Negative.    Psychiatric/Behavioral: Negative.     All other systems reviewed and are negative.        Review of patient's allergies indicates:  No Known Allergies   Vitals:    04/08/25 0936   BP: 128/82   BP Location: Left arm   Patient Position: Sitting   Pulse: 60   Resp: 16   Temp: 98.3 °F (36.8 °C)   TempSrc: Oral   SpO2: 98%   Weight: 91.6 kg (202 lb)   Height: 5' 10" (1.778 m)      Social History     Socioeconomic History    Marital status:      Spouse name: Shilpa    Number of children: 2    Years of education: 16    Highest education level: Bachelor's degree (e.g., BA, AB, BS)   Tobacco Use    Smoking status: Never    Smokeless tobacco: Never   Substance and Sexual Activity    Alcohol use: Yes     Alcohol/week: 3.0 standard drinks of alcohol     Types: 3 Shots of liquor per week     Comment: 3-4 drinks a week    Drug use: Never    Sexual activity: Yes     Partners: Female     Birth control/protection: Partner-Vasectomy     Social Drivers of Health     Financial Resource Strain: Low Risk  (10/5/2024)    Overall Financial Resource Strain (CARDIA)     Difficulty of Paying Living Expenses: Not hard at all   Food Insecurity: No Food Insecurity (10/5/2024)    Hunger Vital Sign     Worried About Running Out of Food in the Last Year: Never true     Ran Out of Food in the Last Year: Never true   Transportation Needs: Unknown (2/11/2020)    PRAPARE - Transportation     Lack of Transportation (Medical): Patient declined     Lack of Transportation (Non-Medical): Patient declined   Physical Activity: Sufficiently Active (10/5/2024)    Exercise Vital Sign     Days of Exercise per Week: 5 days     Minutes of Exercise per Session: 130 min   Stress: No Stress Concern Present (10/5/2024)    [employer] - Occupational Stress Questionnaire     Feeling of " Stress : Not at all   Housing Stability: Unknown (10/5/2024)    Housing Stability Vital Sign     Unable to Pay for Housing in the Last Year: No      Family History   Problem Relation Name Age of Onset    Heart disease Father Gómez Moon     Kidney disease Father Gómez Moon     Diabetes Father Gómez Moon     Arthritis Father Gómez Moon     Hypertension Father Gómez Moon     Heart disease Mother Brandi Moon           Objective:     Physical Exam  Vitals and nursing note reviewed.   Constitutional:       Appearance: Normal appearance. He is normal weight.   HENT:      Head: Normocephalic.      Nose: Nose normal.      Mouth/Throat:      Mouth: Mucous membranes are moist.      Pharynx: Oropharynx is clear.   Eyes:      Extraocular Movements: Extraocular movements intact.   Cardiovascular:      Rate and Rhythm: Normal rate and regular rhythm.   Pulmonary:      Effort: Pulmonary effort is normal.      Breath sounds: Normal breath sounds.   Musculoskeletal:         General: Normal range of motion.   Skin:     General: Skin is warm and dry.   Neurological:      General: No focal deficit present.      Mental Status: He is alert and oriented to person, place, and time. Mental status is at baseline.   Psychiatric:         Mood and Affect: Mood normal.       Medications Ordered Prior to Encounter[1]  Health Maintenance   Topic Date Due    COVID-19 Vaccine (5 - 2024-25 season) 09/01/2024    RSV Vaccine (Age 60+ and Pregnant patients) (1 - Risk 60-74 years 1-dose series) 04/08/2025 (Originally 2/6/2016)    Pneumococcal Vaccines (Age 50+) (2 of 2 - PCV) 10/07/2025 (Originally 10/26/2017)    Hemoglobin A1c  05/26/2025    Foot Exam  10/07/2025    Diabetic Eye Exam  10/15/2025    Diabetes Urine Screening  11/26/2025    Lipid Panel  11/26/2025    Low Dose Statin  04/08/2026    Colorectal Cancer Screening  11/29/2026    TETANUS VACCINE  12/03/2029    Hepatitis C Screening  Completed    Shingles Vaccine  Completed    Influenza Vaccine   Completed      Results for orders placed or performed in visit on 11/26/24   Comprehensive Metabolic Panel    Collection Time: 11/26/24  9:12 AM   Result Value Ref Range    Sodium 143 136 - 145 mmol/L    Potassium 4.4 3.5 - 5.1 mmol/L    Chloride 107 98 - 107 mmol/L    CO2 29 23 - 31 mmol/L    Glucose 152 (H) 82 - 115 mg/dL    Blood Urea Nitrogen 21.0 8.4 - 25.7 mg/dL    Creatinine 0.95 0.72 - 1.25 mg/dL    Calcium 9.9 8.8 - 10.0 mg/dL    Protein Total 6.9 5.8 - 7.6 gm/dL    Albumin 3.7 3.4 - 4.8 g/dL    Globulin 3.2 2.4 - 3.5 gm/dL    Albumin/Globulin Ratio 1.2 1.1 - 2.0 ratio    Bilirubin Total 0.8 <=1.5 mg/dL    ALP 59 40 - 150 unit/L    ALT 16 0 - 55 unit/L    AST 16 5 - 34 unit/L    eGFR >60 mL/min/1.73/m2    Anion Gap 7.0 mEq/L    BUN/Creatinine Ratio 22    Lipid Panel    Collection Time: 11/26/24  9:12 AM   Result Value Ref Range    Cholesterol Total 182 <=200 mg/dL    HDL Cholesterol 49 35 - 60 mg/dL    Triglyceride 93 34 - 140 mg/dL    Cholesterol/HDL Ratio 4 0 - 5    Very Low Density Lipoprotein 19     LDL Cholesterol 114.00 50.00 - 140.00 mg/dL   TSH    Collection Time: 11/26/24  9:12 AM   Result Value Ref Range    TSH 1.017 0.350 - 4.940 uIU/mL   Hemoglobin A1C    Collection Time: 11/26/24  9:12 AM   Result Value Ref Range    Hemoglobin A1c 7.0 <=7.0 %    Estimated Average Glucose 154.2 mg/dL   Microalbumin/Creatinine Ratio, Urine    Collection Time: 11/26/24  9:12 AM   Result Value Ref Range    Urine Microalbumin 23.3 <=30.0 ug/mL    Urine Creatinine 474.7 (H) 63.0 - 166.0 mg/dL    Microalbumin Creatinine Ratio 4.9 0.0 - 30.0 mg/gm Cr   CBC with Differential    Collection Time: 11/26/24  9:12 AM   Result Value Ref Range    WBC 5.92 4.50 - 11.50 x10(3)/mcL    RBC 5.01 4.70 - 6.10 x10(6)/mcL    Hgb 14.8 14.0 - 18.0 g/dL    Hct 45.1 42.0 - 52.0 %    MCV 90.0 80.0 - 94.0 fL    MCH 29.5 27.0 - 31.0 pg    MCHC 32.8 (L) 33.0 - 36.0 g/dL    RDW 13.7 11.5 - 17.0 %    Platelet 256 130 - 400 x10(3)/mcL    MPV 8.8  7.4 - 10.4 fL    Neut % 53.5 %    Lymph % 31.8 %    Mono % 10.8 %    Eos % 2.9 %    Basophil % 0.7 %    Lymph # 1.88 0.6 - 4.6 x10(3)/mcL    Neut # 3.17 2.1 - 9.2 x10(3)/mcL    Mono # 0.64 0.1 - 1.3 x10(3)/mcL    Eos # 0.17 0 - 0.9 x10(3)/mcL    Baso # 0.04 <=0.2 x10(3)/mcL    Imm Gran # 0.02 0 - 0.04 x10(3)/mcL    Imm Grans % 0.3 %    NRBC% 0.0 %          Assessment & Plan:     Active Problem List with Overview Notes    Diagnosis Date Noted    Advanced care planning/counseling discussion 10/05/2023    Colon cancer screening 10/05/2023    Wellness examination 10/05/2022    Erectile dysfunction following radical prostatectomy 10/19/2020    Hypertension associated with diabetes 03/18/2020    Type 2 diabetes mellitus 03/18/2020    Hyperlipidemia associated with type 2 diabetes mellitus 03/18/2020    Hearing loss 03/18/2020    Snoring 03/18/2020    Sinus bradycardia 03/18/2020    Total bilirubin, elevated 03/18/2020    Prostate cancer 02/11/2020       1. Type 2 diabetes mellitus without complication, without long-term current use of insulin  Assessment & Plan:  Lab Results   Component Value Date    HGBA1C 7.0 11/26/2024     Urine micro 11/2024  Foot exam 10/2024  Eye exam with dr. [facility] 10/2024    On metformin 500 mg daily and statin and acei.  on mounjaro 10mg. tolerating well.     Will plan on labs in 1 month. Will call with results when available.  If A1c <7.0 or better, can stop metformin.      Contact clinic for concerns. Patient is agreeable to plan and verbalized understanding    Orders:  -     Comprehensive Metabolic Panel; Future; Expected date: 04/08/2025  -     Hemoglobin A1C; Future; Expected date: 04/08/2025  -     CBC Auto Differential; Future; Expected date: 10/08/2025  -     Comprehensive Metabolic Panel; Future; Expected date: 10/08/2025  -     Lipid Panel; Future; Expected date: 10/08/2025  -     TSH; Future; Expected date: 10/08/2025  -     Hemoglobin A1C; Future; Expected date: 10/08/2025  -      "Urinalysis; Future; Expected date: 10/08/2025  -     Microalbumin/Creatinine Ratio, Urine; Future; Expected date: 10/08/2025  -     metFORMIN (GLUCOPHAGE) 500 MG tablet; Take 1 tablet (500 mg total) by mouth daily with breakfast.    2. Hypertension associated with diabetes  Assessment & Plan:  Well controlled on current prescription. On asa    Orders:  -     Comprehensive Metabolic Panel; Future; Expected date: 04/08/2025  -     Hemoglobin A1C; Future; Expected date: 04/08/2025  -     CBC Auto Differential; Future; Expected date: 10/08/2025  -     Comprehensive Metabolic Panel; Future; Expected date: 10/08/2025  -     Lipid Panel; Future; Expected date: 10/08/2025  -     TSH; Future; Expected date: 10/08/2025  -     Hemoglobin A1C; Future; Expected date: 10/08/2025  -     Urinalysis; Future; Expected date: 10/08/2025  -     Microalbumin/Creatinine Ratio, Urine; Future; Expected date: 10/08/2025    3. Hyperlipidemia associated with type 2 diabetes mellitus  Assessment & Plan:  On statin and fish oil    Lab Results   Component Value Date    CHOL 182 11/26/2024    CHOL 184 10/06/2023    CHOL 154 10/05/2022     Lab Results   Component Value Date    HDL 49 11/26/2024    HDL 44 10/06/2023    HDL 37 10/05/2022     No results found for: "LDLCALC"  Lab Results   Component Value Date    TRIG 93 11/26/2024    TRIG 134 10/06/2023    TRIG 95 10/05/2022       No results found for: "CHOLHDL"      Orders:  -     Comprehensive Metabolic Panel; Future; Expected date: 04/08/2025  -     Hemoglobin A1C; Future; Expected date: 04/08/2025  -     CBC Auto Differential; Future; Expected date: 10/08/2025  -     Comprehensive Metabolic Panel; Future; Expected date: 10/08/2025  -     Lipid Panel; Future; Expected date: 10/08/2025  -     TSH; Future; Expected date: 10/08/2025  -     Hemoglobin A1C; Future; Expected date: 10/08/2025  -     Urinalysis; Future; Expected date: 10/08/2025  -     Microalbumin/Creatinine Ratio, Urine; Future; Expected " date: 10/08/2025    4. Wellness examination  -     Comprehensive Metabolic Panel; Future; Expected date: 04/08/2025  -     Hemoglobin A1C; Future; Expected date: 04/08/2025  -     CBC Auto Differential; Future; Expected date: 10/08/2025  -     Comprehensive Metabolic Panel; Future; Expected date: 10/08/2025  -     Lipid Panel; Future; Expected date: 10/08/2025  -     TSH; Future; Expected date: 10/08/2025  -     Hemoglobin A1C; Future; Expected date: 10/08/2025  -     Urinalysis; Future; Expected date: 10/08/2025  -     Microalbumin/Creatinine Ratio, Urine; Future; Expected date: 10/08/2025         Follow up in about 6 months (around 10/8/2025) for Medicare Wellness with labs.          [1]   Current Outpatient Medications on File Prior to Visit   Medication Sig Dispense Refill    aspirin (ECOTRIN) 81 MG EC tablet Take 81 mg by mouth once daily.      atorvastatin (LIPITOR) 20 MG tablet Take 1 tablet (20 mg total) by mouth once daily. 90 tablet 0    carvediloL (COREG) 6.25 MG tablet TAKE 1 TABLET BY MOUTH TWICE A  tablet 3    fluticasone propionate (FLONASE) 50 mcg/actuation nasal spray 1 spray by Nasal route daily as needed.      lisinopriL (PRINIVIL,ZESTRIL) 20 MG tablet TAKE 1 TABLET BY MOUTH EVERY DAY 90 tablet 3    multivitamin (THERAGRAN) per tablet Take 1 tablet by mouth every evening.      omega-3 fatty acids/fish oil (FISH OIL-OMEGA-3 FATTY ACIDS) 300-1,000 mg capsule Take 1 capsule by mouth every morning.      OMEGA-3-DHA-EPA-FISH OIL ORAL Take 1,000 mg by mouth.      pantoprazole (PROTONIX) 40 MG tablet Take 40 mg by mouth every morning.      tadalafiL (CIALIS) 20 MG Tab Take 20 mg by mouth once daily.      tirzepatide (MOUNJARO) 10 mg/0.5 mL PnIj Inject 10 mg into the skin every 7 days. 2 mL 0    ubidecarenone (COQ-10 ORAL) capsule      [DISCONTINUED] metFORMIN (GLUCOPHAGE) 500 MG tablet TAKE 1 TABLET BY MOUTH TWICE A DAY WITH FOOD 180 tablet 3     No current facility-administered medications on  file prior to visit.

## 2025-04-08 NOTE — TELEPHONE ENCOUNTER
Confirm that he completes 4 shots/1 month of 10mg dose before increasing to 12.5mg dose. Rx sent in. Continue on metformin 500mg once daily. Watch diet/exercise. Repeat lab orders in. Contact clinic for concerns.       I have signed for the following orders AND/OR meds.  Please call the patient and ask the patient to schedule the testing AND/OR inform about any medications that were sent.        Medications Ordered This Encounter   Medications    tirzepatide (MOUNJARO) 12.5 mg/0.5 mL PnIj     Sig: Inject 12.5 mg into the skin every 7 days.     Dispense:  2 mL     Refill:  2

## 2025-04-08 NOTE — TELEPHONE ENCOUNTER
I have signed for the following orders AND/OR meds.  Please call the patient and ask the patient to schedule the testing AND/OR inform about any medications that were sent.      Orders Placed This Encounter   Procedures    Comprehensive Metabolic Panel     Standing Status:   Future     Expected Date:   4/8/2025     Expiration Date:   7/8/2025    Hemoglobin A1C     Standing Status:   Future     Expected Date:   4/8/2025     Expiration Date:   7/8/2025

## 2025-04-08 NOTE — ASSESSMENT & PLAN NOTE
Lab Results   Component Value Date    HGBA1C 7.0 11/26/2024     Urine micro 11/2024  Foot exam 10/2024  Eye exam with dr. [facility] 10/2024    On metformin 500 mg daily and statin and acei.  on mounjaro 10mg. tolerating well.     Will plan on labs in 1 month. Will call with results when available.  If A1c <7.0 or better, can stop metformin.      Contact clinic for concerns. Patient is agreeable to plan and verbalized understanding

## 2025-05-05 RX ORDER — ATORVASTATIN CALCIUM 20 MG/1
20 TABLET, FILM COATED ORAL
Qty: 90 TABLET | Refills: 0 | Status: SHIPPED | OUTPATIENT
Start: 2025-05-05

## 2025-07-01 DIAGNOSIS — E11.59 HYPERTENSION ASSOCIATED WITH DIABETES: ICD-10-CM

## 2025-07-01 DIAGNOSIS — I15.2 HYPERTENSION ASSOCIATED WITH DIABETES: ICD-10-CM

## 2025-07-01 RX ORDER — CARVEDILOL 6.25 MG/1
6.25 TABLET ORAL 2 TIMES DAILY
Qty: 180 TABLET | Refills: 3 | Status: SHIPPED | OUTPATIENT
Start: 2025-07-01

## 2025-07-23 ENCOUNTER — PATIENT MESSAGE (OUTPATIENT)
Dept: FAMILY MEDICINE | Facility: CLINIC | Age: 70
End: 2025-07-23
Payer: MEDICARE

## 2025-07-23 DIAGNOSIS — E11.9 TYPE 2 DIABETES MELLITUS WITHOUT COMPLICATION, WITHOUT LONG-TERM CURRENT USE OF INSULIN: Primary | ICD-10-CM

## 2025-07-23 RX ORDER — TIRZEPATIDE 12.5 MG/.5ML
12.5 INJECTION, SOLUTION SUBCUTANEOUS
COMMUNITY
End: 2025-07-23 | Stop reason: SDUPTHER

## 2025-07-23 RX ORDER — TIRZEPATIDE 12.5 MG/.5ML
12.5 INJECTION, SOLUTION SUBCUTANEOUS
Qty: 2 ML | Refills: 3 | Status: SHIPPED | OUTPATIENT
Start: 2025-07-23

## (undated) DEVICE — SYR 50ML CATH TIP

## (undated) DEVICE — NDL INSUF ULTRA VERESS 120MM

## (undated) DEVICE — PORT AIRSEAL 12/120MM LPI

## (undated) DEVICE — SUT MONOCRYL 3-0 RB1

## (undated) DEVICE — COVER LIGHT HANDLE

## (undated) DEVICE — COVER TIP CURVED SCISSORS XI

## (undated) DEVICE — SET TRI-LUMEN FILTERED TUBE

## (undated) DEVICE — SOL WATER STRL IRR 1000ML

## (undated) DEVICE — SOL NS 1000CC

## (undated) DEVICE — DRAPE ABDOMINAL TIBURON 14X11

## (undated) DEVICE — DRAPE SCOPE PILLOW WARMER

## (undated) DEVICE — SUT ABS CLIP LAPRA-TY CTD

## (undated) DEVICE — LUBRICANT SURGILUBE 2 OZ

## (undated) DEVICE — TRAY MINOR GEN SURG

## (undated) DEVICE — SUT 2/0 36IN COATED VICRYL

## (undated) DEVICE — SUT PDS II 0 CT-1 VIL MONO

## (undated) DEVICE — DRAPE COLUMN DAVINCI XI

## (undated) DEVICE — ELECTRODE REM PLYHSV RETURN 9

## (undated) DEVICE — Device

## (undated) DEVICE — TROCAR ENDOPATH XCEL 5MM 7.5CM

## (undated) DEVICE — SUT MCRYL PLUS 4-0 PS2 27IN

## (undated) DEVICE — SOL ELECTROLUBE ANTI-STIC

## (undated) DEVICE — CLIP HEMO-LOK MLX LARGE LF

## (undated) DEVICE — SCISSOR 5MMX35CM DIRECT DRIVE

## (undated) DEVICE — GOWN SURGICAL X-LARGE

## (undated) DEVICE — BAG TISS RETRV MONARCH 10MM

## (undated) DEVICE — LEGGINGS 48X31 INCH

## (undated) DEVICE — DRAIN CHANNEL ROUND 10FR

## (undated) DEVICE — SEAL UNIVERSAL 5MM-8MM XI

## (undated) DEVICE — DRAPE ARM DAVINCI XI

## (undated) DEVICE — EVACUATOR WOUND BULB 100CC

## (undated) DEVICE — IRRIGATOR ENDOSCOPY DISP.

## (undated) DEVICE — TRAY FOLEY 16FR INFECTION CONT

## (undated) DEVICE — ADHESIVE DERMABOND ADVANCED

## (undated) DEVICE — SUT MONOCRYL 3-0 UNDYED RB1